# Patient Record
Sex: FEMALE | Race: WHITE | NOT HISPANIC OR LATINO | Employment: OTHER | URBAN - METROPOLITAN AREA
[De-identification: names, ages, dates, MRNs, and addresses within clinical notes are randomized per-mention and may not be internally consistent; named-entity substitution may affect disease eponyms.]

---

## 2017-01-17 ENCOUNTER — ALLSCRIPTS OFFICE VISIT (OUTPATIENT)
Dept: OTHER | Facility: OTHER | Age: 77
End: 2017-01-17

## 2017-02-02 ENCOUNTER — ALLSCRIPTS OFFICE VISIT (OUTPATIENT)
Dept: OTHER | Facility: OTHER | Age: 77
End: 2017-02-02

## 2017-02-06 ENCOUNTER — GENERIC CONVERSION - ENCOUNTER (OUTPATIENT)
Dept: OTHER | Facility: OTHER | Age: 77
End: 2017-02-06

## 2017-02-20 ENCOUNTER — APPOINTMENT (OUTPATIENT)
Dept: LAB | Facility: CLINIC | Age: 77
End: 2017-02-20
Payer: MEDICARE

## 2017-02-20 ENCOUNTER — TRANSCRIBE ORDERS (OUTPATIENT)
Dept: ADMINISTRATIVE | Facility: HOSPITAL | Age: 77
End: 2017-02-20

## 2017-02-20 DIAGNOSIS — I10 UNSPECIFIED ESSENTIAL HYPERTENSION: ICD-10-CM

## 2017-02-20 DIAGNOSIS — E78.00 PURE HYPERCHOLESTEROLEMIA: ICD-10-CM

## 2017-02-20 DIAGNOSIS — I42.8 OTHER PRIMARY CARDIOMYOPATHIES: ICD-10-CM

## 2017-02-20 DIAGNOSIS — I42.8 OTHER PRIMARY CARDIOMYOPATHIES: Primary | ICD-10-CM

## 2017-02-20 LAB
ALBUMIN SERPL BCP-MCNC: 3.8 G/DL (ref 3.5–5)
ALP SERPL-CCNC: 75 U/L (ref 46–116)
ALT SERPL W P-5'-P-CCNC: 30 U/L (ref 12–78)
ANION GAP SERPL CALCULATED.3IONS-SCNC: 7 MMOL/L (ref 4–13)
AST SERPL W P-5'-P-CCNC: 25 U/L (ref 5–45)
BILIRUB DIRECT SERPL-MCNC: 0.11 MG/DL (ref 0–0.2)
BILIRUB SERPL-MCNC: 0.46 MG/DL (ref 0.2–1)
BUN SERPL-MCNC: 28 MG/DL (ref 5–25)
CALCIUM SERPL-MCNC: 9.3 MG/DL (ref 8.3–10.1)
CHLORIDE SERPL-SCNC: 103 MMOL/L (ref 100–108)
CHOLEST SERPL-MCNC: 199 MG/DL (ref 50–200)
CO2 SERPL-SCNC: 30 MMOL/L (ref 21–32)
CREAT SERPL-MCNC: 1.18 MG/DL (ref 0.6–1.3)
GFR SERPL CREATININE-BSD FRML MDRD: 44.5 ML/MIN/1.73SQ M
GLUCOSE SERPL-MCNC: 90 MG/DL (ref 65–140)
HDLC SERPL-MCNC: 53 MG/DL (ref 40–60)
LDLC SERPL CALC-MCNC: 119 MG/DL (ref 0–100)
POTASSIUM SERPL-SCNC: 4.6 MMOL/L (ref 3.5–5.3)
PROT SERPL-MCNC: 8.2 G/DL (ref 6.4–8.2)
SODIUM SERPL-SCNC: 140 MMOL/L (ref 136–145)
TRIGL SERPL-MCNC: 133 MG/DL
TSH SERPL DL<=0.05 MIU/L-ACNC: 1.53 UIU/ML (ref 0.36–3.74)

## 2017-02-20 PROCEDURE — 84443 ASSAY THYROID STIM HORMONE: CPT

## 2017-02-20 PROCEDURE — 80061 LIPID PANEL: CPT

## 2017-02-20 PROCEDURE — 80076 HEPATIC FUNCTION PANEL: CPT

## 2017-02-20 PROCEDURE — 36415 COLL VENOUS BLD VENIPUNCTURE: CPT

## 2017-02-20 PROCEDURE — 80048 BASIC METABOLIC PNL TOTAL CA: CPT

## 2017-03-31 ENCOUNTER — GENERIC CONVERSION - ENCOUNTER (OUTPATIENT)
Dept: OTHER | Facility: OTHER | Age: 77
End: 2017-03-31

## 2017-06-06 ENCOUNTER — ALLSCRIPTS OFFICE VISIT (OUTPATIENT)
Dept: OTHER | Facility: OTHER | Age: 77
End: 2017-06-06

## 2017-06-14 ENCOUNTER — GENERIC CONVERSION - ENCOUNTER (OUTPATIENT)
Dept: OTHER | Facility: OTHER | Age: 77
End: 2017-06-14

## 2017-06-14 ENCOUNTER — ALLSCRIPTS OFFICE VISIT (OUTPATIENT)
Dept: OTHER | Facility: OTHER | Age: 77
End: 2017-06-14

## 2017-07-11 ENCOUNTER — GENERIC CONVERSION - ENCOUNTER (OUTPATIENT)
Dept: OTHER | Facility: OTHER | Age: 77
End: 2017-07-11

## 2017-09-25 ENCOUNTER — GENERIC CONVERSION - ENCOUNTER (OUTPATIENT)
Dept: OTHER | Facility: OTHER | Age: 77
End: 2017-09-25

## 2017-10-02 ENCOUNTER — OFFICE VISIT (OUTPATIENT)
Dept: URGENT CARE | Facility: CLINIC | Age: 77
End: 2017-10-02
Payer: MEDICARE

## 2017-10-02 PROCEDURE — 99213 OFFICE O/P EST LOW 20 MIN: CPT

## 2017-10-02 PROCEDURE — G0463 HOSPITAL OUTPT CLINIC VISIT: HCPCS

## 2017-10-11 NOTE — PROGRESS NOTES
Assessment  1  Seasonal allergies (477 9) (J30 2)   2  Cough (786 2) (R05)    Plan  Cough    · Mucinex 600 MG Oral Tablet Extended Release 12 Hour; TAKE 1 TABLET EVERY 12  HOURS AS NEEDED FOR CONGESTION   · ZyrTEC Allergy 10 MG Oral Capsule; I TAB DAILY    Discussion/Summary  Discussion Summary:   Take zyrtec and mucinex as directed  increase fluids and use tylenol or ibuprofen for fever or pain  Followup with primary or return for any new or worsening symptoms  Medication Side Effects Reviewed: Possible side effects of new medications were reviewed with the patient/guardian today  Counseling Documentation With Imm: The patient was counseled regarding diagnostic results,-instructions for management,-risk factor reductions,-prognosis,-patient and family education,-impressions,-risks and benefits of treatment options,-importance of compliance with treatment  total time of encounter was 15 [id number] minutes was spent counseling  Follow Up Instructions: Follow Up with your Primary Care Provider in 7 days  If your symptoms worsen, go to the nearest [place] [address] Emergency Department  Chief Complaint  1  Cold Symptoms  Chief Complaint Free Text Note Form: The patient reports a runny nose, post nasal drip, watery eyes, productive cough and chest congestion for the past five days  She denies any fevers or pain  History of Present Illness  HPI: pt reports cold symptoms fo rthe past 5 days  reports sputu with occ dry cough  sore throat runny nose denies tob denies seasonal allergires  ayan fever, n/v/d , ear pain  Hospital Based Practices Required Assessment:   Pain Assessment   the patient states they do not have pain  Abuse And Domestic Violence Screen    Yes, the patient is safe at home -The patient states no one is hurting them  Depression And Suicide Screen  No, the patient has not had thoughts of hurting themself  No, the patient has not felt depressed in the past 7 days     Prefered Language is  [place]  Primary Language is  English  Readiness To Learn: Receptive  Barriers To Learning: none  Education Completed: disease/condition,-medications,-further treatment/follow-up,-treatment/procedure,-equipment/supplies-and-   Teaching Method: verbal-and-written   Person Taught: patient   Cold Symptoms: [place] presents with complaints of cold symptoms  Associated symptoms include nasal congestion,-runny nose,-post nasal drainage,-scratchy throat,-sore throat,-dry cough,-productive cough,-fatigue-and-weakness, but-no sneezing,-no facial pressure,-no facial pain,-no headache,-no plugged ear(s),-no ear pain,-no swollen lymph nodes,-no wheezing,-no shortness of breath,-no nausea,-no vomiting,-no diarrhea,-no fever-and-no chills  Active Problems  1  Allergic rhinitis (477 9) (J30 9)   2  Bilateral impacted cerumen (380 4) (H61 23)   3  Cardiomyopathy (425 4) (I42 9)   4  CHF (congestive heart failure) (428 0) (I50 9)   5  Contact dermatitis due to poison ivy (692 6) (L23 7)   6  GERD without esophagitis (530 81) (K21 9)   7  Hypertension (401 9) (I10)   8  ICD (implantable cardioverter-defibrillator) in place (V45 02) (Z95 810)   9  Joint pain, knee (719 46) (M25 569)   10  Osteoporosis (733 00) (M81 0)   11  Postprandial abdominal bloating (787 3) (R14 0)   12  TMJ syndrome (524 69) (M26 629)    Past Medical History  1  Acute bronchitis (466 0) (J20 9)   2  History of Chronic diarrhea of unknown origin (787 91) (K52 9)   3  History of Diabetes mellitus screening (V77 1) (Z13 1)   4  History of Encounter for screening for cardiovascular disorders (V81 2) (Z13 6)   5  History of Encounter for screening mammogram for malignant neoplasm of breast   (V76 12) (Z12 31)   6  History of Flu vaccine need (V04 81) (Z23)   7  History of contact dermatitis (V13 3) (Z87 2)   8  History of esophageal reflux (V12 79) (Z87 19)   9  History of hyperlipidemia (V12 29) (Z86 39)   10   History of impacted cerumen (V12 49) (Z86 69)   11  History of reactive airway disease (V12 69) (Z87 09)   12  History of urticaria (V13 3) (Z87 2)   13  History of weakness (V13 89) (Z87 898)   14  History of Impacted cerumen, unspecified laterality   15  History of Influenza vaccine needed (V04 81) (Z23)   16  History of Multiple Nonvenomous Insect Bites (919 4)   17  History of Muscle weakness (generalized) (728 87) (M62 81)  Active Problems And Past Medical History Reviewed: The active problems and past medical history were reviewed and updated today  Family History  Father    1  Family history of Heart disease   2  No family history of mental disorder  Daughter    3  Family history of Diabetes   4  Family history of Mental disorder  Family History Reviewed: The family history was reviewed and updated today  Social History   · Denies alcohol use causing problems   · Marital History -    · Never A Smoker   · Occupation: Retired  Social History Reviewed: The social history was reviewed and updated today  The social history was reviewed and is unchanged  Surgical History  1  History of Open Treatment Of Weight Bearing Distal Tibial Fracture  Surgical History Reviewed: The surgical history was reviewed and updated today  Current Meds   1  Amiodarone HCl - 200 MG Oral Tablet; TAKE 1 TABLET DAILY; Therapy: (Recorded:[id number]) to Recorded   2  Aspirin 81 MG TABS; Take 1 tablet daily Recorded   3  [employer] Vitamin D CAPS; TAKE 1 CAPSULE Daily Recorded   4  Daily Value Multivitamin TABS; Take 1 tablet daily Recorded   5  Furosemide 40 MG Oral Tablet; Take 1 tablet daily Recorded   6  Losartan Potassium 25 MG Oral Tablet; Take 1 tablet daily Recorded   7  Metoprolol Succinate ER 50 MG Oral Tablet Extended Release 24 Hour; Take 1 tablet   daily Recorded  Medication List Reviewed: The medication list was reviewed and updated today  Allergies  1   No Known Drug Allergies    Vitals  Signs   Recorded: [id number] 11:06AM   Temperature: 97 7 F  Heart Rate: 60  Respiration: 14  Systolic: 739  Diastolic: 72  Height: 5 ft 5 in  Weight: 128 lb   BMI Calculated: 21 3  BSA Calculated: 1 64  O2 Saturation: 98  Pain Scale: 0    Physical Exam    Constitutional   General appearance: No acute distress, well appearing and well nourished  Eyes   Conjunctiva and lids: No swelling, erythema or discharge  Ears, Nose, Mouth, and Throat   External inspection of ears and nose: Normal     Otoscopic examination: Tympanic membranes translucent with normal light reflex  Canals patent without erythema  Nasal mucosa, septum, and turbinates: Normal without edema or erythema  Oropharynx: Normal with no erythema, edema, exudate or lesions  Pulmonary   Respiratory effort: No increased work of breathing or signs of respiratory distress  Auscultation of lungs: Clear to auscultation  Cardiovascular   Auscultation of heart: Normal rate and rhythm, normal S1 and S2, without murmurs  Lymphatic   Palpation of lymph nodes in neck: No lymphadenopathy  Musculoskeletal   Digits and nails: Normal without clubbing or cyanosis  Skin   Skin and subcutaneous tissue: Normal without rashes or lesions      Psychiatric   Orientation to person, place, and time: Normal        Signatures   Electronically signed by : Jada Sharif; Oct  2 2017 12:54PM EST                       (Author)    Electronically signed by : Edel Callejas DO; Oct 10 2017  7:10PM EST                       (Co-author)

## 2017-10-17 ENCOUNTER — APPOINTMENT (OUTPATIENT)
Dept: LAB | Facility: CLINIC | Age: 77
End: 2017-10-17
Payer: MEDICARE

## 2017-10-17 ENCOUNTER — TRANSCRIBE ORDERS (OUTPATIENT)
Dept: ADMINISTRATIVE | Facility: HOSPITAL | Age: 77
End: 2017-10-17

## 2017-10-17 DIAGNOSIS — I50.20 SYSTOLIC HEART FAILURE, UNSPECIFIED HEART FAILURE CHRONICITY: Primary | ICD-10-CM

## 2017-10-17 DIAGNOSIS — I50.20 SYSTOLIC HEART FAILURE, UNSPECIFIED HEART FAILURE CHRONICITY: ICD-10-CM

## 2017-10-17 DIAGNOSIS — I42.0 CONGESTIVE CARDIOMYOPATHY (HCC): ICD-10-CM

## 2017-10-17 LAB
ALBUMIN SERPL BCP-MCNC: 3.8 G/DL (ref 3.5–5)
ALP SERPL-CCNC: 67 U/L (ref 46–116)
ALT SERPL W P-5'-P-CCNC: 30 U/L (ref 12–78)
ANION GAP SERPL CALCULATED.3IONS-SCNC: 5 MMOL/L (ref 4–13)
AST SERPL W P-5'-P-CCNC: 30 U/L (ref 5–45)
BILIRUB DIRECT SERPL-MCNC: 0.1 MG/DL (ref 0–0.2)
BILIRUB SERPL-MCNC: 0.31 MG/DL (ref 0.2–1)
BUN SERPL-MCNC: 18 MG/DL (ref 5–25)
CALCIUM SERPL-MCNC: 8.7 MG/DL (ref 8.3–10.1)
CHLORIDE SERPL-SCNC: 106 MMOL/L (ref 100–108)
CHOLEST SERPL-MCNC: 164 MG/DL (ref 50–200)
CK MB SERPL-MCNC: 2.2 % (ref 0–2.5)
CK MB SERPL-MCNC: 8.8 NG/ML (ref 0–5)
CK SERPL-CCNC: 396 U/L (ref 26–192)
CO2 SERPL-SCNC: 30 MMOL/L (ref 21–32)
CREAT SERPL-MCNC: 1.02 MG/DL (ref 0.6–1.3)
GFR SERPL CREATININE-BSD FRML MDRD: 53 ML/MIN/1.73SQ M
GLUCOSE P FAST SERPL-MCNC: 93 MG/DL (ref 65–99)
HDLC SERPL-MCNC: 50 MG/DL (ref 40–60)
LDLC SERPL CALC-MCNC: 87 MG/DL (ref 0–100)
POTASSIUM SERPL-SCNC: 3.6 MMOL/L (ref 3.5–5.3)
PROT SERPL-MCNC: 7.6 G/DL (ref 6.4–8.2)
SODIUM SERPL-SCNC: 141 MMOL/L (ref 136–145)
TRIGL SERPL-MCNC: 134 MG/DL
TSH SERPL DL<=0.05 MIU/L-ACNC: 1.32 UIU/ML (ref 0.36–3.74)

## 2017-10-17 PROCEDURE — 80061 LIPID PANEL: CPT

## 2017-10-17 PROCEDURE — 82553 CREATINE MB FRACTION: CPT

## 2017-10-17 PROCEDURE — 84443 ASSAY THYROID STIM HORMONE: CPT

## 2017-10-17 PROCEDURE — 82550 ASSAY OF CK (CPK): CPT

## 2017-10-17 PROCEDURE — 36415 COLL VENOUS BLD VENIPUNCTURE: CPT

## 2017-10-17 PROCEDURE — 82248 BILIRUBIN DIRECT: CPT

## 2017-10-17 PROCEDURE — 80053 COMPREHEN METABOLIC PANEL: CPT

## 2017-10-18 ENCOUNTER — APPOINTMENT (OUTPATIENT)
Dept: LAB | Facility: CLINIC | Age: 77
End: 2017-10-18
Payer: MEDICARE

## 2017-10-18 ENCOUNTER — TRANSCRIBE ORDERS (OUTPATIENT)
Dept: ADMINISTRATIVE | Facility: HOSPITAL | Age: 77
End: 2017-10-18

## 2017-10-18 DIAGNOSIS — R79.89 HYPOURICEMIA: ICD-10-CM

## 2017-10-18 DIAGNOSIS — R79.89 HYPOURICEMIA: Primary | ICD-10-CM

## 2017-10-18 LAB — TROPONIN I SERPL-MCNC: <0.02 NG/ML

## 2017-10-18 PROCEDURE — 36415 COLL VENOUS BLD VENIPUNCTURE: CPT

## 2017-10-18 PROCEDURE — 84484 ASSAY OF TROPONIN QUANT: CPT

## 2017-12-15 ENCOUNTER — GENERIC CONVERSION - ENCOUNTER (OUTPATIENT)
Dept: OTHER | Facility: OTHER | Age: 77
End: 2017-12-15

## 2017-12-15 ENCOUNTER — GENERIC CONVERSION - ENCOUNTER (OUTPATIENT)
Dept: FAMILY MEDICINE CLINIC | Facility: CLINIC | Age: 77
End: 2017-12-15

## 2018-01-12 VITALS
BODY MASS INDEX: 20.25 KG/M2 | SYSTOLIC BLOOD PRESSURE: 128 MMHG | HEIGHT: 66 IN | HEART RATE: 60 BPM | DIASTOLIC BLOOD PRESSURE: 82 MMHG | WEIGHT: 126 LBS | TEMPERATURE: 97.8 F

## 2018-01-13 NOTE — PROCEDURES
Chief Complaint  Ear Lavage      Current Meds   1  Amiodarone HCl - 200 MG Oral Tablet; TAKE 1 TABLET DAILY; Therapy: (Recorded:20Voy5254) to Recorded   2  Aspirin 81 MG TABS; Take 1 tablet daily Recorded   3  [employer] Vitamin D CAPS; TAKE 1 CAPSULE Daily Recorded   4  Daily Value Multivitamin TABS; Take 1 tablet daily Recorded   5  Furosemide 40 MG Oral Tablet; Take 1 tablet daily Recorded   6  Losartan Potassium 25 MG Oral Tablet; Take 1 tablet daily Recorded   7  Metoprolol Succinate ER 50 MG Oral Tablet Extended Release 24 Hour; Take 1 tablet   daily Recorded    Allergies    1  No Known Drug Allergies    Vitals  Signs    Heart Rate: 60  Systolic: 086, LUE, Sitting  Diastolic: 60, LUE, Sitting  Height: 5 ft 5 5 in  Weight: 126 lb   BMI Calculated: 20 65  BSA Calculated: 1 63    Procedure    Procedure: cerumen removal    Indication: in both ears  Procedure Note: The procedure was performed by the Provider and lasted 15 minute(s)  A otoscope was placed in the ear canal(s) to visualize the ear canal debris  The ear was cleaned by using warm water irrigation and a curette  The procedure was partially successful  Post-Procedure:   Patient Status: the patient tolerated the procedure well  Complications: there were no complications  Patient instructions: avoid using q-tips  Follow-up as needed  Assessment    1  Bilateral impacted cerumen (380 4) (G53 10)    Discussion/Summary    49-year-old female here for ear lavage and removal of cerumen impaction  Upon completion of procedure tympanic membranes were visualized and hearing noticed to be improved        Signatures   Electronically signed by : ALVARO Amaya ; Feb 2 2017 12:01PM EST                       (Author)

## 2018-01-14 VITALS
WEIGHT: 130 LBS | HEART RATE: 68 BPM | SYSTOLIC BLOOD PRESSURE: 116 MMHG | BODY MASS INDEX: 20.89 KG/M2 | HEIGHT: 66 IN | DIASTOLIC BLOOD PRESSURE: 68 MMHG

## 2018-01-14 VITALS
WEIGHT: 126 LBS | HEART RATE: 60 BPM | DIASTOLIC BLOOD PRESSURE: 60 MMHG | BODY MASS INDEX: 20.25 KG/M2 | SYSTOLIC BLOOD PRESSURE: 100 MMHG | HEIGHT: 66 IN

## 2018-01-14 VITALS
DIASTOLIC BLOOD PRESSURE: 76 MMHG | WEIGHT: 127 LBS | HEIGHT: 66 IN | SYSTOLIC BLOOD PRESSURE: 130 MMHG | HEART RATE: 60 BPM | BODY MASS INDEX: 20.41 KG/M2

## 2018-01-15 NOTE — MISCELLANEOUS
Message   Recorded as Task   Date: 06/14/2017 09:09 AM, Created By: Meaghan Prasad   Task Name: Medical Complaint Callback   Assigned To: Calixto Yeboah   Regarding Patient: Jenise Torres, Status: In Progress   KristiToby Perdomo - 14 Jun 2017 9:09 AM     TASK CREATED  Caller: Self; Medical Complaint; [phone] (Home)  PT C/O POISON IVY ON HAND AND ARMS  CAN YOU RX SOMETHING?  CAN BE REACHED [address] [facility] [phone]   Jasbir Patel - 14 Jun 2017 9:32 AM     TASK REASSIGNED: Previously Assigned To Jasbir Patel  Tell vera to come in this afternoon or this evening   YolisApril - 14 Jun 2017 10:10 AM     TASK EDITED  left detailed messages on cell phone and home number (numbers provided)   YolisApril - 14 Jun 2017 10:10 AM     TASK IN PROGRESS   YolisApril - 14 Jun 2017 10:28 AM     TASK EDITED  patient called back, Juan A Hdez scheduled patient for 1:45pm per ID[facility] program         Active Problems    1  Allergic rhinitis (477 9) (J30 9)   2  Bilateral impacted cerumen (380 4) (H61 23)   3  Cardiomyopathy (425 4) (I42 9)   4  CHF (congestive heart failure) (428 0) (I50 9)   5  GERD without esophagitis (530 81) (K21 9)   6  Hypertension (401 9) (I10)   7  ICD (implantable cardioverter-defibrillator) in place (V45 02) (Z95 810)   8  Joint pain, knee (719 46) (M25 569)   9  Osteoporosis (733 00) (M81 0)   10  Postprandial abdominal bloating (787 3) (R14 0)   11  TMJ syndrome (524 69) (M26 629)    Current Meds   1  Amiodarone HCl - 200 MG Oral Tablet; TAKE 1 TABLET DAILY; Therapy: (Recorded:17Jan2017) to Recorded   2  Aspirin 81 MG TABS; Take 1 tablet daily Recorded   3  CVS Vitamin D CAPS; TAKE 1 CAPSULE Daily Recorded   4  Daily Value Multivitamin TABS; Take 1 tablet daily Recorded   5  Furosemide 40 MG Oral Tablet; Take 1 tablet daily Recorded   6  Losartan Potassium 25 MG Oral Tablet; Take 1 tablet daily Recorded   7  Metoprolol Succinate ER 50 MG Oral Tablet Extended Release 24 Hour;  Take 1 tablet daily Recorded    Allergies    1   No Known Drug Allergies    Signatures   Electronically signed by : Cecil Chavarria, ; Jun 14 2017 10:28AM EST                       (Author)

## 2018-02-03 ENCOUNTER — TRANSCRIBE ORDERS (OUTPATIENT)
Dept: ADMINISTRATIVE | Facility: HOSPITAL | Age: 78
End: 2018-02-03

## 2018-02-03 ENCOUNTER — APPOINTMENT (OUTPATIENT)
Dept: LAB | Facility: CLINIC | Age: 78
End: 2018-02-03
Payer: MEDICARE

## 2018-02-03 DIAGNOSIS — I42.9 PRIMARY CARDIOMYOPATHY (HCC): ICD-10-CM

## 2018-02-03 DIAGNOSIS — I10 ESSENTIAL HYPERTENSION, MALIGNANT: Primary | ICD-10-CM

## 2018-02-03 DIAGNOSIS — I42.9 PRIMARY CARDIOMYOPATHY (HCC): Primary | ICD-10-CM

## 2018-02-03 LAB
CK MB SERPL-MCNC: 2.7 % (ref 0–2.5)
CK MB SERPL-MCNC: 8.8 NG/ML (ref 0–5)
CK SERPL-CCNC: 329 U/L (ref 26–192)

## 2018-02-03 PROCEDURE — 82553 CREATINE MB FRACTION: CPT

## 2018-02-03 PROCEDURE — 82550 ASSAY OF CK (CPK): CPT

## 2018-02-03 PROCEDURE — 36415 COLL VENOUS BLD VENIPUNCTURE: CPT

## 2018-04-13 ENCOUNTER — APPOINTMENT (OUTPATIENT)
Dept: RADIOLOGY | Facility: CLINIC | Age: 78
End: 2018-04-13
Payer: MEDICARE

## 2018-04-13 ENCOUNTER — TRANSCRIBE ORDERS (OUTPATIENT)
Dept: ADMINISTRATIVE | Facility: HOSPITAL | Age: 78
End: 2018-04-13

## 2018-04-13 ENCOUNTER — APPOINTMENT (OUTPATIENT)
Dept: LAB | Facility: CLINIC | Age: 78
End: 2018-04-13
Payer: MEDICARE

## 2018-04-13 DIAGNOSIS — I10 HYPERTENSION, ESSENTIAL: ICD-10-CM

## 2018-04-13 DIAGNOSIS — J84.10 POSTINFLAMMATORY PULMONARY FIBROSIS (HCC): ICD-10-CM

## 2018-04-13 DIAGNOSIS — I42.9 PRIMARY CARDIOMYOPATHY (HCC): ICD-10-CM

## 2018-04-13 DIAGNOSIS — J84.10 POSTINFLAMMATORY PULMONARY FIBROSIS (HCC): Primary | ICD-10-CM

## 2018-04-13 LAB
ANION GAP SERPL CALCULATED.3IONS-SCNC: 5 MMOL/L (ref 4–13)
BUN SERPL-MCNC: 19 MG/DL (ref 5–25)
CALCIUM SERPL-MCNC: 9 MG/DL
CHLORIDE SERPL-SCNC: 106 MMOL/L (ref 100–108)
CHOLEST SERPL-MCNC: 158 MG/DL (ref 50–200)
CO2 SERPL-SCNC: 30 MMOL/L (ref 21–32)
CREAT SERPL-MCNC: 1.14 MG/DL (ref 0.6–1.3)
GFR SERPL CREATININE-BSD FRML MDRD: 46 ML/MIN/1.73SQ M
GLUCOSE P FAST SERPL-MCNC: 95 MG/DL (ref 65–99)
HDLC SERPL-MCNC: 46 MG/DL (ref 40–60)
LDLC SERPL CALC-MCNC: 74 MG/DL (ref 0–100)
POTASSIUM SERPL-SCNC: 4.5 MMOL/L (ref 3.5–5.3)
SODIUM SERPL-SCNC: 141 MMOL/L (ref 136–145)
TRIGL SERPL-MCNC: 191 MG/DL

## 2018-04-13 PROCEDURE — 80061 LIPID PANEL: CPT

## 2018-04-13 PROCEDURE — 80048 BASIC METABOLIC PNL TOTAL CA: CPT

## 2018-04-13 PROCEDURE — 71046 X-RAY EXAM CHEST 2 VIEWS: CPT

## 2018-04-13 PROCEDURE — 36415 COLL VENOUS BLD VENIPUNCTURE: CPT

## 2018-04-30 ENCOUNTER — HOSPITAL ENCOUNTER (EMERGENCY)
Facility: HOSPITAL | Age: 78
Discharge: HOME | End: 2018-04-30
Admitting: EMERGENCY MEDICINE
Payer: MEDICARE

## 2018-04-30 VITALS
DIASTOLIC BLOOD PRESSURE: 65 MMHG | TEMPERATURE: 97.5 F | OXYGEN SATURATION: 100 % | BODY MASS INDEX: 20.48 KG/M2 | RESPIRATION RATE: 14 BRPM | SYSTOLIC BLOOD PRESSURE: 138 MMHG | HEART RATE: 60 BPM | WEIGHT: 125 LBS

## 2018-04-30 DIAGNOSIS — S51.811A SKIN TEAR OF RIGHT FOREARM WITHOUT COMPLICATION, INITIAL ENCOUNTER: Primary | ICD-10-CM

## 2018-04-30 PROCEDURE — 99283 EMERGENCY DEPT VISIT LOW MDM: CPT

## 2018-04-30 PROCEDURE — 90471 IMMUNIZATION ADMIN: CPT

## 2018-04-30 RX ORDER — AMOXICILLIN AND CLAVULANATE POTASSIUM 500; 125 MG/1; MG/1
1 TABLET, FILM COATED ORAL EVERY 12 HOURS SCHEDULED
Qty: 13 TABLET | Refills: 0 | Status: SHIPPED | OUTPATIENT
Start: 2018-04-30 | End: 2018-05-07

## 2018-04-30 RX ORDER — AMOXICILLIN AND CLAVULANATE POTASSIUM 875; 125 MG/1; MG/1
1 TABLET, FILM COATED ORAL ONCE
Status: DISCONTINUED | OUTPATIENT
Start: 2018-04-30 | End: 2018-04-30

## 2018-04-30 RX ORDER — AMOXICILLIN AND CLAVULANATE POTASSIUM 500; 125 MG/1; MG/1
1 TABLET, FILM COATED ORAL ONCE
Status: COMPLETED | OUTPATIENT
Start: 2018-04-30 | End: 2018-04-30

## 2018-04-30 RX ORDER — GINSENG 100 MG
1 CAPSULE ORAL ONCE
Status: DISCONTINUED | OUTPATIENT
Start: 2018-04-30 | End: 2018-05-01 | Stop reason: HOSPADM

## 2018-04-30 RX ADMIN — AMOXICILLIN AND CLAVULANATE POTASSIUM 1 TABLET: 500; 125 TABLET, FILM COATED ORAL at 22:20

## 2018-05-01 NOTE — DISCHARGE INSTRUCTIONS
Watch for increased signs of infection (redness, swelling, pus, streaking redness, fevers)  Keep area clean and dry  Follow up with your family doctor for wound recheck  Laceration Without Closure   WHAT YOU NEED TO KNOW:   Your laceration has gone past the time to be closed  Lacerations in areas of poor blood flow usually need to be closed within 8 hours  In areas with normal blood flow, lacerations usually need to be closed within 12 hours  Facial lacerations need to be closed within 24 hours  Your laceration has been cleaned and a dressing has been applied  Your laceration will heal on its own without sutures, staples, or other closure devices  DISCHARGE INSTRUCTIONS:   Return to the emergency department if:   · You have redness, pain, or fever that gets worse quickly  · Your wound has a bad smell or has pus draining from it  · You have bleeding that does not stop after 10 minutes of holding firm, direct pressure on your wound  Contact your healthcare provider if:  You have questions or concerns about your condition or care  Wound care:   · Keep the wound dry for the first 24 to 48 hours  or as directed  Wash your hands with soap and warm water before and after you care for your wound  After that, gently clean the wound once or twice a day with cool water  Use soap to clean around the wound, but try not to get any on the wound edges  Do not use alcohol or hydrogen peroxide to clean your wound unless you are directed to do so  · Leave your bandage on as long as directed  Bandages keep your wound clean and protected  They can also prevent swelling  Ask how to change and how often to change your bandage  Ask if you should apply antibacterial ointment  Be careful not to wrap the bandage or tape too tightly  This could cut off blood flow and cause more injury  Change your bandages when they get wet or dirty    Follow up with your healthcare provider within 2 days or as directed:  Write down your questions so you remember to ask them during your visits  © 2017 [address] [employer] [employer] Information is for End User's use only and may not be sold, redistributed or otherwise used for commercial purposes  All illustrations and images included in CareNotes® are the copyrighted property of A D A M , Inc  or Juan Daniel Cochran  The above information is an  only  It is not intended as medical advice for individual conditions or treatments  Talk to your doctor, nurse or pharmacist before following any medical regimen to see if it is safe and effective for you

## 2018-05-01 NOTE — ED NOTES
Computer in pt's room not allowing me access; error shows  Rebooted computer and still unable to get in  Got computer on wheels and attempted to pt's name band and medicines, but scanner not working--computer was plugged in but scanner was not properly in place on ; therfore unable to scan meds  Tetanus given:  Lot #[id number]; : [employer]; expiration 2/14/20; given in left deltoid  Bacitracin and telfa dressing applied to arm wound by BALA Conrad, but also unable to scan bacitracin       Renuka Saldaña, MAGNO  04/30/18 [address], RN  04/30/18 3836

## 2018-05-01 NOTE — ED PROVIDER NOTES
History  Chief Complaint   Patient presents with    Arm Injury     Right forearm scratched by her dog  Dog is up to date with shots per pt who is owner of dog      29-year-old female presents the ER with V shaped laceration on R forearm  Patient states that she was Scratched by her dog  Patient has a smaller scratch above her current scratch where her dog scratched her the day before  Dog is up-to-date with his vaccinations and patient states that she is up-to-date with her tetanus shot  None       Past Medical History:   Diagnosis Date    Cardiomyopathy [facility])     Chronic diarrhea of unknown origin     resolved 12/22/2015    Contact dermatitis     last assessed 04/19/2013    Esophageal reflux     resolved 07/01/2016    Hyperlipidemia     resolved 12/22/2015    Muscle weakness (generalized)     resolved 12/22/2015    Pacemaker     Reactive airway disease     resolved 12/22/2015       Past Surgical History:   Procedure Laterality Date    LEG SURGERY Right     OTHER SURGICAL HISTORY      open treatment of weight bearing distal tibial fracture       Family History   Problem Relation Age of Onset    Heart disease Father     Diabetes Daughter     Mental illness Daughter      disorder     I have reviewed and agree with the history as documented  Social History   Substance Use Topics    Smoking status: Never Smoker    Smokeless tobacco: Never Used    Alcohol use No      Comment: denies alcohol use causing problems        Review of Systems   Constitutional: Negative for chills and fever  Skin: Positive for wound  Neurological: Negative for dizziness, syncope, weakness, light-headedness, numbness and headaches  All other systems reviewed and are negative        Physical Exam  ED Triage Vitals [04/30/18 2051]   Temperature Pulse Respirations Blood Pressure SpO2   97 5 °F (36 4 °C) 60 14 151/70 99 %      Temp Source Heart Rate Source Patient Position - Orthostatic VS BP Location FiO2 (%) Tympanic Monitor Sitting Left arm --      Pain Score       5           Orthostatic Vital Signs  Vitals:    04/30/18 2051 04/30/18 2100 04/30/18 2115 04/30/18 2130   BP: 151/70 142/65 138/60 138/65   Pulse: 60 61 60 60   Patient Position - Orthostatic VS: Sitting          Physical Exam   Constitutional: She is oriented to person, place, and time  Vital signs are normal  She appears well-developed and well-nourished  HENT:   Head: Normocephalic and atraumatic  Eyes: Conjunctivae and EOM are normal  Pupils are equal, round, and reactive to light  Neck: Normal range of motion  Neck supple  Cardiovascular: Normal rate, regular rhythm, normal heart sounds and normal pulses  Pulmonary/Chest: Effort normal and breath sounds normal    Musculoskeletal: Normal range of motion  Neurological: She is alert and oriented to person, place, and time  Skin: Skin is warm and dry  Capillary refill takes less than 2 seconds  Laceration (V-shaped skin tear to dorsal right forearm) noted  Area was cleaned, bacitracin placed and area was wrapped  Superficial v-shaped skin tear that could not be sutured   Psychiatric: She has a normal mood and affect  Her speech is normal and behavior is normal  Judgment and thought content normal    Nursing note and vitals reviewed        ED Medications  Medications   amoxicillin-clavulanate (AUGMENTIN) 500-125 mg per tablet 1 tablet (1 tablet Oral Given 4/30/18 2220)       Diagnostic Studies  Results Reviewed     None                 No orders to display              Procedures  Procedures       Phone Contacts  ED Phone Contact    ED Course                               MDM  Number of Diagnoses or Management Options  Skin tear of right forearm without complication, initial encounter: new and does not require workup  Patient Progress  Patient progress: stable    CritCare Time    Disposition  Final diagnoses:   Skin tear of right forearm without complication, initial encounter Time reflects when diagnosis was documented in both MDM as applicable and the Disposition within this note     Time User Action Codes Description Comment    4/30/2018 10:09 PM Taylor Brannon Add [D00 190H] Skin tear of right forearm without complication, initial encounter       ED Disposition     ED Disposition Condition Comment    Discharge  Joanhaven Laughlin discharge to home/self care  Condition at discharge: Stable        Follow-up Information     Follow up With Specialties Details Why Contact Info    [facility], DO Family Medicine Call For Recheck, If symptoms worsen [facility]  [phone]          Discharge Medication List as of 4/30/2018 10:19 PM      START taking these medications    Details   amoxicillin-clavulanate (AUGMENTIN) 500-125 mg per tablet Take 1 tablet by mouth every 12 (twelve) hours for 7 days, Starting Mon 4/30/2018, Until Mon 5/7/2018, Normal           No discharge procedures on file      ED Provider  Electronically Signed by           Louise Smith PA-C  05/04/18 9916

## 2018-05-03 ENCOUNTER — OFFICE VISIT (OUTPATIENT)
Dept: FAMILY MEDICINE CLINIC | Facility: CLINIC | Age: 78
End: 2018-05-03
Payer: MEDICARE

## 2018-05-03 VITALS
DIASTOLIC BLOOD PRESSURE: 72 MMHG | BODY MASS INDEX: 20.33 KG/M2 | SYSTOLIC BLOOD PRESSURE: 120 MMHG | HEART RATE: 61 BPM | WEIGHT: 126.5 LBS | HEIGHT: 66 IN

## 2018-05-03 DIAGNOSIS — S51.802A OPEN WOUND OF LEFT FOREARM, INITIAL ENCOUNTER: Primary | ICD-10-CM

## 2018-05-03 PROBLEM — R05.3 COUGH, PERSISTENT: Status: ACTIVE | Noted: 2017-10-02

## 2018-05-03 PROBLEM — M26.629 TMJ SYNDROME: Status: ACTIVE | Noted: 2017-06-06

## 2018-05-03 PROCEDURE — 99213 OFFICE O/P EST LOW 20 MIN: CPT | Performed by: NURSE PRACTITIONER

## 2018-05-03 RX ORDER — METOPROLOL SUCCINATE 50 MG/1
1 TABLET, EXTENDED RELEASE ORAL DAILY
COMMUNITY

## 2018-05-03 RX ORDER — FUROSEMIDE 40 MG/1
40 TABLET ORAL DAILY
COMMUNITY
Start: 2018-02-13

## 2018-05-03 RX ORDER — AMIODARONE HYDROCHLORIDE 200 MG/1
0.5 TABLET ORAL DAILY
COMMUNITY

## 2018-05-03 RX ORDER — LOSARTAN POTASSIUM 25 MG/1
1 TABLET ORAL DAILY
COMMUNITY

## 2018-05-03 NOTE — PROGRESS NOTES
Assessment/Plan   Diagnoses and all orders for this visit:    Open wound of left forearm, initial encounter  Comments:  Scheduled appointment with wound care on Monday to follow there protocol for caring of the wound and promote healing  Orders:  -     Ambulatory referral to Wound Care; Future    Other orders  -     amiodarone 200 mg tablet; Take 0 5 tablets by mouth daily  -     aspirin 81 MG tablet; Take 1 tablet by mouth daily  -     furosemide (LASIX) 40 mg tablet;   -     losartan (COZAAR) 25 mg tablet; Take 1 tablet by mouth daily  -     metoprolol succinate (TOPROL-XL) 50 mg 24 hr tablet; Take 1 tablet by mouth daily        Chief Complaint   Patient presents with    Follow-up     Follow up [facility] ER 4 days ago, dog scratched her right forearm  Subjective   Patient ID: Elmo Wayne is a 66 y o  female  Vitals:    05/03/18 1026   BP: 120/72   Pulse: 61     Wound Check   She was originally treated 3 to 5 days ago (Chris Arias was evaluated in the eD on 4/30 for a wound, a skin tear that occured when her dog scratched)  Prior ED Treatment: initial treatment included cleaning of the wound, antiibiotic dressings and oral augmentin  Maximum temperature: denies fever or chills  There has been no drainage from the wound  There is no redness present  There is no swelling present  The pain has not changed  The following portions of the patient's history were reviewed and updated as appropriate: allergies, current medications, past family history, past medical history, past surgical history and problem list     Review of Systems   Constitutional: Negative for chills, fatigue and fever  HENT: Negative  Eyes: Negative  Respiratory: Negative  Cardiovascular: Negative  Gastrointestinal: Negative  Endocrine: Negative  Genitourinary: Negative  Musculoskeletal: Positive for myalgias (right forearm discomfort)  Skin: Positive for wound  Allergic/Immunologic: Negative  Neurological: Negative  Hematological: Negative  Psychiatric/Behavioral: Negative  Objective     Physical Exam   Constitutional: She appears well-nourished  No distress  HENT:   Head: Normocephalic  Eyes: Conjunctivae are normal    Neck: Normal range of motion  Neck supple  Cardiovascular: Normal rate and regular rhythm  Pulmonary/Chest: Effort normal and breath sounds normal    Musculoskeletal: She exhibits tenderness (right forearm)  She exhibits no edema  Neurological: She is alert  Signa Fill is asking repetitive questions regarding care  She is oriented to time and place currently    Skin: Skin is warm and dry  No rash noted  No erythema  Psychiatric: She has a normal mood and affect  No Known Allergies   Patient Active Problem List   Diagnosis    Allergic rhinitis    [facility] ([facility] [address] )    [facility]est[facility]) [facility])    Cough, persistent    GERD without esophagitis    Hypertension    Osteoporosis    TMJ syndrome     Current Outpatient Prescriptions on File Prior to Visit   Medication Sig Dispense Refill    amoxicillin-clavulanate (AUGMENTIN) 500-125 mg per tablet Take 1 tablet by mouth every 12 (twelve) hours for 7 days 13 tablet 0     No current facility-administered medications on file prior to visit  Social History     Social History    Marital status:       Spouse name: N/A    Number of children: N/A    Years of education: N/A     Occupational History    retired      Social History Main Topics    Smoking status: Never Smoker    Smokeless tobacco: Never Used    Alcohol use No      Comment: denies alcohol use causing problems    Drug use: No    Sexual activity: Not on file     Other Topics Concern    Not on file     Social History Narrative    No narrative on file

## 2018-05-04 NOTE — PATIENT INSTRUCTIONS
Acute Wound Care   AMBULATORY CARE:   An acute wound  is an injury that causes a break in the skin  An acute wound can happen suddenly, last a short time, and may heal on its own  Common signs and symptoms of an acute wound:   · A cut, tear, or gash in your skin    · Bleeding, swelling, pain, or trouble moving the affected area    · Dirt or foreign objects inside the wound     · Milky, yellow, green, or brown pus in the wound     · Red, tender, or warm area around the pus    · Fever  Seek care immediately if:   · You have pus or a foul odor coming from the wound  · You have sudden trouble breathing or chest pain  · Blood soaks through your bandage  Contact your healthcare provider if:   · You have muscle, joint, or body aches, sweating, or a fever  · You have more swelling, redness, or bleeding in your wound  · Your skin is itchy, swollen, or you have a rash  · You have questions or concerns about your condition or care  Treatment for an acute wound  may include any of the following:  · Cleansing  is done with soap and water to wash away germs and decrease the risk of infection  Sterile water further cleans the wound  The cleaning is done under high pressure with a catheter tip and large syringe  A solution that kills germs may also be used  · Debridement  is done to clean and remove objects, dirt, or dead tissues from the open wound  Healthcare providers may also drain the wound to clean out pus  · Closure of the wound  is done with stitches, staples, skin adhesive, or other treatments  This may be done if the wound is wide or deep  Stitches may be needed if the wound is in an area that moves a lot, such as the hands, feet, and joints  Stitches may help to keep the wound from getting infected  They may also decrease the amount of scarring you have  Some wounds may heal better without stitches    Wound care:   · If your wound was closed with thin strips of medical tape, keep them clean and dry  The strips of medical tape will fall off on their own  Do not pull them off  · Keep the bandage clean and dry  Do not remove the bandage over your wound unless your healthcare provider says it is okay  · Wash your hands before and after you take care of your wound to prevent infection  · Clean the wound as directed  If you cannot reach the wound, have someone help you  · If you have packing, make sure all the gauze used to pack the wound is taken out and replaced as directed  Keep track of how many gauze dressings are placed inside the wound  Follow up with your healthcare provider as directed:  Write down your questions so you remember to ask them during your visits  © 2016 [employer] is for End User's use only and may not be sold, redistributed or otherwise used for commercial purposes  All illustrations and images included in CareNotes® are the copyrighted property of A D A M [employer]  or Juan Daniel Cochran  The above information is an  only  It is not intended as medical advice for individual conditions or treatments  Talk to your doctor, nurse or pharmacist before following any medical regimen to see if it is safe and effective for you

## 2018-05-07 ENCOUNTER — APPOINTMENT (OUTPATIENT)
Dept: WOUND CARE | Facility: HOSPITAL | Age: 78
End: 2018-05-07
Attending: PREVENTIVE MEDICINE
Payer: MEDICARE

## 2018-05-07 PROCEDURE — 99213 OFFICE O/P EST LOW 20 MIN: CPT | Performed by: NURSE PRACTITIONER

## 2018-05-15 ENCOUNTER — APPOINTMENT (OUTPATIENT)
Dept: WOUND CARE | Facility: HOSPITAL | Age: 78
End: 2018-05-15
Attending: PREVENTIVE MEDICINE
Payer: MEDICARE

## 2018-05-15 PROCEDURE — 97597 DBRDMT OPN WND 1ST 20 CM/<: CPT

## 2018-05-25 ENCOUNTER — APPOINTMENT (OUTPATIENT)
Dept: WOUND CARE | Facility: HOSPITAL | Age: 78
End: 2018-05-25
Attending: PREVENTIVE MEDICINE
Payer: MEDICARE

## 2018-05-25 PROCEDURE — 99212 OFFICE O/P EST SF 10 MIN: CPT | Performed by: NURSE PRACTITIONER

## 2018-08-11 DIAGNOSIS — I42.9 CARDIOMYOPATHY, UNSPECIFIED TYPE (HCC): ICD-10-CM

## 2018-08-11 DIAGNOSIS — I10 ESSENTIAL HYPERTENSION: ICD-10-CM

## 2018-08-11 DIAGNOSIS — K21.9 GERD WITHOUT ESOPHAGITIS: Primary | ICD-10-CM

## 2018-08-11 DIAGNOSIS — Z13.1 SCREENING FOR DIABETES MELLITUS: ICD-10-CM

## 2018-09-13 ENCOUNTER — APPOINTMENT (OUTPATIENT)
Dept: LAB | Facility: CLINIC | Age: 78
End: 2018-09-13
Payer: MEDICARE

## 2018-09-13 DIAGNOSIS — I42.9 CARDIOMYOPATHY, UNSPECIFIED TYPE (HCC): ICD-10-CM

## 2018-09-13 DIAGNOSIS — K21.9 GERD WITHOUT ESOPHAGITIS: ICD-10-CM

## 2018-09-13 DIAGNOSIS — I10 ESSENTIAL HYPERTENSION: ICD-10-CM

## 2018-09-13 DIAGNOSIS — Z13.1 SCREENING FOR DIABETES MELLITUS: ICD-10-CM

## 2018-09-13 LAB
ALBUMIN SERPL BCP-MCNC: 3.9 G/DL (ref 3.5–5)
ALP SERPL-CCNC: 68 U/L (ref 46–116)
ALT SERPL W P-5'-P-CCNC: 27 U/L (ref 12–78)
ANION GAP SERPL CALCULATED.3IONS-SCNC: 6 MMOL/L (ref 4–13)
AST SERPL W P-5'-P-CCNC: 19 U/L (ref 5–45)
BASOPHILS # BLD AUTO: 0.1 THOUSANDS/ΜL (ref 0–0.1)
BASOPHILS NFR BLD AUTO: 1 % (ref 0–1)
BILIRUB SERPL-MCNC: 0.38 MG/DL (ref 0.2–1)
BUN SERPL-MCNC: 23 MG/DL (ref 5–25)
CALCIUM SERPL-MCNC: 8.8 MG/DL (ref 8.3–10.1)
CHLORIDE SERPL-SCNC: 104 MMOL/L (ref 100–108)
CHOLEST SERPL-MCNC: 171 MG/DL (ref 50–200)
CO2 SERPL-SCNC: 27 MMOL/L (ref 21–32)
CREAT SERPL-MCNC: 1 MG/DL (ref 0.6–1.3)
EOSINOPHIL # BLD AUTO: 0.09 THOUSAND/ΜL (ref 0–0.61)
EOSINOPHIL NFR BLD AUTO: 1 % (ref 0–6)
ERYTHROCYTE [DISTWIDTH] IN BLOOD BY AUTOMATED COUNT: 13 % (ref 11.6–15.1)
GFR SERPL CREATININE-BSD FRML MDRD: 54 ML/MIN/1.73SQ M
GLUCOSE P FAST SERPL-MCNC: 86 MG/DL (ref 65–99)
HCT VFR BLD AUTO: 40.2 % (ref 34.8–46.1)
HDLC SERPL-MCNC: 56 MG/DL (ref 40–60)
HGB BLD-MCNC: 12.6 G/DL (ref 11.5–15.4)
IMM GRANULOCYTES # BLD AUTO: 0.02 THOUSAND/UL (ref 0–0.2)
IMM GRANULOCYTES NFR BLD AUTO: 0 % (ref 0–2)
LDLC SERPL CALC-MCNC: 97 MG/DL (ref 0–100)
LYMPHOCYTES # BLD AUTO: 2.04 THOUSANDS/ΜL (ref 0.6–4.47)
LYMPHOCYTES NFR BLD AUTO: 24 % (ref 14–44)
MCH RBC QN AUTO: 31.2 PG (ref 26.8–34.3)
MCHC RBC AUTO-ENTMCNC: 31.3 G/DL (ref 31.4–37.4)
MCV RBC AUTO: 100 FL (ref 82–98)
MONOCYTES # BLD AUTO: 0.53 THOUSAND/ΜL (ref 0.17–1.22)
MONOCYTES NFR BLD AUTO: 6 % (ref 4–12)
NEUTROPHILS # BLD AUTO: 5.76 THOUSANDS/ΜL (ref 1.85–7.62)
NEUTS SEG NFR BLD AUTO: 68 % (ref 43–75)
NONHDLC SERPL-MCNC: 115 MG/DL
NRBC BLD AUTO-RTO: 0 /100 WBCS
PLATELET # BLD AUTO: 167 THOUSANDS/UL (ref 149–390)
PMV BLD AUTO: 12.1 FL (ref 8.9–12.7)
POTASSIUM SERPL-SCNC: 4.1 MMOL/L (ref 3.5–5.3)
PROT SERPL-MCNC: 7.3 G/DL (ref 6.4–8.2)
RBC # BLD AUTO: 4.04 MILLION/UL (ref 3.81–5.12)
SODIUM SERPL-SCNC: 137 MMOL/L (ref 136–145)
TRIGL SERPL-MCNC: 92 MG/DL
TSH SERPL DL<=0.05 MIU/L-ACNC: 1.39 UIU/ML (ref 0.36–3.74)
WBC # BLD AUTO: 8.54 THOUSAND/UL (ref 4.31–10.16)

## 2018-09-13 PROCEDURE — 85025 COMPLETE CBC W/AUTO DIFF WBC: CPT

## 2018-09-13 PROCEDURE — 80061 LIPID PANEL: CPT

## 2018-09-13 PROCEDURE — 84443 ASSAY THYROID STIM HORMONE: CPT

## 2018-09-13 PROCEDURE — 36415 COLL VENOUS BLD VENIPUNCTURE: CPT

## 2018-09-13 PROCEDURE — 80053 COMPREHEN METABOLIC PANEL: CPT

## 2018-09-20 ENCOUNTER — OFFICE VISIT (OUTPATIENT)
Dept: FAMILY MEDICINE CLINIC | Facility: CLINIC | Age: 78
End: 2018-09-20
Payer: MEDICARE

## 2018-09-20 VITALS
WEIGHT: 121.6 LBS | RESPIRATION RATE: 14 BRPM | TEMPERATURE: 97.8 F | OXYGEN SATURATION: 98 % | HEIGHT: 66 IN | HEART RATE: 68 BPM | SYSTOLIC BLOOD PRESSURE: 124 MMHG | DIASTOLIC BLOOD PRESSURE: 68 MMHG | BODY MASS INDEX: 19.54 KG/M2

## 2018-09-20 DIAGNOSIS — Z23 ENCOUNTER FOR IMMUNIZATION: ICD-10-CM

## 2018-09-20 DIAGNOSIS — Z12.31 ENCOUNTER FOR SCREENING MAMMOGRAM FOR BREAST CANCER: ICD-10-CM

## 2018-09-20 DIAGNOSIS — Z12.11 SCREENING FOR COLON CANCER: ICD-10-CM

## 2018-09-20 DIAGNOSIS — Z13.820 SCREENING FOR OSTEOPOROSIS: Primary | ICD-10-CM

## 2018-09-20 DIAGNOSIS — Z00.00 MEDICARE ANNUAL WELLNESS VISIT, SUBSEQUENT: ICD-10-CM

## 2018-09-20 PROCEDURE — G0008 ADMIN INFLUENZA VIRUS VAC: HCPCS

## 2018-09-20 PROCEDURE — 90662 IIV NO PRSV INCREASED AG IM: CPT

## 2018-09-20 PROCEDURE — G0439 PPPS, SUBSEQ VISIT: HCPCS | Performed by: FAMILY MEDICINE

## 2018-09-20 RX ORDER — ERGOCALCIFEROL (VITAMIN D2) 10 MCG
1 TABLET ORAL DAILY
COMMUNITY

## 2018-09-20 NOTE — PROGRESS NOTES
Assessment and Plan:  Problem List Items Addressed This Visit     None      Visit Diagnoses     Screening for osteoporosis    -  Primary    Relevant Orders    DXA bone density spine hip and pelvis    Medicare annual wellness visit, subsequent        Encounter for immunization        Relevant Orders    influenza vaccine, [id number], high-dose, PF 0 5 mL, for patients 65 yr+ (FLUZONE HIGH-DOSE) (Completed)    Encounter for screening mammogram for breast cancer        Relevant Orders    Mammo screening bilateral w cad    Screening for colon cancer        Relevant Orders    Cologuard       The patient had a normal exam today in the office  She is stable on her current medication  She will continue to follow up with Cardiology as scheduled  Rinku Buchanan is refusing a colonoscopy, stool testing, or any type of colon cancer screening  She is aware of the risks of not screening including missing a pre cancerous polyp that could lead to the development of colon cancer and complications including death  She is aware of this and is still declining a colonoscopy  After further discussion- she is willing to do the Cologuard  We will fax the order  She will continue with her healthy diet and exercise  She will follow up later in the office to discuss further her bilateral leg discomfort  Health Maintenance Due   Topic Date Due    DTaP,Tdap,and Td Vaccines (1 - Tdap) 04/18/1961     Chief Complaint   Patient presents with   [facility] Wellness Visit     subsequent          Aurea Keith is a 66 y o  female who presents today for a Medicare wellness visit  She has a history of heart failure and sees [facility]- Dr Nay Byers and sees him again in December 2018  She complains of weakness in her complains of weakness in her legs that has been ongoing and she gets short of breath going up stairs  She has had this for 2 years and it is worse at night and it is hard to get up     The patient denies any chest pain, shortness of breath, or palpitations  There is no edema  There are no headaches or visual changes  There is no lightheadedness, dizziness, or fainting spells  She had seen the eye doctor recently- he decreased the dose of the amiodarone          Patient Active Problem List   Diagnosis    Allergic rhinitis    [facility]omyopathy ([facility] )    [facility]est[facility] heart failure) ([facility]    Cough, persistent    GERD without esophagitis    Hypertension    Osteoporosis    TMJ syndrome     Past Medical History:   Diagnosis Date    [facility]omyopathy ([facility] )     Chronic diarrhea of unknown origin     resolved 12/22/2015    Contact dermatitis     last assessed 04/19/2013    Esophageal reflux     resolved 07/01/2016    Hyperlipidemia     resolved 12/22/2015    Muscle weakness (generalized)     resolved 12/22/2015    Pacemaker     Reactive airway disease     resolved 12/22/2015     Past Surgical History:   Procedure Laterality Date    LEG SURGERY Right     OTHER SURGICAL HISTORY      open treatment of weight bearing distal tibial fracture     Family History   Problem Relation Age of Onset    Heart disease Father     Diabetes Daughter     Mental illness Daughter         disorder     History   Smoking Status    Never Smoker   Smokeless Tobacco    Never Used     History   Alcohol Use No     Comment: denies alcohol use causing problems      History   Drug Use No         Current Outpatient Prescriptions   Medication Sig Dispense Refill    amiodarone 200 mg tablet Take 0 5 tablets by mouth daily      aspirin 81 MG tablet Take 1 tablet by mouth daily      Cholecalciferol ([employer] VITAMIN D) 2000 units CAPS Take 1 capsule by mouth daily      furosemide (LASIX) 40 mg tablet       losartan (COZAAR) 25 mg tablet Take 1 tablet by mouth daily      Multiple Vitamin (DAILY VALUE MULTIVITAMIN) TABS Take 1 tablet by mouth daily      metoprolol succinate (TOPROL-XL) 50 mg 24 hr tablet Take 1 tablet by mouth daily       No current facility-administered medications for this visit  No Known Allergies  Immunization History   Administered Date(s) Administered    Influenza Split High Dose Preservative Free IM 10/09/2012, 09/26/2013, 09/29/2014, 10/28/2015, 09/29/2016, 10/17/2017    Influenza, high dose seasonal 0 5 mL 09/20/2018    Pneumococcal Conjugate 13-Valent 12/22/2015    Pneumococcal Polysaccharide PPV23 10/17/2005, 11/10/2011    Tuberculin Skin Test-PPD Intradermal 11/13/2006, 11/20/2006       Patient Care Team:  Isi Allison DO as PCP - General (Family Medicine)  Lynnann Goldberg, DO (Cardiology)  Ciara Campos MD (Orthopedic Surgery)    Medicare Screening Tests and Risk Assessments:  Chris Arias is here for her Subsequent Wellness visit  Last Medicare Wellness visit information reviewed, patient interviewed and updates made to the record today  Health Risk Assessment:  Patient rates overall health as very good  Patient feels that their physical health rating is Same  Eyesight was rated as Slightly worse  Hearing was rated as Same  Patient feels that their emotional and mental health rating is Same  Pain experienced by patient in the last 7 days has been Some  Patient states that she has experienced no weight loss or gain in last 6 months  Emotional/Mental Health:  Patient has been feeling nervous/anxious  PHQ-9 Depression Screening:    Frequency of the following problems over the past two weeks:      1  Little interest or pleasure in doing things: 0 - not at all      2  Feeling down, depressed, or hopeless: 0 - not at all  PHQ-2 Score: 0          Broken Bones/Falls: Fall Risk Assessment:    In the past year, patient has experienced: No history of falling in past year          Bladder/Bowel:  Patient has not leaked urine accidently in the last six months  Patient reports no loss of bowel control  Immunizations:  Patient has had a flu vaccination within the last year  Patient has received a pneumonia shot    Patient has not received a shingles shot  Patient has received tetanus/diphtheria shot  (Additional Comments: She is going to think about the shingles vaccine   )    Home Safety:  Patient has trouble with stairs inside or outside of their home  Patient currently reports that there are no safety hazards present in home, working smoke alarms, working carbon monoxide detectors  (Additional Comments: She gets short of breath with going up the stairs   )    Preventative Screenings:   No breast cancer screening performed, colon cancer screen completed, cholesterol screen completed, glaucoma eye exam completed, (Additional Comments: Last colonoscopy was 4 years a ago  She did get a notice about the repeat colonoscopy, but she ignored it   )    Nutrition:  Current diet: Regular, Low Saturated Fat, No Added Salt and Limited junk food with servings of the following:    Medications:  Patient is currently taking over-the-counter supplements  List of OTC medications includes: Multivitamin  Patient is able to manage medications  Lifestyle Choices:  Patient reports no tobacco use  Patient has not smoked or used tobacco in the past   Patient reports no alcohol use  Patient drives a vehicle  Patient wears seat belt  Current level of exercise of physical activity described by patient as: Exercises 3 times a week at the [facility]- stretching and strength training           Activities of Daily Living:  Can get out of bed by his or her self, able to dress self, able to make own meals, able to do own shopping, able to bathe self, can do own laundry/housekeeping, can manage own money, pay bills and track expenses    Previous Hospitalizations:  No hospitalization or ED visit in past 12 months        Advanced Directives:  Patient has decided on a power of   Patient has spoken to designated power of   Patient has completed advanced directive          Preventative Screening/Counseling:      Cardiovascular: General: Risks and Benefits Discussed and Screening Current          Diabetes:      General: Risks and Benefits Discussed and Screening Current          Colorectal Cancer:      General: Risks and Benefits Discussed and Screening Current          Breast Cancer:      General: Risks and Benefits Discussed and Screening Current          Cervical Cancer:      General: Risks and Benefits Discussed and Screening Current          Osteoporosis:      General: Risks and Benefits Discussed and Screening Current          AAA:      General: Screening Not Indicated          Glaucoma:      General: Risks and Benefits Discussed and Screening Current          HIV:      General: Screening Not Indicated          Hepatitis C:      General: Screening Not Indicated        Advanced Directives:   Patient has living will for healthcare, has durable POA for healthcare, patient has an advanced directive  Information on ACP and/or AD provided  No 5 wishes given  End of life assessment reviewed with patient  Provider agrees with end of life decisions   Immunizations:  Patient reviewed and up to date      Influenza: Risks & Benefits Discussed and Influenza Due Today      Pneumococcal: Risks & Benefits Discussed and Lifetime Vaccine Completed      Shingrix: Risks & Benefits Discussed      Hepatitis B (Medium to high risk patients): Patient Declines      Zostavax: Risks & Benefits Discussed      TDAP: Risks & Benefits Discussed             Visual Acuity Screening    Right eye Left eye Both eyes   Without correction: 20/70 20/200 20/50   With correction:      Comments: She sees the eye doctor  She was seen in April 2018  Physical Exam:  Review of Systems   Constitutional: Negative  HENT: Negative  Eyes: Negative  Respiratory: Negative  Cardiovascular: Negative  Gastrointestinal: Negative  Negative for bowel incontinence  Endocrine: Negative  Genitourinary: Negative  Musculoskeletal: Negative  Skin: Negative  Allergic/Immunologic: Negative  Neurological: Negative  Hematological: Negative  Psychiatric/Behavioral: Negative  The patient is not nervous/anxious  Vitals:    09/20/18 1304   BP: 124/68   BP Location: Right arm   Patient Position: Sitting   Cuff Size: Standard   Pulse: 68   Resp: 14   Temp: 97 8 °F (36 6 °C)   TempSrc: Tympanic   SpO2: 98%   Weight: 55 2 kg (121 lb 9 6 oz)   Height: 5' 6" (1 676 m)   Body mass index is 19 63 kg/m²  Physical Exam   Constitutional: She is oriented to person, place, and time  She appears well-developed and well-nourished  No distress  HENT:   Head: Normocephalic and atraumatic  Right Ear: External ear normal    Left Ear: External ear normal    Nose: Nose normal    Mouth/Throat: Oropharynx is clear and moist  No oropharyngeal exudate  Eyes: EOM are normal  Pupils are equal, round, and reactive to light  Right eye exhibits no discharge  Left eye exhibits no discharge  No scleral icterus  There is increased cerumen present in the right ear canal    Neck: Normal range of motion  Neck supple  No JVD present  No tracheal deviation present  No thyromegaly present  Cardiovascular: Normal rate, regular rhythm, normal heart sounds and intact distal pulses  Exam reveals no gallop and no friction rub  No murmur heard  Pulmonary/Chest: Effort normal and breath sounds normal  No respiratory distress  She has no wheezes  She has no rales  She exhibits no tenderness  Abdominal: Soft  Bowel sounds are normal  She exhibits no distension and no mass  There is no tenderness  There is no rebound and no guarding  No hernia  Musculoskeletal: Normal range of motion  She exhibits no edema, tenderness or deformity  Lymphadenopathy:     She has no cervical adenopathy  Neurological: She is alert and oriented to person, place, and time  She displays normal reflexes  No cranial nerve deficit or sensory deficit  She exhibits normal muscle tone   Coordination normal  Skin: Skin is warm and dry  No rash noted  She is not diaphoretic  No erythema  No pallor  Psychiatric: She has a normal mood and affect   Her behavior is normal  Thought content normal      Appointment on 09/13/2018   Component Date Value    WBC 09/13/2018 8 54     RBC 09/13/2018 4 04     Hemoglobin 09/13/2018 12 6     Hematocrit 09/13/2018 40 2     MCV 09/13/2018 100*    MCH 09/13/2018 31 2     MCHC 09/13/2018 31 3*    RDW 09/13/2018 13 0     MPV 09/13/2018 12 1     Platelets 10/68/6723 167     nRBC 09/13/2018 0     Neutrophils Relative 09/13/2018 68     Immat GRANS % 09/13/2018 0     Lymphocytes Relative 09/13/2018 24     Monocytes Relative 09/13/2018 6     Eosinophils Relative 09/13/2018 1     Basophils Relative 09/13/2018 1     Neutrophils Absolute 09/13/2018 5 76     Immature Grans Absolute 09/13/2018 0 02     Lymphocytes Absolute 09/13/2018 2 04     Monocytes Absolute 09/13/2018 0 53     Eosinophils Absolute 09/13/2018 0 09     Basophils Absolute 09/13/2018 0 10     Sodium 09/13/2018 137     Potassium 09/13/2018 4 1     Chloride 09/13/2018 104     CO2 09/13/2018 27     ANION GAP 09/13/2018 6     BUN 09/13/2018 23     Creatinine 09/13/2018 1 00     Glucose, Fasting 09/13/2018 86     Calcium 09/13/2018 8 8     AST 09/13/2018 19     ALT 09/13/2018 27     Alkaline Phosphatase 09/13/2018 68     Total Protein 09/13/2018 7 3     Albumin 09/13/2018 3 9     Total Bilirubin 09/13/2018 0 38     eGFR 09/13/2018 54     Cholesterol 09/13/2018 171     Triglycerides 09/13/2018 92     HDL, Direct 09/13/2018 56     LDL Calculated 09/13/2018 97     Non-HDL-Chol (CHOL-HDL) 09/13/2018 115     TSH 3RD GENERATON 09/13/2018 1 390

## 2018-09-20 NOTE — PATIENT INSTRUCTIONS
Your blood work was all very good  Continue with your current medications and healthy diet and exercise  Your Annual Wellness Visit (AWV) Report Card     Your medicare annual wellness visit is a great opportunity to review your lifestyle and risk factors for heart disease, as well as being sure you are up to date on your cancer screening tests  Medicare annual wellness visit helps us plan out your health over the next 10 years  Below are numbers and results we commonly monitor as markers of health, along with the goals for each   By maintaining a proper weight, blood pressure, blood sugar and cholesterol level, you can help reduce your risk of having a stroke or heart attack          Component Goals  Results    Weight Check every visit  Wt Readings from Last 3 Encounters:   09/20/18 55 2 kg (121 lb 9 6 oz)   05/03/18 57 4 kg (126 lb 8 oz)   04/30/18 56 7 kg (125 lb)      BMI (Body Mass Index) 18 5-24 9 = Normal   25-29 9= Overweight  30-39 9 = Obese  >40 = Morbidly Obese     If BMI >25 = lose 2-3 pounds per month  Body mass index is 19 63 kg/m²      Blood Pressure Less than 140/90    Check every visit BP Readings from Last 3 Encounters:   09/20/18 124/68   05/03/18 120/72   04/30/18 138/65       Total Cholesterol Less than 200  Less than 150 if you have diabetes of heart disease Lab Results   Component Value Date    CHOL 164 08/25/2014    CHOL 154 09/09/2013      LDL (The "bad" cholesterol) <100 Lab Results   Component Value Date    LDLCALC 97 09/13/2018    LDLCALC 74 04/13/2018    LDLCALC 87 10/17/2017    LDLCALC 83 08/25/2014    LDLCALC 77 09/09/2013      Triglycerides - another cholesterol Less than 150 Lab Results   Component Value Date    TRIG 92 09/13/2018    TRIG 191 (H) 04/13/2018    TRIG 134 10/17/2017    TRIG 69 08/25/2014    TRIG 83 09/09/2013      HDL (The "good" cholesterol) Greater than 50 for women Lab Results   Component Value Date    HDL 56 09/13/2018    HDL 46 04/13/2018    HDL 50 10/17/2017 HDL 67 08/25/2014    HDL 60 09/09/2013      Diabetes Screening  - fasting glucose or hemoglobin A1C Covered every 3 years    OR    Covered every 6 months if has prediabetes  Lab Results   Component Value Date    GLUCOSE 85 01/04/2016    GLUCOSE 119 09/21/2015    GLUCOSE 87 07/07/2015     No results found for: HGBA1C   Depression screening  -PHQ2 >2 or PHQ9 >10 Screen yearly Negative for depression with PHQ2 score of 0  Fall risk  - positive if 2 more falls in last year or 1 fall with injury in last year Screen yearly Negative - no falls in past year   Urinary incontinence screen Screen yearly Negative      Here is a summary of the screening tests which our records indicate are recommended for you based on your age and gender:  These recommendations are based on the guidelines established by the [place] Preventive Task Force (USPSTF), an independent non-governmental group of national experts in prevention and evidence-based medicine  Health Maintenance Due   Topic Date Due    DTaP,Tdap,and Td Vaccines (1 - Tdap) 04[birth year]/1961    INFLUENZA VACCINE  09/01/2018         ADDITIONAL MEDICARE SERVICES:  The following services are recommended for qualifying Medicare Beneficiaries   Recommendations for you are indicated  SERVICE LIMITATIONS RECOMMENDATION   Vaccine    - Hepatitis B If medium/high risk  Not applicable   Smoking Cessation Counseling    - 3-10 minutes    - greater than 10 minutes Up to 8 sessions/year  Not applicable   Abdominal Aortic Aneurysm (AAA)    - screening abdominal aortic ultrasound One time coverage:    - Men 73-68 who smoked >100 cigs in lifetime     - Anyone with family history of AAA    - Anyone rec for screening by the USPSTF  Not applicable  HIV Screening    - annually if increased risk     Must by increased risk as per USPSTF guidelines or pregnant  Pregnant patients may have up to 3 tests during pregnancy         Not applicable         Advanced Directives  <no information>  Complete and up to date       Health Risk Reduction Plan   Work on Current Goals:   Goals     None           Pursue the following lifestyle changes: continue current medications and continue current healthy lifestyle patterns     Attend classes for: none     Next Medicare Annual Wellness Visit: 1 year      If you have any questions, please do not hesitate to contact our office at [phone]

## 2018-10-09 ENCOUNTER — TRANSCRIBE ORDERS (OUTPATIENT)
Dept: ADMINISTRATIVE | Facility: HOSPITAL | Age: 78
End: 2018-10-09

## 2018-10-09 DIAGNOSIS — Z12.39 SCREENING BREAST EXAMINATION: Primary | ICD-10-CM

## 2018-10-09 DIAGNOSIS — M81.0 SENILE OSTEOPOROSIS: ICD-10-CM

## 2018-10-25 ENCOUNTER — OFFICE VISIT (OUTPATIENT)
Dept: FAMILY MEDICINE CLINIC | Facility: CLINIC | Age: 78
End: 2018-10-25
Payer: MEDICARE

## 2018-10-25 VITALS
HEART RATE: 76 BPM | OXYGEN SATURATION: 98 % | HEIGHT: 66 IN | BODY MASS INDEX: 19.44 KG/M2 | TEMPERATURE: 98.1 F | SYSTOLIC BLOOD PRESSURE: 112 MMHG | RESPIRATION RATE: 14 BRPM | DIASTOLIC BLOOD PRESSURE: 72 MMHG | WEIGHT: 121 LBS

## 2018-10-25 DIAGNOSIS — R29.898 BILATERAL LEG WEAKNESS: ICD-10-CM

## 2018-10-25 DIAGNOSIS — H60.501 ACUTE OTITIS EXTERNA OF RIGHT EAR, UNSPECIFIED TYPE: ICD-10-CM

## 2018-10-25 DIAGNOSIS — R26.89 BALANCE PROBLEM: ICD-10-CM

## 2018-10-25 DIAGNOSIS — H61.21 IMPACTED CERUMEN OF RIGHT EAR: Primary | ICD-10-CM

## 2018-10-25 DIAGNOSIS — M17.11 PRIMARY OSTEOARTHRITIS OF RIGHT KNEE: ICD-10-CM

## 2018-10-25 PROBLEM — A04.8 HELICOBACTER PYLORI GASTROINTESTINAL TRACT INFECTION: Status: ACTIVE | Noted: 2018-10-25

## 2018-10-25 PROBLEM — R76.8 POSITIVE AUTOANTIBODY SCREENING FOR CELIAC DISEASE: Status: ACTIVE | Noted: 2018-10-25

## 2018-10-25 PROCEDURE — 69209 REMOVE IMPACTED EAR WAX UNI: CPT | Performed by: FAMILY MEDICINE

## 2018-10-25 PROCEDURE — 99213 OFFICE O/P EST LOW 20 MIN: CPT | Performed by: FAMILY MEDICINE

## 2018-10-25 NOTE — PROGRESS NOTES
Assessment/Plan:  1  Impacted cerumen of the right ear with mild right otitis externa-were unable to completely remove the cerumen in the office  We will treat the patient with ear drops for the next few days and she will also combine that with Debrox ear solution to soften the wax  We will bring her back in the office next week to remove the rest of the cerumen  2  Bilateral leg weakness and primary osteoarthritis of the right knee-I am going to refer the patient to physical therapy for further evaluation and treatment and balance training  There are no abnormal findings on exam   She will follow up with orthopedics as scheduled  Diagnoses and all orders for this visit:    Impacted cerumen of right ear  -     neomycin-polymyxin-hydrocortisone (CORTISPORIN) otic solution; Administer 2 drops to the right ear every 6 (six) hours for 7 days    Bilateral leg weakness  -     Ambulatory referral to Physical Therapy; Future    Primary osteoarthritis of right knee  -     Ambulatory referral to Physical Therapy; Future    Balance problem  -     Ambulatory referral to Physical Therapy; Future    Acute otitis externa of right ear, unspecified type  -     neomycin-polymyxin-hydrocortisone (CORTISPORIN) otic solution; Administer 2 drops to the right ear every 6 (six) hours for 7 days    Other orders  -     Ear cerumen removal       Return in about 1 week (around 11/1/2018) for Recheck- and repeat ear lavage        Subjective:   Chief Complaint   Patient presents with    Cerumen Impaction    Extremity Weakness     leg weakness        Patient ID: Saleem Saleem is a 66 y o  female presents today for for evaluation of cerumen impaction and leg weakness  Lourdes Stuart is a 66 y o  Female who presents for evaluation of right sided cerumen impaction  Her right ear feels blocked  There is no drainage  There is no dizziness and there is no tinnitus  She tried the debrox with no relief      She is having weakness in both legs-the right more than the left  There is or osteoarthritis in her right knee  She is seeing her orthopedic specialist and she gets shots in her knee  There is some numbness down her left leg  She has had this weakness for 6  Months  There is no back pain  She is exercising  She is losing her balance at times  She is seeing Dr Rosalind Marino at [facility]  There is no loss of bowel or bladder function  There is no loss of balance  There is no weakness in her arms  There are no headaches or visual changes  Extremity Weakness    The pain is present in the left upper leg, left lower leg, right knee, right lower leg and right upper leg  This is a chronic problem  The current episode started more than 1 month ago  There has been no history of extremity trauma  The problem occurs constantly  The problem has been unchanged  The quality of the pain is described as aching  Associated symptoms include a limited range of motion  Pertinent negatives include no fever, inability to bear weight, itching, joint locking, joint swelling, numbness, stiffness or tingling       The following portions of the patient's history were reviewed and updated as appropriate: allergies, current medications, past family history, past medical history, past social history, past surgical history and problem list   Patient Active Problem List   Diagnosis    Allergic rhinitis    Cardiomyopathy ([facility] )    CHF (congestive heart failure) ([facility] )    Cough, persistent    GERD without esophagitis    Hypertension    Osteoporosis    TMJ syndrome    Positive 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 disease    Helicobacter pylori gastrointestinal tract infection     Past Medical History:   Diagnosis Date    Cardiomyopathy ([facility] )     Chronic diarrhea of unknown origin     resolved 12/22/2015    Contact dermatitis     last assessed 04/19/2013    Esophageal reflux     resolved 07/01/2016    Hyperlipidemia     resolved 12/22/2015    Muscle weakness (generalized)     resolved 12/22/2015    Pacemaker     Reactive airway disease     resolved 12/22/2015     Past Surgical History:   Procedure Laterality Date    LEG SURGERY Right     OTHER SURGICAL HISTORY      open treatment of weight bearing distal tibial fracture     No Known Allergies  Family History   Problem Relation Age of Onset    Heart disease Father     Diabetes Daughter     Mental illness Daughter         disorder     Social History     Social History    Marital status:      Spouse name: N/A    Number of children: N/A    Years of education: N/A     Occupational History    retired      Social History Main Topics    Smoking status: Never Smoker    Smokeless tobacco: Never Used    Alcohol use No      Comment: denies alcohol use causing problems    Drug use: No    Sexual activity: Not on file     Other Topics Concern    Not on file     Social History Narrative      Current Outpatient Prescriptions on File Prior to Visit   Medication Sig Dispense Refill    amiodarone 200 mg tablet Take 0 5 tablets by mouth daily      aspirin 81 MG tablet Take 1 tablet by mouth daily      Cholecalciferol ([employer] VITAMIN D) 2000 units CAPS Take 1 capsule by mouth daily      furosemide (LASIX) 40 mg tablet       losartan (COZAAR) 25 mg tablet Take 1 tablet by mouth daily      metoprolol succinate (TOPROL-XL) 50 mg 24 hr tablet Take 1 tablet by mouth daily      Multiple Vitamin (DAILY VALUE MULTIVITAMIN) TABS Take 1 tablet by mouth daily       No current facility-administered medications on file prior to visit  Review of Systems   Constitutional: Negative  Negative for fever  HENT: Negative  Eyes: Negative  Respiratory: Negative  Cardiovascular: Negative  Gastrointestinal: Negative  Endocrine: Negative  Genitourinary: Negative  Musculoskeletal: Positive for extremity weakness  Negative for stiffness  Skin: Negative  Negative for itching  Allergic/Immunologic: Negative  Neurological: Negative for tingling and numbness  Hematological: Negative  Psychiatric/Behavioral: Negative  Objective:  Vitals:    10/25/18 1503   BP: 112/72   BP Location: Left arm   Patient Position: Sitting   Cuff Size: Standard   Pulse: 76   Resp: 14   Temp: 98 1 °F (36 7 °C)   TempSrc: Tympanic   SpO2: 98%   Weight: 54 9 kg (121 lb)   Height: 5' 6" (1 676 m)     Body mass index is 19 53 kg/m²  Wt Readings from Last 3 Encounters:   10/25/18 54 9 kg (121 lb)   09/20/18 55 2 kg (121 lb 9 6 oz)   05/03/18 57 4 kg (126 lb 8 oz)     Temp Readings from Last 3 Encounters:   10/25/18 98 1 °F (36 7 °C) (Tympanic)   09/20/18 97 8 °F (36 6 °C) (Tympanic)   04/30/18 97 5 °F (36 4 °C) (Tympanic)     BP Readings from Last 3 Encounters:   10/25/18 112/72   09/20/18 124/68   05/03/18 120/72     Pulse Readings from Last 3 Encounters:   10/25/18 76   09/20/18 68   05/03/18 61        Physical Exam   Constitutional: She is oriented to person, place, and time  She appears well-developed and well-nourished  HENT:   Head: Normocephalic and atraumatic  Right Ear: There is swelling and tenderness  No drainage  No foreign bodies  Tympanic membrane is not injected  No middle ear effusion  Decreased hearing is noted  Left Ear: Hearing, tympanic membrane, external ear and ear canal normal    Mouth/Throat: No oropharyngeal exudate  The there is a large amount of cerumen in the right ear  There is redness and swelling of the external auditory canal    Eyes: Pupils are equal, round, and reactive to light  Conjunctivae and EOM are normal    Neck: Normal range of motion  No JVD present  No tracheal deviation present  No thyromegaly present  Cardiovascular: Normal rate, regular rhythm, normal heart sounds and intact distal pulses  Exam reveals no gallop and no friction rub  No murmur heard  Pulmonary/Chest: Effort normal and breath sounds normal  No stridor   No respiratory distress  She has no wheezes  She has no rales  She exhibits no tenderness  Abdominal: Soft  Bowel sounds are normal  She exhibits no distension and no mass  There is no tenderness  There is no rebound and no guarding  Musculoskeletal: She exhibits no edema, tenderness or deformity  Lymphadenopathy:     She has no cervical adenopathy  Neurological: She is alert and oriented to person, place, and time  She has normal reflexes  No cranial nerve deficit  She exhibits normal muscle tone  Coordination normal    Skin: Skin is warm and dry  Ear cerumen removal  Date/Time: 10/25/2018 3:35 PM  Performed by: Chantell Witt by: Saji Whyte     Patient location:  Clinic  Consent:     Consent obtained:  Verbal    Consent given by:  Patient    Risks discussed:  Bleeding, dizziness, incomplete removal, infection, pain and TM perforation    Alternatives discussed:  No treatment and observation  Universal protocol:     Procedure explained and questions answered to patient or proxy's satisfaction: yes      Patient identity confirmed:  Verbally with patient  Procedure details:     Local anesthetic:  None    Location:  R ear    Procedure type: irrigation      Approach:  External  Post-procedure details:     Complication:  None    Hearing quality:  Diminished    Patient tolerance of procedure:  Procedure terminated at patient's request  Comments: The cerumen could not be removed completely  The patient will use the drops as prescribed and we will re-evaluate in 1 week

## 2018-11-06 ENCOUNTER — HOSPITAL ENCOUNTER (OUTPATIENT)
Dept: MAMMOGRAPHY | Facility: CLINIC | Age: 78
Discharge: HOME | End: 2018-11-06
Payer: MEDICARE

## 2018-11-06 ENCOUNTER — OFFICE VISIT (OUTPATIENT)
Dept: FAMILY MEDICINE CLINIC | Facility: CLINIC | Age: 78
End: 2018-11-06
Payer: MEDICARE

## 2018-11-06 VITALS
HEART RATE: 72 BPM | WEIGHT: 119.8 LBS | BODY MASS INDEX: 19.34 KG/M2 | SYSTOLIC BLOOD PRESSURE: 112 MMHG | TEMPERATURE: 97.3 F | DIASTOLIC BLOOD PRESSURE: 60 MMHG

## 2018-11-06 DIAGNOSIS — H61.21 IMPACTED CERUMEN OF RIGHT EAR: Primary | ICD-10-CM

## 2018-11-06 DIAGNOSIS — Z12.39 SCREENING BREAST EXAMINATION: ICD-10-CM

## 2018-11-06 DIAGNOSIS — Z13.820 SCREENING FOR OSTEOPOROSIS: ICD-10-CM

## 2018-11-06 DIAGNOSIS — M81.0 SENILE OSTEOPOROSIS: ICD-10-CM

## 2018-11-06 PROCEDURE — 99213 OFFICE O/P EST LOW 20 MIN: CPT | Performed by: FAMILY MEDICINE

## 2018-11-06 PROCEDURE — 77067 SCR MAMMO BI INCL CAD: CPT

## 2018-11-06 PROCEDURE — 77080 DXA BONE DENSITY AXIAL: CPT

## 2018-11-06 NOTE — PROGRESS NOTES
Assessment/Plan:  Right cerumen impaction-the ear lavage was performed successfully in the office  The wax was removed in entirety  The patient will follow up as needed  Unfortunately, the patient is moving to [place] [facility] tomorrow and will be leaving the practice  We will forward her records  Diagnoses and all orders for this visit:    Impacted cerumen of right ear       No Follow-up on file  Subjective:   Chief Complaint   Patient presents with    Cerumen Impaction     repeat temo [place]         Patient ID: Prosper Glez is a 66 y o  female presents today for repeat ear lavage for right cerumen impaction  Torito Ames is a 66 y o  female who presents today for follow-up from her last office visit on 10/25/2018 for impacted cerumen of the right ear  At that point, the cerumen could not be removed completely in the office and she was advised to use Debrox solution to soften the wax for several days  She is here today for repeat ear lavage  She is not having pain in her ear  There has been no drainage from the ear  There is no dizziness  There are no fevers          The following portions of the patient's history were reviewed and updated as appropriate: allergies, current medications, past family history, past medical history, past social history, past surgical history and problem list   Patient Active Problem List   Diagnosis    Allergic rhinitis    Cardiomyopathy ([address]    CHF (congestive heart failure) ([address]    Cough, persistent    GERD without esophagitis    Hypertension    Osteoporosis    TMJ syndrome    Positive 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 disease    Helicobacter pylori gastrointestinal tract infection     Past Medical History:   Diagnosis Date    Cardiomyopathy ([address] )     Chronic diarrhea of unknown origin     resolved 12/22/2015    Contact dermatitis     last assessed 04/19/2013    Esophageal reflux     resolved 07/01/2016    Hyperlipidemia     resolved 12/22/2015    Muscle weakness (generalized)     resolved 12/22/2015    Pacemaker     Reactive airway disease     resolved 12/22/2015     Past Surgical History:   Procedure Laterality Date    LEG SURGERY Right     OTHER SURGICAL HISTORY      open treatment of weight bearing distal tibial fracture     No Known Allergies  Family History   Problem Relation Age of Onset    Heart disease Father     Diabetes Daughter     Mental illness Daughter         disorder     Social History     Social History    Marital status:      Spouse name: N/A    Number of children: N/A    Years of education: N/A     Occupational History    retired      Social History Main Topics    Smoking status: Never Smoker    Smokeless tobacco: Never Used    Alcohol use No      Comment: denies alcohol use causing problems    Drug use: No    Sexual activity: Not on file     Other Topics Concern    Not on file     Social History Narrative      Current Outpatient Prescriptions on File Prior to Visit   Medication Sig Dispense Refill    amiodarone 200 mg tablet Take 0 5 tablets by mouth daily      aspirin 81 MG tablet Take 1 tablet by mouth daily      Cholecalciferol ([employer] VITAMIN D) 2000 units CAPS Take 1 capsule by mouth daily      furosemide (LASIX) 40 mg tablet       losartan (COZAAR) 25 mg tablet Take 1 tablet by mouth daily      metoprolol succinate (TOPROL-XL) 50 mg 24 hr tablet Take 1 tablet by mouth daily      Multiple Vitamin (DAILY VALUE MULTIVITAMIN) TABS Take 1 tablet by mouth daily      neomycin-polymyxin-hydrocortisone (CORTISPORIN) otic solution Administer 2 drops to the right ear every 6 (six) hours for 7 days 10 mL 0     No current facility-administered medications on file prior to visit  Review of Systems   Constitutional: Negative  HENT: Positive for hearing loss  Eyes: Negative  Respiratory: Negative  Cardiovascular: Negative  Gastrointestinal: Negative  Endocrine: Negative  Genitourinary: Negative  Musculoskeletal: Negative  Skin: Negative  Allergic/Immunologic: Negative  Neurological: Negative  Hematological: Negative  Psychiatric/Behavioral: Negative  Objective:  Vitals:    11/06/18 1331   BP: 112/60   BP Location: Right arm   Patient Position: Sitting   Cuff Size: Standard   Pulse: 72   Temp: (!) 97 3 °F (36 3 °C)   TempSrc: Tympanic   Weight: 54 3 kg (119 lb 12 8 oz)     Body mass index is 19 34 kg/m²  Wt Readings from Last 3 Encounters:   11/06/18 54 3 kg (119 lb 12 8 oz)   10/25/18 54 9 kg (121 lb)   09/20/18 55 2 kg (121 lb 9 6 oz)     Temp Readings from Last 3 Encounters:   11/06/18 (!) 97 3 °F (36 3 °C) (Tympanic)   10/25/18 98 1 °F (36 7 °C) (Tympanic)   09/20/18 97 8 °F (36 6 °C) (Tympanic)     BP Readings from Last 3 Encounters:   11/06/18 112/60   10/25/18 112/72   09/20/18 124/68     Pulse Readings from Last 3 Encounters:   11/06/18 72   10/25/18 76   09/20/18 68        Physical Exam   Constitutional: She is oriented to person, place, and time  She appears well-developed and well-nourished  HENT:   Head: Normocephalic and atraumatic  Right Ear: A foreign body is present  Left Ear: Hearing, tympanic membrane, external ear and ear canal normal    Mouth/Throat: No oropharyngeal exudate  There is a large amount of cerumen present within the right ear  The TM is not visualized  Eyes: Pupils are equal, round, and reactive to light  Conjunctivae and EOM are normal    Neck: Normal range of motion  No JVD present  No tracheal deviation present  No thyromegaly present  Cardiovascular: Normal rate, regular rhythm, normal heart sounds and intact distal pulses  Exam reveals no gallop and no friction rub  No murmur heard  Pulmonary/Chest: Effort normal and breath sounds normal  No stridor  No respiratory distress  She has no wheezes  She has no rales  She exhibits no tenderness  Abdominal: Soft   Bowel sounds are normal  She exhibits no distension and no mass  There is no tenderness  There is no rebound and no guarding  Musculoskeletal: Normal range of motion  She exhibits no edema, tenderness or deformity  Lymphadenopathy:     She has no cervical adenopathy  Neurological: She is alert and oriented to person, place, and time  She has normal reflexes  No cranial nerve deficit  She exhibits normal muscle tone  Coordination normal    Skin: Skin is warm and dry  Ear cerumen removal  Date/Time: 11/7/2018 7:26 AM  Performed by: Jose Seals by: Joy Heard     Patient location:  Clinic  Consent:     Consent obtained:  Verbal    Consent given by:  Patient    Risks discussed:  Bleeding, dizziness, infection, incomplete removal, pain and TM perforation    Alternatives discussed:  No treatment and observation  Universal protocol:     Procedure explained and questions answered to patient or proxy's satisfaction: yes      Patient identity confirmed:  Verbally with patient  Procedure details:     Local anesthetic:  None    Location:  R ear    Procedure type: irrigation      Approach:  External  Post-procedure details:     Complication:  None    Hearing quality:  Improved    Patient tolerance of procedure: Tolerated well, no immediate complications  Comments: The cerumen was removed completely without complication  The TM was visualized  There was no evidence of infection

## 2018-11-07 PROCEDURE — 69209 REMOVE IMPACTED EAR WAX UNI: CPT | Performed by: FAMILY MEDICINE

## 2022-02-07 ENCOUNTER — TELEPHONE (OUTPATIENT)
Dept: OBGYN CLINIC | Facility: HOSPITAL | Age: 82
End: 2022-02-07

## 2022-02-07 NOTE — TELEPHONE ENCOUNTER
Patient called in stating that she had seen a cardiology doctor that was [phone] and I said I am not familiar with a cardiology doctor in that area  She then asked about [place] and I looked for cardiology doctors in [place]  Patient then wanted to schedule so I transferred her to cardiology

## 2022-04-12 ENCOUNTER — OFFICE VISIT (OUTPATIENT)
Dept: OBGYN CLINIC | Facility: CLINIC | Age: 82
End: 2022-04-12
Payer: MEDICARE

## 2022-04-12 ENCOUNTER — APPOINTMENT (OUTPATIENT)
Dept: RADIOLOGY | Facility: CLINIC | Age: 82
End: 2022-04-12
Payer: MEDICARE

## 2022-04-12 ENCOUNTER — OFFICE VISIT (OUTPATIENT)
Dept: FAMILY MEDICINE CLINIC | Facility: CLINIC | Age: 82
End: 2022-04-12
Payer: MEDICARE

## 2022-04-12 VITALS
BODY MASS INDEX: 21.85 KG/M2 | HEIGHT: 64 IN | HEART RATE: 73 BPM | OXYGEN SATURATION: 100 % | DIASTOLIC BLOOD PRESSURE: 90 MMHG | WEIGHT: 128 LBS | RESPIRATION RATE: 17 BRPM | SYSTOLIC BLOOD PRESSURE: 148 MMHG | TEMPERATURE: 98.1 F

## 2022-04-12 VITALS
BODY MASS INDEX: 20.57 KG/M2 | SYSTOLIC BLOOD PRESSURE: 140 MMHG | HEIGHT: 66 IN | WEIGHT: 128 LBS | DIASTOLIC BLOOD PRESSURE: 76 MMHG

## 2022-04-12 DIAGNOSIS — M25.561 RIGHT KNEE PAIN, UNSPECIFIED CHRONICITY: ICD-10-CM

## 2022-04-12 DIAGNOSIS — H61.22 IMPACTED CERUMEN OF LEFT EAR: ICD-10-CM

## 2022-04-12 DIAGNOSIS — J30.2 SEASONAL ALLERGIC RHINITIS, UNSPECIFIED TRIGGER: Primary | ICD-10-CM

## 2022-04-12 DIAGNOSIS — M17.11 PRIMARY OSTEOARTHRITIS OF RIGHT KNEE: Primary | ICD-10-CM

## 2022-04-12 DIAGNOSIS — H60.502 ACUTE OTITIS EXTERNA OF LEFT EAR, UNSPECIFIED TYPE: ICD-10-CM

## 2022-04-12 PROBLEM — H40.89 OTHER SPECIFIED GLAUCOMA: Status: ACTIVE | Noted: 2022-04-12

## 2022-04-12 PROBLEM — Z95.810 AICD (AUTOMATIC CARDIOVERTER/DEFIBRILLATOR) PRESENT: Status: ACTIVE | Noted: 2022-04-12

## 2022-04-12 PROCEDURE — 73564 X-RAY EXAM KNEE 4 OR MORE: CPT

## 2022-04-12 PROCEDURE — 99204 OFFICE O/P NEW MOD 45 MIN: CPT | Performed by: NURSE PRACTITIONER

## 2022-04-12 PROCEDURE — 99203 OFFICE O/P NEW LOW 30 MIN: CPT | Performed by: ORTHOPAEDIC SURGERY

## 2022-04-12 PROCEDURE — 69210 REMOVE IMPACTED EAR WAX UNI: CPT | Performed by: NURSE PRACTITIONER

## 2022-04-12 PROCEDURE — 20610 DRAIN/INJ JOINT/BURSA W/O US: CPT | Performed by: ORTHOPAEDIC SURGERY

## 2022-04-12 RX ORDER — BUPIVACAINE HYDROCHLORIDE 2.5 MG/ML
4 INJECTION, SOLUTION INFILTRATION; PERINEURAL
Status: COMPLETED | OUTPATIENT
Start: 2022-04-12 | End: 2022-04-12

## 2022-04-12 RX ORDER — LORATADINE 10 MG/1
10 TABLET ORAL DAILY
Qty: 30 TABLET | Refills: 3 | Status: SHIPPED | OUTPATIENT
Start: 2022-04-12

## 2022-04-12 RX ORDER — BRIMONIDINE TARTRATE 2 MG/ML
1 SOLUTION/ DROPS OPHTHALMIC 3 TIMES DAILY
COMMUNITY

## 2022-04-12 RX ORDER — LIDOCAINE HYDROCHLORIDE 10 MG/ML
3 INJECTION, SOLUTION INFILTRATION; PERINEURAL
Status: COMPLETED | OUTPATIENT
Start: 2022-04-12 | End: 2022-04-12

## 2022-04-12 RX ORDER — BETAMETHASONE SODIUM PHOSPHATE AND BETAMETHASONE ACETATE 3; 3 MG/ML; MG/ML
6 INJECTION, SUSPENSION INTRA-ARTICULAR; INTRALESIONAL; INTRAMUSCULAR; SOFT TISSUE
Status: COMPLETED | OUTPATIENT
Start: 2022-04-12 | End: 2022-04-12

## 2022-04-12 RX ORDER — FLUTICASONE PROPIONATE 50 MCG
1 SPRAY, SUSPENSION (ML) NASAL DAILY
Qty: 9.9 ML | Refills: 1 | Status: SHIPPED | OUTPATIENT
Start: 2022-04-12

## 2022-04-12 RX ADMIN — LIDOCAINE HYDROCHLORIDE 3 ML: 10 INJECTION, SOLUTION INFILTRATION; PERINEURAL at 14:39

## 2022-04-12 RX ADMIN — BETAMETHASONE SODIUM PHOSPHATE AND BETAMETHASONE ACETATE 6 MG: 3; 3 INJECTION, SUSPENSION INTRA-ARTICULAR; INTRALESIONAL; INTRAMUSCULAR; SOFT TISSUE at 14:39

## 2022-04-12 RX ADMIN — BUPIVACAINE HYDROCHLORIDE 4 ML: 2.5 INJECTION, SOLUTION INFILTRATION; PERINEURAL at 14:39

## 2022-04-12 NOTE — PATIENT INSTRUCTIONS
Use Corticosporin ear drops as prescribed  Follow up in 2 months for Medicare Wellness Exam        Cerumen Impaction   WHAT YOU NEED TO KNOW:   Cerumen impaction is the blockage of the outer ear canal by tightly packed cerumen (earwax)  It is generally treated with procedures such as flushing or suctioning the ear canal or the use of instruments to remove the impaction  DISCHARGE INSTRUCTIONS:   Medicines:  · Ear drops: These are used to soften the wax in your ear  Wax softening ear drops may be bought without a prescription  Ask your healthcare provider how often you should use this medicine  Read the instructions carefully before you use the ear drops  Do the following when you put in ear drops:     ? Warm the drops by holding the bottle in your hands for a few minutes  Cold ear drops may make you dizzy  ? Lie down with the affected ear toward the ceiling  You may also stand with your head tilted to one side  ? Pull your ear lobe up and back, and place the correct number of drops into the ear  ? Keep your ear facing up for 5 to 10 minutes so the drops coat the outer ear canal      ? Gently clean the outer part of the ear with a cotton swab  Do not  place the cotton swab or anything inside your ear canal  This increases the risk of damaging your eardrum  · Take your medicine as directed  Contact your healthcare provider if you think your medicine is not helping or if you have side effects  Tell him of her if you are allergic to any medicine  Keep a list of the medicines, vitamins, and herbs you take  Include the amounts, and when and why you take them  Bring the list or the pill bottles to follow-up visits  Carry your medicine list with you in case of an emergency  Follow up with your healthcare provider as directed:  Write down your questions so you remember to ask them during your visits  Contact your healthcare provider if:   · You have a fever       · You have trouble hearing or ringing in your ear  · You have questions about your condition or care  Return to the emergency department if:   · You feel dizzy  · You have discharge or blood coming out of your ear  · Your ear pain does not go away or gets worse  © Copyright [address] 2018 Information is for End User's use only and may not be sold, redistributed or otherwise used for commercial purposes  All illustrations and images included in CareNotes® are the copyrighted property of A D A M , Inc  or [facility] Timmy Hussein   The above information is an  only  It is not intended as medical advice for individual conditions or treatments  Talk to your doctor, nurse or pharmacist before following any medical regimen to see if it is safe and effective for you

## 2022-04-12 NOTE — ASSESSMENT & PLAN NOTE
Findings today are consistent with severe right knee osteroarthritis  Imaging and prognosis was reviewed with the patient today  Cortisone steroid injection was administered today  Patient should avoid strenuous activities for the next 1-2 days  Patient should avoid vaccines for the next 2 weeks if possible  If patient feels relief with the cortisone injections, procedure can be repeated every 3-4 months  If patient sees minimal/no relief with cortisone injection, treatment with VISCO injections can be further discussed  Discussed with patient that VISCO injections could be beneficial and patient should call the office prior to her next appointment to order injections prior to coming in  Patient can take over-the-counter NSAID as needed as well as use Aspercreme or Voltaren gel  I also discussed with patient surgical treatment with knee replacement if the conservative treatment does not provide her with pain relief  Patient can follow up as needed for right knee  Also advised she can make an appointment for her left knee to be evaluated as well  All patient's questions were answered to her satisfaction  This note is created using dictation transcription  It may contain typographical errors, grammatical errors, improperly dictated words, background noise and other errors

## 2022-04-12 NOTE — PROGRESS NOTES
Assessment/Plan:    No problem-specific Assessment & Plan notes found for this encounter  Problem List Items Addressed This Visit        Respiratory    Allergic rhinitis - Primary    Relevant Medications    fluticasone (FLONASE) 50 mcg/act nasal spray    loratadine (CLARITIN) 10 mg tablet       Nervous and Auditory    Acute otitis externa of left ear    Relevant Medications    neomycin-polymyxin-hydrocortisone (CORTISPORIN) otic solution    Impacted cerumen of left ear            Subjective:      Patient ID: Annabelle Patel is a 80 y o  female  In addition to ear fullness in left ear patient is also having frequent clear runny nose and chronic post nasal drip which causes her to cough  Patient reports she has these symptoms all year round but can be worse at certain times  Patient has a history of allergic rhinitis but is not currently taking anything  Discussed starting Claritin once daily and Flonas nasal spray to help with symptoms  Ear Fullness   There is pain in the left ear  This is a new problem  The current episode started 1 to 4 weeks ago  The problem occurs every few hours  The problem has been waxing and waning  There has been no fever  The pain is at a severity of 0/10  The patient is experiencing no pain  Associated symptoms include hearing loss (sometimes can't ear as well in left ear ) and rhinorrhea (allergic rhinitis )  Pertinent negatives include no abdominal pain, coughing, diarrhea, ear discharge, headaches, neck pain, rash, sore throat or vomiting  Associated symptoms comments: Itching in left ear   She has tried nothing for the symptoms  There is no history of a chronic ear infection, hearing loss or a tympanostomy tube         The following portions of the patient's history were reviewed and updated as appropriate: allergies, current medications, past family history, past medical history, past social history, past surgical history and problem list Ear cerumen removal    Date/Time: 4/12/2022 11:00 AM  Performed by: CARA Huntley  Authorized by: CARA Huntley   Universal Protocol:  Consent: Verbal consent obtained  Written consent not obtained  Risks and benefits: risks, benefits and alternatives were discussed  Consent given by: patient  Time out: Immediately prior to procedure a "time out" was called to verify the correct patient, procedure, equipment, support staff and site/side marked as required  Timeout called at: 4/12/2022 11:00 AM   Patient understanding: patient states understanding of the procedure being performed  Patient identity confirmed: verbally with patient      Patient location:  Clinic  Procedure details:     Local anesthetic:  None    Location:  L ear and R ear    Procedure type: irrigation with instrumentation      Instrumentation: curette and loop      Approach:  Natural orifice  Post-procedure details:     Complication:  Macerated skin    Hearing quality:  Improved    Patient tolerance of procedure: Tolerated well, no immediate complications  Comments:      Able to remove some of ear cerumen from impacted left ear  Able to remove some of ear cerumen from right ear  Patient reported hearing improved in left ear which was the ear with decreased hearing  Small amount of external ear canal erythema in left ear seen after procedure so Cortisporin ear drops prescribed       Review of Systems   Constitutional: Negative  Negative for chills, diaphoresis, fatigue and fever  HENT: Positive for hearing loss (sometimes can't ear as well in left ear ), postnasal drip and rhinorrhea (allergic rhinitis )  Negative for congestion, ear discharge, ear pain, sinus pressure, sinus pain, sneezing, sore throat, tinnitus, trouble swallowing and voice change  Eyes: Negative  Negative for pain and discharge  Respiratory: Positive for shortness of breath (occasionally sob at baseline- worse with wearing a mask )   Negative for cough, chest tightness and wheezing  Cardiovascular: Negative  Negative for chest pain, palpitations and leg swelling  Gastrointestinal: Negative for abdominal pain, constipation, diarrhea, nausea and vomiting  Endocrine: Negative  Negative for cold intolerance, heat intolerance, polydipsia and polyuria  Genitourinary: Negative  Negative for difficulty urinating and dysuria  Musculoskeletal: Positive for arthralgias (B/L knee pain; gets knee injections as needed for OA ), joint swelling (occasionally in B/L knees ) and neck stiffness (pt attributes to how she sleeps on her pillow )  Negative for back pain, myalgias and neck pain  Gait problem: ambulates with cane- reports balance issues  Skin: Negative  Negative for rash  Allergic/Immunologic: Positive for environmental allergies  Neurological: Negative  Negative for dizziness, weakness, light-headedness, numbness and headaches  Hematological: Bruises/bleeds easily (takes taily Aspirin 81 mg )  Psychiatric/Behavioral: Negative  The patient is not nervous/anxious  Objective:      /90 (BP Location: Left arm, Patient Position: Sitting, Cuff Size: Standard)   Pulse 73   Temp 98 1 °F (36 7 °C) (Tympanic)   Resp 17   Ht 5' 4" (1 626 m)   Wt 58 1 kg (128 lb)   LMP  (LMP Unknown)   SpO2 100%   Breastfeeding No   BMI 21 97 kg/m²          Physical Exam  Constitutional:       General: She is not in acute distress  Appearance: Normal appearance  She is not ill-appearing  HENT:      Head: Normocephalic and atraumatic  Right Ear: Tympanic membrane normal  No middle ear effusion  There is no impacted cerumen  Tympanic membrane is not perforated or erythematous  Left Ear: Tympanic membrane normal   No middle ear effusion  There is no impacted cerumen  Tympanic membrane is not perforated or erythematous  Ears:      Comments: Ear cerumen still in B/L ear canals, not impacted   Small amount of erythema to external ear canal of left ear post flushing  Nose: Rhinorrhea (clear ) present  No congestion  Mouth/Throat:      Mouth: Mucous membranes are moist       Pharynx: Oropharynx is clear  No oropharyngeal exudate or posterior oropharyngeal erythema  Eyes:      Extraocular Movements: Extraocular movements intact  Conjunctiva/sclera: Conjunctivae normal    Neck:      Vascular: No carotid bruit  Cardiovascular:      Rate and Rhythm: Normal rate and regular rhythm  Pulses: Normal pulses  Heart sounds: Murmur heard  Pulmonary:      Effort: Pulmonary effort is normal  No respiratory distress  Breath sounds: Normal breath sounds  No wheezing  Abdominal:      General: Bowel sounds are normal       Palpations: Abdomen is soft  Tenderness: There is no abdominal tenderness  Musculoskeletal:         General: Normal range of motion  Cervical back: Normal range of motion and neck supple  Right lower leg: No edema  Left lower leg: No edema  Lymphadenopathy:      Cervical: No cervical adenopathy  Skin:     General: Skin is warm and dry  Capillary Refill: Capillary refill takes less than 2 seconds  Neurological:      General: No focal deficit present  Mental Status: She is alert and oriented to person, place, and time  Psychiatric:         Mood and Affect: Mood normal          Behavior: Behavior normal          Thought Content:  Thought content normal          Judgment: Judgment normal

## 2022-04-12 NOTE — PROGRESS NOTES
Assessment:     1  Primary osteoarthritis of right knee        Plan:     Problem List Items Addressed This Visit        Musculoskeletal and Integument    Primary osteoarthritis of right knee - Primary     Findings today are consistent with severe right knee osteroarthritis  Imaging and prognosis was reviewed with the patient today  Cortisone steroid injection was administered today  Patient should avoid strenuous activities for the next 1-2 days  Patient should avoid vaccines for the next 2 weeks if possible  If patient feels relief with the cortisone injections, procedure can be repeated every 3-4 months  If patient sees minimal/no relief with cortisone injection, treatment with VISCO injections can be further discussed  Discussed with patient that VISCO injections could be beneficial and patient should call the office prior to her next appointment to order injections prior to coming in  Patient can take over-the-counter NSAID as needed as well as use Aspercreme or Voltaren gel  I also discussed with patient surgical treatment with knee replacement if the conservative treatment does not provide her with pain relief  Patient can follow up as needed for right knee  Also advised she can make an appointment for her left knee to be evaluated as well  All patient's questions were answered to her satisfaction  This note is created using dictation transcription  It may contain typographical errors, grammatical errors, improperly dictated words, background noise and other errors  Relevant Medications    bupivacaine (MARCAINE) 0 25 % injection 4 mL (Completed)    lidocaine (XYLOCAINE) 1 % injection 3 mL (Completed)    betamethasone acetate-betamethasone sodium phosphate (CELESTONE) injection 6 mg (Completed)    Other Relevant Orders    XR knee 4+ vw right injury    Large joint arthrocentesis: R knee (Completed)         Subjective:     Patient ID: Zelda Neumann is a 80 y o  female    Chief Complaint:  80year old patient presents today with right knee pain  Pain has been present for many years  Patient notes her pain is located anterior knee specifically bilateral joint line and the pain is described as constant throbbing/achiness   She also notes she has weakness in right quadriceps  She denies locking or giving way symptoms  Patient has increased pain with weight bearing activities, going up stairs and getting in and out of the car  Patient has previously done physical therapy for both knees, and gets cortisone steroid injections every 3-4 months  Patient takes tylenol as needed for pain  Patient would like a CSI today  Information on patient's intake form was reviewed  Allergy:  No Known Allergies  Medications:  all current active meds have been reviewed  Past Medical History:  Past Medical History:   Diagnosis Date    [facility]omyopathy [facility] [address] )     Chronic diarrhea of unknown origin     resolved 12/22/2015    Contact dermatitis     last assessed 04/19/2013    Esophageal reflux     resolved 07/01/2016    Hyperlipidemia     resolved 12/22/2015    Muscle weakness (generalized)     resolved 12/22/2015    Pacemaker     Reactive airway disease     resolved 12/22/2015     Past Surgical History:  Past Surgical History:   Procedure Laterality Date    LEG SURGERY Right     OTHER SURGICAL HISTORY      open treatment of weight bearing distal tibial fracture     Family History:  Family History   Problem Relation Age of Onset    Heart disease Father     Diabetes Daughter     Mental illness Daughter         disorder     Social History:  Social History     Substance and Sexual Activity   Alcohol Use No    Comment: denies alcohol use causing problems     Social History     Substance and Sexual Activity   Drug Use No     Social History     Tobacco Use   Smoking Status Never Smoker   Smokeless Tobacco Never Used     Review of Systems   Constitutional: Negative for chills and fever     HENT: Negative for ear pain and sore throat  Eyes: Negative for pain and visual disturbance  Respiratory: Negative for cough and shortness of breath  Cardiovascular: Negative for chest pain and palpitations  Gastrointestinal: Negative for abdominal pain and vomiting  Genitourinary: Negative for dysuria and hematuria  Musculoskeletal: Positive for arthralgias (right knee) and gait problem (ambulates with cane)  Negative for back pain  Skin: Negative for color change and rash  Neurological: Negative for seizures and syncope  All other systems reviewed and are negative  Objective:  BP Readings from Last 1 Encounters:   04/12/22 140/76      Wt Readings from Last 1 Encounters:   04/12/22 58 1 kg (128 lb)      BMI:   Estimated body mass index is 20 66 kg/m² as calculated from the following:    Height as of this encounter: 5' 6" (1 676 m)  Weight as of this encounter: 58 1 kg (128 lb)  BSA:   Estimated body surface area is 1 66 meters squared as calculated from the following:    Height as of this encounter: 5' 6" (1 676 m)  Weight as of this encounter: 58 1 kg (128 lb)  Physical Exam  Vitals and nursing note reviewed  Constitutional:       Appearance: Normal appearance  She is well-developed  HENT:      Head: Normocephalic and atraumatic  Right Ear: External ear normal       Left Ear: External ear normal    Eyes:      Extraocular Movements: Extraocular movements intact  Conjunctiva/sclera: Conjunctivae normal    Pulmonary:      Effort: Pulmonary effort is normal    Musculoskeletal:         General: Tenderness (knee joint line) present  Cervical back: Neck supple  Right knee: No effusion  Instability Tests: Medial Fanny test negative and lateral Fanny test negative  Skin:     General: Skin is warm and dry  Neurological:      Mental Status: She is alert and oriented to person, place, and time  Deep Tendon Reflexes: Reflexes are normal and symmetric     Psychiatric:         Mood and Affect: Mood normal          Behavior: Behavior normal        Right Knee Exam     Tenderness   The patient is experiencing tenderness in the lateral joint line and medial joint line  Range of Motion   Extension: abnormal   Flexion: abnormal     Tests   Fanny:  Medial - negative Lateral - negative  Varus: negative Valgus: negative  Patellar apprehension: negative    Other   Erythema: absent  Scars: absent  Sensation: normal  Pulse: present  Swelling: none  Effusion: no effusion present    Comments:  Patellofemoral joint crepitation with knee motion  Varus alignment            I have personally reviewed pertinent films in PACS and my interpretation is xray of right knee demonstrates advanced degenerative changes with medial joint narrowing  Chondrocalcinosis  Marginal osteophyte  Varus alignment  Large joint arthrocentesis: R knee  Universal Protocol:  Consent: Verbal consent obtained    Risks and benefits: risks, benefits and alternatives were discussed  Consent given by: patient  Patient understanding: patient states understanding of the procedure being performed  Site marked: the operative site was marked  Patient identity confirmed: verbally with patient    Supporting Documentation  Indications: pain   Procedure Details  Location: knee - R knee  Preparation: Patient was prepped and draped in the usual sterile fashion  Needle size: 22 G  Ultrasound guidance: no  Approach: anterolateral  Medications administered: 4 mL bupivacaine 0 25 %; 3 mL lidocaine 1 %; 6 mg betamethasone acetate-betamethasone sodium phosphate 6 (3-3) mg/mL    Patient tolerance: patient tolerated the procedure well with no immediate complications  Dressing:  Sterile dressing applied        Scribe Attestation    I,:  Anna Vera am acting as a scribe while in the presence of the attending physician :       I,:  Se Smith MD personally performed the services described in this documentation    as scribed in my presence :

## 2022-04-13 ENCOUNTER — TELEPHONE (OUTPATIENT)
Dept: FAMILY MEDICINE CLINIC | Facility: CLINIC | Age: 82
End: 2022-04-13

## 2022-04-13 NOTE — TELEPHONE ENCOUNTER
Patient called  Patient wants to know if Claritin interacts with any of the other mediations she is taking      Please advise    Thank you

## 2022-05-05 ENCOUNTER — TELEPHONE (OUTPATIENT)
Dept: OBGYN CLINIC | Facility: HOSPITAL | Age: 82
End: 2022-05-05

## 2022-05-05 NOTE — TELEPHONE ENCOUNTER
Should not interfere with the cortisone injection  She should check with PCP regarding the other medication she is taking

## 2022-05-05 NOTE — TELEPHONE ENCOUNTER
Patient sees Dr Eric Duque      Patient is calling because her friend recommended her to use Tumeric that comes in a teabag form to help with pain and wanted to check with Dr Eric Duque if that would interfere with anything shes taking        CB: [phone]

## 2022-05-15 NOTE — PROGRESS NOTES
Cardiology Consultation     Cathy Desir  [id number]  1940  [facility]  [address] [facility] [address]  [address] [facility] [id number] [phone]  1  Cardiomyopathy, unspecified type ([facility] [address] )  POCT ECG    TSH, 3rd generation    Hepatic function panel    TSH, 3rd generation with Free T4 reflex   2  ICD (implantable cardioverter-defibrillator) in place  TSH, 3rd generation    Hepatic function panel    TSH, 3rd generation with Free T4 reflex   3  Mitral valve disease  TSH, 3rd generation    Hepatic function panel    TSH, 3rd generation with Free T4 reflex   4  Hypothyroidism, unspecified type   TSH, 3rd generation   5  Cardiomegaly   TSH, 3rd generation with Free T4 reflex     Patient Active Problem List   Diagnosis    Allergic rhinitis    Cardiomyopathy ([facility] [address] )    CH[facility]estive [facility] failure) ([facility])    Cough, persistent    GERD without esophagitis    Hypertension    Osteoporosis    TMJ syndrome    Positive 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 disease    Helicobacter pylori gastrointestinal tract infection    AICD (automatic cardioverter/defibrillator) present    Other specified glaucoma    Acute otitis externa of left ear    Impacted cerumen of left ear    Primary osteoarthritis of right knee       HPI patient here for cardiology evaluation and to establish relationship  Reloc[place] to area from [place] previously under the care of Dr Leanne Haro  She is originally from [place]  She has also had care from the [facility], [facility]iology group  Patient with history of nonischemic cardiomyopathy with echocardiogram December 2019 demonstrating LVEF of 20%  Cardiac catheterization 2015 demonstrated normal coronary anatomy  Cardiac MRI done August 2015 demonstrated a dilated left ventricle with a severe reduction in overall LV systolic function  There was no evidence of old Myocardial infarction, fibrosis or inflammation  Patient has [address] in place placed October 2015 in [place]  She has osteoarthritis, GERD and thrombocytopenia  Patient is on amiodarone in reference to history of PVCs  Dose was reduced at some point because of concern in reference to toxicity  EKG today demonstrates sinus rhythm with appropriate V pacing  She has had no chest pain or significant dyspnea  She appears well compensated  She has no evidence of leg edema  Additional records made available after visit included an echocardiogram done March 4, 2022  This demonstrated a mildly enlarged LV with LVEF of 30-35%  The RV was mild-to-moderately enlarged with reduced RV systolic function and estimated RV systolic pressure of 53 mmHg  There was moderate TR and otherwise no significant valvular heart disease  PMH-  Past Medical History:   Diagnosis Date    Cardiomyopathy [facility])     Chronic diarrhea of unknown origin     resolved 12/22/2015    Contact dermatitis     last assessed 04/19/2013    Esophageal reflux     resolved 07/01/2016    Hyperlipidemia     resolved 12/22/2015    Muscle weakness (generalized)     resolved 12/22/2015    Pacemaker     Reactive airway disease     resolved 12/22/2015        SOCIAL HISTORY-  Social History     Socioeconomic History    Marital status:      Spouse name: Not on file    Number of children: Not on file    Years of education: Not on file    Highest education level: Not on file   Occupational History    Occupation: retired   Tobacco Use    Smoking status: Never Smoker    Smokeless tobacco: Never Used   Vaping Use    Vaping Use: Never used   Substance and Sexual Activity    Alcohol use: No     Comment: denies alcohol use causing problems    Drug use: No    Sexual activity: Not on file   Other Topics Concern    Not on file   Social History Narrative             Social Determinants of Health     Financial Resource Strain: Not on file   Food Insecurity: Not on file Transportation Needs: Not on file   Physical Activity: Not on file   Stress: Not on file   Social Connections: Not on file   Intimate Partner Violence: Not on file   Housing Stability: Not on file        FAMILY HISTORY-  Family History   Problem Relation Age of Onset    Heart disease Father     Diabetes Daughter     Mental illness Daughter         disorder       SURGICAL HISTORY-  Past Surgical History:   Procedure Laterality Date    LEG SURGERY Right     OTHER SURGICAL HISTORY      open treatment of weight bearing distal tibial fracture         Current Outpatient Medications:     amiodarone 200 mg tablet, Take 0 5 tablets by mouth daily, Disp: , Rfl:     aspirin 81 MG tablet, Take 1 tablet by mouth daily, Disp: , Rfl:     brimonidine tartrate 0 2 % ophthalmic solution, 1 drop 3 (three) times a day, Disp: , Rfl:     Cholecalciferol 50 MCG (2000 UT) CAPS, Take 1 capsule by mouth daily, Disp: , Rfl:     fluticasone (FLONASE) 50 mcg/act nasal spray, 1 spray into each nostril daily, Disp: 9 9 mL, Rfl: 1    furosemide (LASIX) 40 mg tablet, Take 40 mg by mouth daily  , Disp: , Rfl:     loratadine (CLARITIN) 10 mg tablet, Take 1 tablet (10 mg total) by mouth daily, Disp: 30 tablet, Rfl: 3    losartan (COZAAR) 25 mg tablet, Take 1 tablet by mouth daily, Disp: , Rfl:     metoprolol succinate (TOPROL-XL) 50 mg 24 hr tablet, Take 1 tablet by mouth daily, Disp: , Rfl:     Multiple Vitamin (DAILY VALUE MULTIVITAMIN) TABS, Take 1 tablet by mouth daily, Disp: , Rfl:     neomycin-polymyxin-hydrocortisone (CORTISPORIN) otic solution, Administer 4 drops into both ears every 6 (six) hours for 7 days (Patient not taking: Reported on 5/25/2022), Disp: 10 mL, Rfl: 0  No Known Allergies  Vitals:    05/25/22 1000   BP: 142/70   BP Location: Left arm   Patient Position: Sitting   Cuff Size: Standard   Pulse: 62   Weight: 58 2 kg (128 lb 3 2 oz)   Height: 5' 4" (1 626 m)         Review of Systems:  Review of Systems   All other systems reviewed and are negative  Physical Exam:  Physical Exam  Vitals reviewed  Constitutional:       Appearance: She is well-developed  HENT:      Head: Normocephalic and atraumatic  Eyes:      Conjunctiva/sclera: Conjunctivae normal       Pupils: Pupils are equal, round, and reactive to light  Cardiovascular:      Rate and Rhythm: Normal rate  Heart sounds: Normal heart sounds  Pulmonary:      Effort: Pulmonary effort is normal       Breath sounds: Normal breath sounds  Musculoskeletal:      Cervical back: Normal range of motion and neck supple  Skin:     General: Skin is warm and dry  Neurological:      Mental Status: She is alert and oriented to person, place, and time  Discussion/Summary:  Will continue her present medical regimen  Will check thyroid and liver function prior to her next visit in reference to amiodarone  Will arrange initial interrogation of her [facility] [employer] biventricular device today  I have asked her to call in general if there is a problem in the interim otherwise I will see her in six months time

## 2022-05-24 ENCOUNTER — TELEPHONE (OUTPATIENT)
Dept: OTHER | Facility: OTHER | Age: 82
End: 2022-05-24

## 2022-05-25 ENCOUNTER — IN-CLINIC DEVICE VISIT (OUTPATIENT)
Dept: CARDIOLOGY CLINIC | Facility: CLINIC | Age: 82
End: 2022-05-25
Payer: MEDICARE

## 2022-05-25 ENCOUNTER — OFFICE VISIT (OUTPATIENT)
Dept: CARDIOLOGY CLINIC | Facility: CLINIC | Age: 82
End: 2022-05-25
Payer: MEDICARE

## 2022-05-25 VITALS
HEART RATE: 62 BPM | HEIGHT: 64 IN | DIASTOLIC BLOOD PRESSURE: 70 MMHG | BODY MASS INDEX: 21.89 KG/M2 | WEIGHT: 128.2 LBS | SYSTOLIC BLOOD PRESSURE: 142 MMHG

## 2022-05-25 DIAGNOSIS — E03.9 HYPOTHYROIDISM, UNSPECIFIED TYPE: ICD-10-CM

## 2022-05-25 DIAGNOSIS — I51.7 CARDIOMEGALY: ICD-10-CM

## 2022-05-25 DIAGNOSIS — I05.9 MITRAL VALVE DISEASE: ICD-10-CM

## 2022-05-25 DIAGNOSIS — I42.9 CARDIOMYOPATHY, UNSPECIFIED TYPE (HCC): Primary | ICD-10-CM

## 2022-05-25 DIAGNOSIS — Z95.810 ICD (IMPLANTABLE CARDIOVERTER-DEFIBRILLATOR) IN PLACE: ICD-10-CM

## 2022-05-25 DIAGNOSIS — Z95.810 PRESENCE OF AUTOMATIC CARDIOVERTER/DEFIBRILLATOR (AICD): Primary | ICD-10-CM

## 2022-05-25 PROCEDURE — 93284 PRGRMG EVAL IMPLANTABLE DFB: CPT | Performed by: INTERNAL MEDICINE

## 2022-05-25 PROCEDURE — 99204 OFFICE O/P NEW MOD 45 MIN: CPT | Performed by: INTERNAL MEDICINE

## 2022-05-25 PROCEDURE — 93000 ELECTROCARDIOGRAM COMPLETE: CPT | Performed by: INTERNAL MEDICINE

## 2022-05-25 NOTE — PATIENT INSTRUCTIONS
I will continue the patient's present medical regimen  The patient appears well compensated  I have asked the patient to call if there is a problem in the interim otherwise I will see the patient in six months time  Please check blood work prior to next visit

## 2022-05-25 NOTE — PROGRESS NOTES
Results for orders placed or performed in visit on 05/25/22   Cardiac EP device report    Narrative    DEVICE INTERROGATED IN THE [place] OFFICE: PT NEW TO [facility]: BATTERY VOLTAGE ADEQUATE (5 YRS)  AP: 90%  RVP: 99%  LVP: 99%  ALL LEAD PARAMETERS WITHIN NORMAL LIMITS  1 NON-SUSTV EPISODE W/ EGM SHOWING NSVT 11 BEATS  @185 BPM  1 ATR EPISODE W/ EGM SHOWING PAT, DURATION 9 SECS  PMT NOTED  PT TAKES ASA 81MG, METOPROLOL SUCC, AMIODARONE  NO EF AVAIL IN EPIC  NO PROGRAMMING CHANGES MADE TO DEVICE PARAMETERS  PT SEEN TODAY BY DR Collado Samples  APPROPRIATELY FUNCTIONING BI-V ICD   [address] [place] [address]

## 2022-06-10 ENCOUNTER — TELEPHONE (OUTPATIENT)
Dept: OBGYN CLINIC | Facility: HOSPITAL | Age: 82
End: 2022-06-10

## 2022-06-10 DIAGNOSIS — M17.11 PRIMARY OSTEOARTHRITIS OF RIGHT KNEE: Primary | ICD-10-CM

## 2022-06-10 NOTE — TELEPHONE ENCOUNTER
DR Cathy Philippe  RE: Authorization for visco injections  CB:     Patient called asking if authorization for visco injections can be started

## 2022-06-13 ENCOUNTER — OFFICE VISIT (OUTPATIENT)
Dept: FAMILY MEDICINE CLINIC | Facility: CLINIC | Age: 82
End: 2022-06-13
Payer: MEDICARE

## 2022-06-13 VITALS
OXYGEN SATURATION: 98 % | HEIGHT: 63 IN | HEART RATE: 61 BPM | WEIGHT: 128 LBS | SYSTOLIC BLOOD PRESSURE: 140 MMHG | RESPIRATION RATE: 18 BRPM | BODY MASS INDEX: 22.68 KG/M2 | TEMPERATURE: 98.9 F | DIASTOLIC BLOOD PRESSURE: 88 MMHG

## 2022-06-13 DIAGNOSIS — Z00.00 MEDICARE ANNUAL WELLNESS VISIT, SUBSEQUENT: Primary | ICD-10-CM

## 2022-06-13 DIAGNOSIS — G89.29 CHRONIC PAIN OF BOTH KNEES: ICD-10-CM

## 2022-06-13 DIAGNOSIS — I50.20 SYSTOLIC CONGESTIVE HEART FAILURE, UNSPECIFIED HF CHRONICITY (HCC): ICD-10-CM

## 2022-06-13 DIAGNOSIS — M81.0 OSTEOPOROSIS, UNSPECIFIED OSTEOPOROSIS TYPE, UNSPECIFIED PATHOLOGICAL FRACTURE PRESENCE: ICD-10-CM

## 2022-06-13 DIAGNOSIS — M25.562 CHRONIC PAIN OF BOTH KNEES: ICD-10-CM

## 2022-06-13 DIAGNOSIS — I42.9 CARDIOMYOPATHY, UNSPECIFIED TYPE (HCC): ICD-10-CM

## 2022-06-13 DIAGNOSIS — M25.561 CHRONIC PAIN OF BOTH KNEES: ICD-10-CM

## 2022-06-13 DIAGNOSIS — Z13.6 SCREENING FOR CARDIOVASCULAR CONDITION: ICD-10-CM

## 2022-06-13 PROBLEM — J30.89 ENVIRONMENTAL AND SEASONAL ALLERGIES: Status: ACTIVE | Noted: 2022-06-13

## 2022-06-13 PROCEDURE — 1123F ACP DISCUSS/DSCN MKR DOCD: CPT | Performed by: NURSE PRACTITIONER

## 2022-06-13 PROCEDURE — G0439 PPPS, SUBSEQ VISIT: HCPCS | Performed by: NURSE PRACTITIONER

## 2022-06-13 RX ORDER — LATANOPROST 50 UG/ML
SOLUTION/ DROPS OPHTHALMIC
COMMUNITY
Start: 2022-05-13

## 2022-06-13 NOTE — PROGRESS NOTES
Assessment and Plan:     Problem List Items Addressed This Visit        Cardiovascular and Mediastinum    Cardiomyopathy ([facility] [address] )    Relevant Orders    CBC and differential    Comprehensive metabolic panel    Lipid panel    CHF (congestive heart failure) ([facility])    Relevant Orders    CBC and differential    Comprehensive metabolic panel    Lipid panel       Musculoskeletal and Integument    Osteoporosis    Relevant Orders    DXA bone density spine hip and pelvis      Other Visit Diagnoses     Medicare annual wellness visit, subsequent    -  Primary    Relevant Orders    DXA bone density spine hip and pelvis    CBC and differential    Comprehensive metabolic panel    Lipid panel    Screening for cardiovascular condition        Relevant Orders    CBC and differential    Comprehensive metabolic panel    Lipid panel          Depression Screening and Follow-up Plan: Patient was screened for depression during today's encounter  They screened negative with a PHQ-2 score of 0  Preventive health issues were discussed with patient, and age appropriate screening tests were ordered as noted in patient's After Visit Summary  Personalized health advice and appropriate referrals for health education or preventive services given if needed, as noted in patient's After Visit Summary       History of Present Illness:     Patient presents for Medicare Annual Wellness visit    Patient Care Team:  Merrick Capellan as PCP - General (Family Medicine)  Narciso Joseph DO (Cardiology)  Aleida Sena MD (Orthopedic Surgery)     Problem List:     Patient Active Problem List   Diagnosis    Allergic rhinitis    Cardiomyopathy ([facility] [address] )    CHF (congestive heart failure) ([facility] [address] )    Cough, persistent    GERD without esophagitis    Hypertension    Osteoporosis    TMJ syndrome    Positive 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 disease    Helicobacter pylori gastrointestinal tract infection    AICD (automatic cardioverter/defibrillator) present    Other specified glaucoma    Acute otitis externa of left ear    Impacted cerumen of left ear    Primary osteoarthritis of right knee    Environmental and seasonal allergies      Past Medical and Surgical History:     Past Medical History:   Diagnosis Date    Cardiomyopathy ([address] )     Chronic diarrhea of unknown origin     resolved 12/22/2015    Contact dermatitis     last assessed 04/19/2013    Esophageal reflux     resolved 07/01/2016    Hyperlipidemia     resolved 12/22/2015    Muscle weakness (generalized)     resolved 12/22/2015    Pacemaker     Reactive airway disease     resolved 12/22/2015     Past Surgical History:   Procedure Laterality Date    LEG SURGERY Right     OTHER SURGICAL HISTORY      open treatment of weight bearing distal tibial fracture      Family History:     Family History   Problem Relation Age of Onset    Heart disease Father     Diabetes Daughter     Mental illness Daughter         disorder      Social History:     Social History     Socioeconomic History    Marital status:      Spouse name: None    Number of children: None    Years of education: None    Highest education level: None   Occupational History    Occupation: retired   Tobacco Use    Smoking status: Never Smoker    Smokeless tobacco: Never Used   Vaping Use    Vaping Use: Never used   Substance and Sexual Activity    Alcohol use: No     Comment: denies alcohol use causing problems    Drug use: No    Sexual activity: None   Other Topics Concern    None   Social History Narrative      Social Determinants of Health     Financial Resource Strain: Not on file   Food Insecurity: Not on file   Transportation Needs: Not on file   Physical Activity: Insufficiently Active    Days of Exercise per Week: 2 days    Minutes of Exercise per Session: 30 min   Stress: No Stress Concern Present    Feeling of Stress :  Only a little   Social Connections: Not on file   Intimate Partner Violence: Not At Risk    Fear of Current or Ex-Partner: No    Emotionally Abused: No    Physically Abused: No    Sexually Abused: No   Housing Stability: Not on file      Medications and Allergies:     Current Outpatient Medications   Medication Sig Dispense Refill    amiodarone 200 mg tablet Take 0 5 tablets by mouth daily      aspirin 81 MG tablet Take 1 tablet by mouth daily      brimonidine tartrate 0 2 % ophthalmic solution 1 drop 3 (three) times a day      Cholecalciferol 50 MCG (2000 UT) CAPS Take 1 capsule by mouth daily      fluticasone (FLONASE) 50 mcg/act nasal spray 1 spray into each nostril daily 9 9 mL 1    furosemide (LASIX) 40 mg tablet Take 40 mg by mouth daily        latanoprost (XALATAN) 0 005 % ophthalmic solution       loratadine (CLARITIN) 10 mg tablet Take 1 tablet (10 mg total) by mouth daily 30 tablet 3    losartan (COZAAR) 25 mg tablet Take 1 tablet by mouth daily      metoprolol succinate (TOPROL-XL) 50 mg 24 hr tablet Take 1 tablet by mouth daily      Multiple Vitamin (DAILY VALUE MULTIVITAMIN) TABS Take 1 tablet by mouth daily       No current facility-administered medications for this visit  No Known Allergies   Immunizations:     Immunization History   Administered Date(s) Administered    COVID-19 PFIZER VACCINE 0 3 ML IM 01/20/2021, 02/11/2021, 11/02/2021, 02/01/2022    INFLUENZA 09/20/2018    Influenza Split High Dose Preservative Free IM 10/09/2012, 09/26/2013, 09/29/2014, 10/28/2015, 09/29/2016, 10/17/2017    Influenza, high dose seasonal 0 7 mL 09/20/2018    Pneumococcal Conjugate 13-Valent 12/22/2015    Pneumococcal Polysaccharide PPV23 10/17/2005, 11/10/2011    Tuberculin Skin Test-PPD Intradermal 11/13/2006, 11/20/2006      Health Maintenance: There are no preventive care reminders to display for this patient        Topic Date Due    DTaP,Tdap,and Td Vaccines (1 - Tdap) Never done    Influenza Vaccine (Season Ended) 09/01/2022      Medicare Health Risk Assessment:     /84 (BP Location: Left arm, Patient Position: Sitting, Cuff Size: Standard)   Pulse 61   Temp 98 9 °F (37 2 °C) (Tympanic)   Resp 18   Ht 5' 3 3" (1 608 m)   Wt 58 1 kg (128 lb)   LMP  (LMP Unknown)   SpO2 98%   BMI 22 46 kg/m²      Belem Devine is here for her Subsequent Wellness visit  Last Medicare Wellness visit information reviewed, patient interviewed and updates made to the record today  Health Risk Assessment:   Patient rates overall health as good  Patient feels that their physical health rating is same  Patient is satisfied with their life  Eyesight was rated as same  Hearing was rated as same  Patient feels that their emotional and mental health rating is same  Patients states they are sometimes angry  Patient states they are often unusually tired/fatigued  Pain experienced in the last 7 days has been some  Patient's pain rating has been 8/10  Patient states that she has experienced no weight loss or gain in last 6 months  Depression Screening:   PHQ-2 Score: 0      Fall Risk Screening: In the past year, patient has experienced: no history of falling in past year      Urinary Incontinence Screening:   Patient has not leaked urine accidently in the last six months  Home Safety:  Patient does not have trouble with stairs inside or outside of their home  Patient has working smoke alarms and has working carbon monoxide detector  Home safety hazards include: none  Nutrition:   Current diet is Regular  Medications:   Patient is currently taking over-the-counter supplements  OTC medications include: see medication list  Patient is able to manage medications  Activities of Daily Living (ADLs)/Instrumental Activities of Daily Living (IADLs):   Walk and transfer into and out of bed and chair?: Yes  Dress and groom yourself?: Yes    Bathe or shower yourself?: Yes    Feed yourself?  Yes  Do your laundry/housekeeping?: Yes  Manage your money, pay your bills and track your expenses?: Yes  Make your own meals?: Yes    Do your own shopping?: Yes    Advance Care Planning:   Living will: Yes    Durable POA for healthcare: Yes    Advanced directive: Yes      Cognitive Screening:   Provider or family/friend/caregiver concerned regarding cognition?: No    PREVENTIVE SCREENINGS      Cardiovascular Screening:    General: Risks and Benefits Discussed    Due for: Lipid Panel      Diabetes Screening:     General: Risks and Benefits Discussed    Due for: Blood Glucose      Cervical Cancer Screening:    General: Screening Not Indicated      Osteoporosis Screening:    General: History Osteoporosis and Risks and Benefits Discussed    Due for: Bone Density Ultrasound      Lung Cancer Screening:     General: Screening Not Indicated    Screening, Brief Intervention, and Referral to Treatment (SBIRT)    Screening  Typical number of drinks in a day: 0  Typical number of drinks in a week: 0  Interpretation: Low risk drinking behavior  Single Item Drug Screening:  How often have you used an illegal drug (including marijuana) or a prescription medication for non-medical reasons in the past year? never    Single Item Drug Screen Score: 0  Interpretation: Negative screen for possible drug use disorder       Physical Exam  Vitals reviewed  Constitutional:       General: She is not in acute distress  Appearance: Normal appearance  She is normal weight  She is not ill-appearing  HENT:      Head: Normocephalic and atraumatic  Right Ear: Tympanic membrane, ear canal and external ear normal  There is no impacted cerumen  Left Ear: Tympanic membrane, ear canal and external ear normal  There is no impacted cerumen  Nose: Nose normal  No congestion or rhinorrhea  Mouth/Throat:      Mouth: Mucous membranes are moist       Pharynx: Oropharynx is clear  No oropharyngeal exudate or posterior oropharyngeal erythema     Eyes:      Extraocular Movements: Extraocular movements intact  Conjunctiva/sclera: Conjunctivae normal    Cardiovascular:      Rate and Rhythm: Normal rate and regular rhythm  Pulses: Normal pulses  Heart sounds: Normal heart sounds  No murmur heard  Pulmonary:      Effort: Pulmonary effort is normal  No respiratory distress  Breath sounds: Normal breath sounds  No wheezing  Abdominal:      General: Bowel sounds are normal  There is no distension  Palpations: Abdomen is soft  Tenderness: There is no abdominal tenderness  Musculoskeletal:         General: Normal range of motion  Cervical back: Normal range of motion and neck supple  Right lower leg: No edema  Left lower leg: No edema  Lymphadenopathy:      Cervical: No cervical adenopathy  Skin:     General: Skin is warm and dry  Capillary Refill: Capillary refill takes less than 2 seconds  Neurological:      General: No focal deficit present  Mental Status: She is alert and oriented to person, place, and time  Psychiatric:         Mood and Affect: Mood normal          Behavior: Behavior normal          Thought Content:  Thought content normal          Judgment: Judgment normal            CARA Neal

## 2022-06-13 NOTE — PATIENT INSTRUCTIONS
Medicare Preventive Visit Patient Instructions  Thank you for completing your Welcome to Medicare Visit or Medicare Annual Wellness Visit today  Your next wellness visit will be due in one year (6/14/2023)  The screening/preventive services that you may require over the next 5-10 years are detailed below  Some tests may not apply to you based off risk factors and/or age  Screening tests ordered at today's visit but not completed yet may show as past due  Also, please note that scanned in results may not display below  Preventive Screenings:  Service Recommendations Previous Testing/Comments   Colorectal Cancer Screening  * Colonoscopy    * Fecal Occult Blood Test (FOBT)/Fecal Immunochemical Test (FIT)  * Fecal DNA/Cologuard Test  * Flexible Sigmoidoscopy Age: 54-65 years old   Colonoscopy: every 10 years (may be performed more frequently if at higher risk)  OR  FOBT/FIT: every 1 year  OR  Cologuard: every 3 years  OR  Sigmoidoscopy: every 5 years  Screening may be recommended earlier than age 48 if at higher risk for colorectal cancer  Also, an individualized decision between you and your healthcare provider will decide whether screening between the ages of 74-80 would be appropriate  Colonoscopy: 05/29/2012  FOBT/FIT: Not on file  Cologuard: Not on file  Sigmoidoscopy: Not on file          Breast Cancer Screening Age: 36 years old  Frequency: every 1-2 years  Not required if history of left and right mastectomy Mammogram: 11/06/2018        Cervical Cancer Screening Between the ages of 21-29, pap smear recommended once every 3 years  Between the ages of 33-67, can perform pap smear with HPV co-testing every 5 years     Recommendations may differ for women with a history of total hysterectomy, cervical cancer, or abnormal pap smears in past  Pap Smear: Not on file        Hepatitis C Screening Once for adults born between [facility]  More frequently in patients at high risk for Hepatitis C Hep C Antibody: Not on file        Diabetes Screening 1-2 times per year if you're at risk for diabetes or have pre-diabetes Fasting glucose: 86 mg/dL   A1C: No results in last 5 years        Cholesterol Screening Once every 5 years if you don't have a lipid disorder  May order more often based on risk factors  Lipid panel: 09/13/2018          Other Preventive Screenings Covered by Medicare:  1  Abdominal Aortic Aneurysm (AAA) Screening: covered once if your at risk  You're considered to be at risk if you have a family history of AAA  2  Lung Cancer Screening: covers low dose CT scan once per year if you meet all of the following conditions: (1) Age 50-69; (2) No signs or symptoms of lung cancer; (3) Current smoker or have quit smoking within the last 15 years; (4) You have a tobacco smoking history of at least 30 pack years (packs per day multiplied by number of years you smoked); (5) You get a written order from a healthcare provider  3  Glaucoma Screening: covered annually if you're considered high risk: (1) You have diabetes OR (2) Family history of glaucoma OR (3)  aged 48 and older OR (3)  American aged 72 and older  3  Osteoporosis Screening: covered every 2 years if you meet one of the following conditions: (1) You're estrogen deficient and at risk for osteoporosis based off medical history and other findings; (2) Have a vertebral abnormality; (3) On glucocorticoid therapy for more than 3 months; (4) Have primary hyperparathyroidism; (5) On osteoporosis medications and need to assess response to drug therapy  · Last bone density test (DXA Scan): 11/06/2018  5  HIV Screening: covered annually if you're between the age of 12-76  Also covered annually if you are younger than 13 and older than 72 with risk factors for HIV infection  For pregnant patients, it is covered up to 3 times per pregnancy      Immunizations:  Immunization Recommendations   Influenza Vaccine Annual influenza vaccination during flu season is recommended for all persons aged >= 6 months who do not have contraindications   Pneumococcal Vaccine (Prevnar and Pneumovax)  * Prevnar = PCV13  * Pneumovax = PPSV23   Adults 25-60 years old: 1-3 doses may be recommended based on certain risk factors  Adults 72 years old: Prevnar (PCV13) vaccine recommended followed by Pneumovax (PPSV23) vaccine  If already received PPSV23 since turning 65, then PCV13 recommended at least one year after PPSV23 dose  Hepatitis B Vaccine 3 dose series if at intermediate or high risk (ex: diabetes, end stage renal disease, liver disease)   Tetanus (Td) Vaccine - COST NOT COVERED BY MEDICARE PART B Following completion of primary series, a booster dose should be given every 10 years to maintain immunity against tetanus  Td may also be given as tetanus wound prophylaxis  Tdap Vaccine - COST NOT COVERED BY MEDICARE PART B Recommended at least once for all adults  For pregnant patients, recommended with each pregnancy  Shingles Vaccine (Shingrix) - COST NOT COVERED BY MEDICARE PART B  2 shot series recommended in those aged 48 and above     Health Maintenance Due:  There are no preventive care reminders to display for this patient  Immunizations Due:      Topic Date Due    COVID-19 Vaccine (1) Never done    DTaP,Tdap,and Td Vaccines (1 - Tdap) Never done    Influenza Vaccine (Season Ended) 09/01/2022     Advance Directives   What are advance directives? Advance directives are legal documents that state your wishes and plans for medical care  These plans are made ahead of time in case you lose your ability to make decisions for yourself  Advance directives can apply to any medical decision, such as the treatments you want, and if you want to donate organs  What are the types of advance directives? There are many types of advance directives, and each state has rules about how to use them  You may choose a combination of any of the following:  · Living will:   This is a written record of the treatment you want  You can also choose which treatments you do not want, which to limit, and which to stop at a certain time  This includes surgery, medicine, IV fluid, and tube feedings  · Durable power of  for healthcare [facility]): This is a written record that states who you want to make healthcare choices for you when you are unable to make them for yourself  This person, called a proxy, is usually a family member or a friend  You may choose more than 1 proxy  · Do not resuscitate (DNR) order:  A DNR order is used in case your heart stops beating or you stop breathing  It is a request not to have certain forms of treatment, such as CPR  A DNR order may be included in other types of advance directives  · Medical directive: This covers the care that you want if you are in a coma, near death, or unable to make decisions for yourself  You can list the treatments you want for each condition  Treatment may include pain medicine, surgery, blood transfusions, dialysis, IV or tube feedings, and a ventilator (breathing machine)  · Values history: This document has questions about your views, beliefs, and how you feel and think about life  This information can help others choose the care that you would choose  Why are advance directives important? An advance directive helps you control your care  Although spoken wishes may be used, it is better to have your wishes written down  Spoken wishes can be misunderstood, or not followed  Treatments may be given even if you do not want them  An advance directive may make it easier for your family to make difficult choices about your care  © Copyright [employer] 2018 Information is for End User's use only and may not be sold, redistributed or otherwise used for commercial purposes   All illustrations and images included in CareNotes® are the copyrighted property of [employer]  or [facility]

## 2022-07-13 ENCOUNTER — APPOINTMENT (OUTPATIENT)
Dept: RADIOLOGY | Facility: CLINIC | Age: 82
End: 2022-07-13
Payer: MEDICARE

## 2022-07-13 ENCOUNTER — PROCEDURE VISIT (OUTPATIENT)
Dept: OBGYN CLINIC | Facility: CLINIC | Age: 82
End: 2022-07-13
Payer: MEDICARE

## 2022-07-13 VITALS
SYSTOLIC BLOOD PRESSURE: 138 MMHG | HEIGHT: 63 IN | WEIGHT: 129 LBS | DIASTOLIC BLOOD PRESSURE: 80 MMHG | BODY MASS INDEX: 22.86 KG/M2

## 2022-07-13 DIAGNOSIS — M25.562 CHRONIC PAIN OF LEFT KNEE: ICD-10-CM

## 2022-07-13 DIAGNOSIS — M25.562 LEFT KNEE PAIN, UNSPECIFIED CHRONICITY: ICD-10-CM

## 2022-07-13 DIAGNOSIS — M17.11 PRIMARY OSTEOARTHRITIS OF RIGHT KNEE: Primary | ICD-10-CM

## 2022-07-13 DIAGNOSIS — G89.29 CHRONIC PAIN OF LEFT KNEE: ICD-10-CM

## 2022-07-13 DIAGNOSIS — M17.12 PRIMARY OSTEOARTHRITIS OF LEFT KNEE: ICD-10-CM

## 2022-07-13 PROCEDURE — 99214 OFFICE O/P EST MOD 30 MIN: CPT | Performed by: PHYSICIAN ASSISTANT

## 2022-07-13 PROCEDURE — 77073 BONE LENGTH STUDIES: CPT

## 2022-07-13 PROCEDURE — 20610 DRAIN/INJ JOINT/BURSA W/O US: CPT | Performed by: PHYSICIAN ASSISTANT

## 2022-07-13 PROCEDURE — 73564 X-RAY EXAM KNEE 4 OR MORE: CPT

## 2022-07-13 NOTE — PROGRESS NOTES
Assessment:     1  Primary osteoarthritis of right knee    2  Chronic pain of left knee    3  Primary osteoarthritis of left knee        Plan:      Problem List Items Addressed This Visit        Musculoskeletal and Integument    Primary osteoarthritis of right knee - Primary    Relevant Orders    Large joint arthrocentesis: R knee    Primary osteoarthritis of left knee       Other    Chronic pain of left knee    Relevant Orders    XR knee 4+ vw left injury    XR bone length (scanogram)    Ambulatory Referral to Physical Therapy        The patient's right knee was injected with her 1st set of Orthovisc  She tolerated the injection quite well  X-rays of the patient's left knee are consistent with prior ORIF of her distal femur fracture  There femur has healed with valgus angulation  There are no dislocations  Leg length x-rays are consistent with an old healed fracture of her right femur shaft  The right leg has a varus alignment while the left leg has a valgus alignment  There is a leg length discrepancy along the right leg  Possible treatment options were discussed with the patient with regards to her left knee pain  She was given a referral for formal physical therapy  She will follow up next week for her 2nd set of Orthovisc  All patient's questions were answered to her satisfaction  This note is created using dictation transcription  It may contain typographical errors, grammatical errors, improperly dictated words, background noise and other errors  Subjective:     Patient ID: Darrion Mccray is a 80 y o  female  Chief Complaint:  45-year-old female with a history of primary osteoarthritis of her right knee presents to our office to begin viscosupplementation  She is here to start Orthovisc injections  She still complains of right knee pain  The patient is also complaining of left distal thigh pain    The patient states that approximately 2 years ago she underwent an [place]IF of her left femur in [place]  She states that since the surgery she has had continued pain and discomfort in that area  She states that her pain is worsened with ambulation  She denies any numbness or tingling  She denies any fever or chills  She denies any new falls or trauma  Information on patient's intake form was reviewed  Allergy:  No Known Allergies  Medications:  current meds:   No current facility-administered medications for this visit  Past Medical History:  Past Medical History:   Diagnosis Date    Cardiomyopathy ([address] )     Chronic diarrhea of unknown origin     resolved 12/22/2015    Contact dermatitis     last assessed 04/19/2013    Esophageal reflux     resolved 07/01/2016    Hyperlipidemia     resolved 12/22/2015    Muscle weakness (generalized)     resolved 12/22/2015    Pacemaker     Reactive airway disease     resolved 12/22/2015     Past Surgical History:  Past Surgical History:   Procedure Laterality Date    LEG SURGERY Right     OTHER SURGICAL HISTORY      open treatment of weight bearing distal tibial fracture     Family History:  Family History   Problem Relation Age of Onset    Heart disease Father     Diabetes Daughter     Mental illness Daughter         disorder     Social History:  Social History     Substance and Sexual Activity   Alcohol Use No    Comment: denies alcohol use causing problems     Social History     Substance and Sexual Activity   Drug Use No     Social History     Tobacco Use   Smoking Status Never Smoker   Smokeless Tobacco Never Used     Review of Systems   Constitutional: Negative for chills, fever and unexpected weight change  HENT: Negative for hearing loss, nosebleeds and sore throat  Eyes: Negative for pain, redness and visual disturbance  Respiratory: Negative for cough, shortness of breath and wheezing  Cardiovascular: Negative for chest pain, palpitations and leg swelling  Gastrointestinal: Negative for abdominal pain, nausea and vomiting  Endocrine: Negative for polydipsia and polyuria  Genitourinary: Negative for dysuria and hematuria  Musculoskeletal: Positive for arthralgias (Right knee and left thigh), gait problem (Ambulate with a cane) and myalgias  Negative for back pain and joint swelling  As noted in HPI   Skin: Negative for rash and wound  Neurological: Negative for dizziness, numbness and headaches  Psychiatric/Behavioral: Negative for decreased concentration and suicidal ideas  The patient is not nervous/anxious  Objective:  BP Readings from Last 1 Encounters:   07/13/22 138/80      Wt Readings from Last 1 Encounters:   07/13/22 58 5 kg (129 lb)      BMI:   Estimated body mass index is 22 85 kg/m² as calculated from the following:    Height as of this encounter: 5' 3" (1 6 m)  Weight as of this encounter: 58 5 kg (129 lb)  BSA:   Estimated body surface area is 1 6 meters squared as calculated from the following:    Height as of this encounter: 5' 3" (1 6 m)  Weight as of this encounter: 58 5 kg (129 lb)  Physical Exam  Vitals and nursing note reviewed  Constitutional:       Appearance: Normal appearance  She is well-developed  HENT:      Head: Normocephalic and atraumatic  Right Ear: External ear normal       Left Ear: External ear normal    Eyes:      Extraocular Movements: Extraocular movements intact  Conjunctiva/sclera: Conjunctivae normal    Pulmonary:      Effort: Pulmonary effort is normal    Musculoskeletal:      Cervical back: Neck supple  Right knee: No effusion  Instability Tests: Medial Fanny test negative and lateral Fanny test negative  Left knee: No effusion  Instability Tests: Medial Fanny test negative and lateral Fanny test negative  Skin:     General: Skin is warm and dry  Neurological:      Mental Status: She is alert and oriented to person, place, and time  Deep Tendon Reflexes: Reflexes are normal and symmetric     Psychiatric: Mood and Affect: Mood normal          Behavior: Behavior normal        Right Knee Exam     Muscle Strength   The patient has normal right knee strength  Tenderness   The patient is experiencing tenderness in the medial joint line  Range of Motion   Extension: 0   Flexion: 100     Tests   Fanny:  Medial - negative Lateral - negative  Varus: negative Valgus: negative  Patellar apprehension: negative    Other   Erythema: absent  Sensation: normal  Pulse: present  Swelling: none  Effusion: no effusion present    Comments:  Leg length discrepancy  Varus alignment      Left Knee Exam     Muscle Strength   The patient has normal left knee strength  Tenderness   Left knee tenderness location: Lateral aspect of the distal thigh  Range of Motion   Extension: 5   Flexion: 100     Tests   Fanny:  Medial - negative Lateral - negative  Varus: negative Valgus: negative  Patellar apprehension: negative    Other   Erythema: absent  Scars: present (Incision well healed  No signs of infection)  Sensation: normal  Pulse: present  Swelling: none  Effusion: no effusion present    Comments:  Valgus alignment            X-rays of the patient's left knee are consistent with an ORIF of her distal femur  There femur has healed angulated  There are no dislocations  Leg length x-rays are consistent with an old healed fracture of her right femur  The right leg has a varus deformity while the left leg has a valgus deformity  There is a leg length discrepancy along the right leg       Large joint arthrocentesis: R knee  Universal Protocol:  Risks and benefits: risks, benefits and alternatives were discussed  Consent given by: patient  Patient understanding: patient states understanding of the procedure being performed  Site marked: the operative site was marked  Supporting Documentation  Indications: pain   Procedure Details  Location: knee - R knee  Preparation: Patient was prepped and draped in the usual sterile fashion  Needle size: 22 G  Ultrasound guidance: no  Approach: lateral  Medications administered: 30 mg sodium hyaluronate 30 mg/2 mL    Patient tolerance: patient tolerated the procedure well with no immediate complications  Dressing:  Sterile dressing applied

## 2022-07-20 ENCOUNTER — PROCEDURE VISIT (OUTPATIENT)
Dept: OBGYN CLINIC | Facility: CLINIC | Age: 82
End: 2022-07-20
Payer: MEDICARE

## 2022-07-20 VITALS
WEIGHT: 128.2 LBS | HEIGHT: 66 IN | DIASTOLIC BLOOD PRESSURE: 80 MMHG | SYSTOLIC BLOOD PRESSURE: 134 MMHG | BODY MASS INDEX: 20.6 KG/M2

## 2022-07-20 DIAGNOSIS — M17.11 PRIMARY OSTEOARTHRITIS OF RIGHT KNEE: Primary | ICD-10-CM

## 2022-07-20 PROCEDURE — 20610 DRAIN/INJ JOINT/BURSA W/O US: CPT | Performed by: PHYSICIAN ASSISTANT

## 2022-07-20 NOTE — PROGRESS NOTES
Assessment:     1  Primary osteoarthritis of right knee        Plan:     Problem List Items Addressed This Visit        Musculoskeletal and Integument    Primary osteoarthritis of right knee - Primary     Findings consistent with right knee osteoarthritis  Findings and treatment options were discussed with the patient  The 2nd of 3 right knee Orthovisc injections was given today  She tolerated the procedure well  Advised to apply cold compress today  She was given a prescription to be fitted for new shoe lifts with the right side for known leg-length discrepancy  Follow-up in 1 week for the 3rd injection  All questions were answered to patient's satisfaction  Relevant Medications    sodium hyaluronate (ORTHOVISC) injection SOSY 30 mg (Completed)    Other Relevant Orders    Large joint arthrocentesis: R knee (Completed)    Durable Medical Equipment         Subjective:     Patient ID: Annie Forbes is a 80 y o  female  Chief Complaint: This is an 69-year-old white female here for follow-up of right knee osteoarthritis  She is here for the 2nd of 3 right knee Orthovisc injections  No issues after the 1st injection  She is inquiring about getting fitted for new shoe lifts on the right side  She has known leg-length discrepancy with right lower leg shorter than left      Allergy:  No Known Allergies  Medications:  all current active meds have been reviewed  Past Medical History:  Past Medical History:   Diagnosis Date    [address] [address]     Chronic diarrhea of unknown origin     resolved 12/22/2015    Contact dermatitis     last assessed 04/19/2013    Esophageal reflux     resolved 07/01/2016    Hyperlipidemia     resolved 12/22/2015    Muscle weakness (generalized)     resolved 12/22/2015    Pacemaker     Reactive airway disease     resolved 12/22/2015     Past Surgical History:  Past Surgical History:   Procedure Laterality Date    LEG SURGERY Right     OTHER SURGICAL HISTORY      open treatment of weight bearing distal tibial fracture     Family History:  Family History   Problem Relation Age of Onset    Heart disease Father     Diabetes Daughter     Mental illness Daughter         disorder     Social History:  Social History     Substance and Sexual Activity   Alcohol Use No    Comment: denies alcohol use causing problems     Social History     Substance and Sexual Activity   Drug Use No     Social History     Tobacco Use   Smoking Status Never Smoker   Smokeless Tobacco Never Used     Review of Systems   Constitutional: Negative  HENT: Negative  Eyes: Negative  Respiratory: Negative  Cardiovascular: Negative  Gastrointestinal: Negative  Endocrine: Negative  Genitourinary: Negative  Musculoskeletal: Positive for arthralgias and gait problem  Skin: Negative  Allergic/Immunologic: Negative  Hematological: Negative  Psychiatric/Behavioral: Negative  Objective:  BP Readings from Last 1 Encounters:   07/20/22 134/80      Wt Readings from Last 1 Encounters:   07/20/22 58 2 kg (128 lb 3 2 oz)      BMI:   Estimated body mass index is 20 69 kg/m² as calculated from the following:    Height as of this encounter: 5' 6" (1 676 m)  Weight as of this encounter: 58 2 kg (128 lb 3 2 oz)  BSA:   Estimated body surface area is 1 66 meters squared as calculated from the following:    Height as of this encounter: 5' 6" (1 676 m)  Weight as of this encounter: 58 2 kg (128 lb 3 2 oz)  Physical Exam  Constitutional:       General: She is not in acute distress  Appearance: She is well-developed  HENT:      Head: Normocephalic  Eyes:      Conjunctiva/sclera: Conjunctivae normal       Pupils: Pupils are equal, round, and reactive to light  Pulmonary:      Effort: Pulmonary effort is normal  No respiratory distress  Musculoskeletal:      Right knee: No effusion  Skin:     General: Skin is warm and dry     Neurological:      Mental Status: She is alert and oriented to person, place, and time  Psychiatric:         Behavior: Behavior normal        Right Knee Exam     Tenderness   The patient is experiencing tenderness in the medial joint line  Range of Motion   Extension: normal   Flexion: 100     Tests   Varus: negative Valgus: negative    Other   Erythema: absent  Scars: absent  Sensation: normal  Pulse: present  Swelling: none  Effusion: no effusion present    Comments:  Patellofemoral crepitation            No new imaging  Large joint arthrocentesis: R knee  Universal Protocol:  Consent: Verbal consent obtained    Risks and benefits: risks, benefits and alternatives were discussed  Consent given by: patient  Patient understanding: patient states understanding of the procedure being performed    Supporting Documentation  Indications: pain   Procedure Details  Location: knee - R knee  Preparation: Patient was prepped and draped in the usual sterile fashion  Needle size: 22 G  Approach: anterolateral  Medications administered: 30 mg sodium hyaluronate 30 mg/2 mL    Patient tolerance: patient tolerated the procedure well with no immediate complications  Dressing:  Sterile dressing applied

## 2022-07-20 NOTE — ASSESSMENT & PLAN NOTE
Findings consistent with right knee osteoarthritis  Findings and treatment options were discussed with the patient  The 2nd of 3 right knee Orthovisc injections was given today  She tolerated the procedure well  Advised to apply cold compress today  She was given a prescription to be fitted for new shoe lifts with the right side for known leg-length discrepancy  Follow-up in 1 week for the 3rd injection  All questions were answered to patient's satisfaction

## 2022-07-21 ENCOUNTER — TELEPHONE (OUTPATIENT)
Dept: OBGYN CLINIC | Facility: HOSPITAL | Age: 82
End: 2022-07-21

## 2022-07-21 NOTE — TELEPHONE ENCOUNTER
We already measure her legs with x-rays  Her right leg is about 1 9 cm shorter than left    She just need to tailor the  to put about 1 5 cm on the right

## 2022-07-21 NOTE — TELEPHONE ENCOUNTER
Patient sees Dr Chely Dangelo      Patient is calling stating she was advised to go to a "shoe repair man" to have her leg measured and she was advised by them they do not do this  She is asking if Dr Chely Dangelo and measure her leg length so she can get a new lift shoe           CB: [phone]

## 2022-07-28 ENCOUNTER — PROCEDURE VISIT (OUTPATIENT)
Dept: OBGYN CLINIC | Facility: CLINIC | Age: 82
End: 2022-07-28
Payer: MEDICARE

## 2022-07-28 ENCOUNTER — APPOINTMENT (OUTPATIENT)
Dept: RADIOLOGY | Facility: CLINIC | Age: 82
End: 2022-07-28
Payer: MEDICARE

## 2022-07-28 VITALS
SYSTOLIC BLOOD PRESSURE: 126 MMHG | HEIGHT: 66 IN | BODY MASS INDEX: 20.57 KG/M2 | WEIGHT: 128 LBS | DIASTOLIC BLOOD PRESSURE: 84 MMHG

## 2022-07-28 DIAGNOSIS — M17.12 PRIMARY OSTEOARTHRITIS OF LEFT KNEE: ICD-10-CM

## 2022-07-28 DIAGNOSIS — M17.11 PRIMARY OSTEOARTHRITIS OF RIGHT KNEE: Primary | ICD-10-CM

## 2022-07-28 DIAGNOSIS — M17.11 PRIMARY OSTEOARTHRITIS OF RIGHT KNEE: ICD-10-CM

## 2022-07-28 DIAGNOSIS — M21.70 LEG LENGTH DISCREPANCY: ICD-10-CM

## 2022-07-28 PROCEDURE — 99213 OFFICE O/P EST LOW 20 MIN: CPT | Performed by: PHYSICIAN ASSISTANT

## 2022-07-28 PROCEDURE — 20610 DRAIN/INJ JOINT/BURSA W/O US: CPT | Performed by: PHYSICIAN ASSISTANT

## 2022-07-28 PROCEDURE — 77073 BONE LENGTH STUDIES: CPT

## 2022-07-28 NOTE — PROGRESS NOTES
Assessment:     1  Primary osteoarthritis of right knee    2  Leg length discrepancy        Plan:     Problem List Items Addressed This Visit        Musculoskeletal and Integument    Primary osteoarthritis of right knee - Primary    Relevant Orders    XR bone length (scanogram)       Other    Leg length discrepancy    Relevant Orders    Durable Medical Equipment          Findings consistent with right knee osteoarthritis and leg length discrepancy, right leg shorter than left  Scanogram reveals right LE 0 75 cm shorter than left  Prescription for new shoe lift was given  She was also given the 3rd of 3 right knee Orthovisc injections  She tolerated the procedure well  Advised to apply cold compress today  Follow-up as needed if symptoms return  Advised patient the soonest she can have a no other series is in 6 months  All questions were answered to patient's satisfaction  Subjective:     Patient ID: Hans Shelton is a 80 y o  female  Chief Complaint: This is an 80-year-old white female here for follow-up of right knee osteoarthritis  She is here for the 3rd of 3 right knee Orthovisc injections  No issues after the 2nd injection  She is inquiring about getting fitted for new shoe lifts on the right side  She has known leg-length discrepancy with right lower leg shorter than left  She has half an inch lift at the heel      Allergy:  No Known Allergies  Medications:  all current active meds have been reviewed  Past Medical History:  Past Medical History:   Diagnosis Date    Cardiomyopathy [address] [address]     Chronic diarrhea of unknown origin     resolved 12/22/2015    Contact dermatitis     last assessed 04/19/2013    Esophageal reflux     resolved 07/01/2016    Hyperlipidemia     resolved 12/22/2015    Muscle weakness (generalized)     resolved 12/22/2015    Pacemaker     Reactive airway disease     resolved 12/22/2015     Past Surgical History:  Past Surgical History:   Procedure Laterality Date    LEG SURGERY Right     OTHER SURGICAL HISTORY      open treatment of weight bearing distal tibial fracture     Family History:  Family History   Problem Relation Age of Onset    Heart disease Father     Diabetes Daughter     Mental illness Daughter         disorder     Social History:  Social History     Substance and Sexual Activity   Alcohol Use No    Comment: denies alcohol use causing problems     Social History     Substance and Sexual Activity   Drug Use No     Social History     Tobacco Use   Smoking Status Never Smoker   Smokeless Tobacco Never Used     Review of Systems   Constitutional: Negative  HENT: Negative  Eyes: Negative  Respiratory: Negative  Cardiovascular: Negative  Gastrointestinal: Negative  Endocrine: Negative  Genitourinary: Negative  Musculoskeletal: Positive for arthralgias and gait problem (uses a cane)  Skin: Negative  Allergic/Immunologic: Negative  Hematological: Negative  Psychiatric/Behavioral: Negative  Objective:  BP Readings from Last 1 Encounters:   07/28/22 126/84      Wt Readings from Last 1 Encounters:   07/28/22 58 1 kg (128 lb)      BMI:   Estimated body mass index is 20 66 kg/m² as calculated from the following:    Height as of this encounter: 5' 6" (1 676 m)  Weight as of this encounter: 58 1 kg (128 lb)  BSA:   Estimated body surface area is 1 66 meters squared as calculated from the following:    Height as of this encounter: 5' 6" (1 676 m)  Weight as of this encounter: 58 1 kg (128 lb)  Physical Exam  Constitutional:       General: She is not in acute distress  Appearance: She is well-developed  HENT:      Head: Normocephalic  Eyes:      Conjunctiva/sclera: Conjunctivae normal       Pupils: Pupils are equal, round, and reactive to light  Pulmonary:      Effort: Pulmonary effort is normal  No respiratory distress  Musculoskeletal:      Right knee: No effusion  Skin:     General: Skin is warm and dry  Neurological:      Mental Status: She is alert and oriented to person, place, and time  Psychiatric:         Behavior: Behavior normal        Right Knee Exam     Tenderness   The patient is experiencing tenderness in the medial joint line  Range of Motion   Extension: normal   Flexion: 100     Tests   Varus: negative Valgus: negative    Other   Erythema: absent  Scars: absent  Sensation: normal  Pulse: present  Swelling: none  Effusion: no effusion present    Comments:  Patellofemoral crepitation            No new imaging  Large joint arthrocentesis: R knee  Universal Protocol:  Consent: Verbal consent obtained    Risks and benefits: risks, benefits and alternatives were discussed  Consent given by: patient  Patient understanding: patient states understanding of the procedure being performed    Supporting Documentation  Indications: pain   Procedure Details  Location: knee - R knee  Preparation: Patient was prepped and draped in the usual sterile fashion  Needle size: 22 G  Approach: anterolateral  Medications administered: 30 mg sodium hyaluronate 30 mg/2 mL    Patient tolerance: patient tolerated the procedure well with no immediate complications  Dressing:  Sterile dressing applied

## 2022-08-04 ENCOUNTER — TELEPHONE (OUTPATIENT)
Dept: CARDIOLOGY CLINIC | Facility: CLINIC | Age: 82
End: 2022-08-04

## 2022-08-18 ENCOUNTER — HOSPITAL ENCOUNTER (OUTPATIENT)
Dept: BONE DENSITY | Facility: CLINIC | Age: 82
Discharge: HOME | End: 2022-08-18
Payer: MEDICARE

## 2022-08-18 ENCOUNTER — LAB (OUTPATIENT)
Dept: LAB | Facility: CLINIC | Age: 82
End: 2022-08-18
Payer: MEDICARE

## 2022-08-18 DIAGNOSIS — Z00.00 MEDICARE ANNUAL WELLNESS VISIT, SUBSEQUENT: ICD-10-CM

## 2022-08-18 DIAGNOSIS — I51.7 CARDIOMEGALY: ICD-10-CM

## 2022-08-18 DIAGNOSIS — Z95.810 ICD (IMPLANTABLE CARDIOVERTER-DEFIBRILLATOR) IN PLACE: ICD-10-CM

## 2022-08-18 DIAGNOSIS — I42.9 CARDIOMYOPATHY, UNSPECIFIED TYPE (HCC): ICD-10-CM

## 2022-08-18 DIAGNOSIS — M81.0 OSTEOPOROSIS, UNSPECIFIED OSTEOPOROSIS TYPE, UNSPECIFIED PATHOLOGICAL FRACTURE PRESENCE: ICD-10-CM

## 2022-08-18 DIAGNOSIS — I05.9 MITRAL VALVE DISEASE: ICD-10-CM

## 2022-08-18 DIAGNOSIS — I50.20 SYSTOLIC CONGESTIVE HEART FAILURE, UNSPECIFIED HF CHRONICITY (HCC): ICD-10-CM

## 2022-08-18 DIAGNOSIS — Z13.6 SCREENING FOR CARDIOVASCULAR CONDITION: ICD-10-CM

## 2022-08-18 DIAGNOSIS — E03.9 HYPOTHYROIDISM, UNSPECIFIED TYPE: ICD-10-CM

## 2022-08-18 LAB
ALBUMIN SERPL BCP-MCNC: 3.4 G/DL (ref 3.5–5)
ALP SERPL-CCNC: 71 U/L (ref 46–116)
ALT SERPL W P-5'-P-CCNC: 25 U/L (ref 12–78)
ANION GAP SERPL CALCULATED.3IONS-SCNC: 3 MMOL/L (ref 4–13)
AST SERPL W P-5'-P-CCNC: 27 U/L (ref 5–45)
BASOPHILS # BLD AUTO: 0.13 THOUSANDS/ΜL (ref 0–0.1)
BASOPHILS NFR BLD AUTO: 1 % (ref 0–1)
BILIRUB DIRECT SERPL-MCNC: 0.13 MG/DL (ref 0–0.2)
BILIRUB SERPL-MCNC: 0.43 MG/DL (ref 0.2–1)
BUN SERPL-MCNC: 21 MG/DL (ref 5–25)
CALCIUM ALBUM COR SERPL-MCNC: 9.6 MG/DL (ref 8.3–10.1)
CALCIUM SERPL-MCNC: 9.1 MG/DL (ref 8.3–10.1)
CHLORIDE SERPL-SCNC: 104 MMOL/L (ref 96–108)
CHOLEST SERPL-MCNC: 150 MG/DL
CO2 SERPL-SCNC: 29 MMOL/L (ref 21–32)
CREAT SERPL-MCNC: 1.06 MG/DL (ref 0.6–1.3)
EOSINOPHIL # BLD AUTO: 0.26 THOUSAND/ΜL (ref 0–0.61)
EOSINOPHIL NFR BLD AUTO: 3 % (ref 0–6)
ERYTHROCYTE [DISTWIDTH] IN BLOOD BY AUTOMATED COUNT: 13.3 % (ref 11.6–15.1)
GFR SERPL CREATININE-BSD FRML MDRD: 49 ML/MIN/1.73SQ M
GLUCOSE P FAST SERPL-MCNC: 89 MG/DL (ref 65–99)
HCT VFR BLD AUTO: 39.3 % (ref 34.8–46.1)
HDLC SERPL-MCNC: 52 MG/DL
HGB BLD-MCNC: 12.1 G/DL (ref 11.5–15.4)
IMM GRANULOCYTES # BLD AUTO: 0.03 THOUSAND/UL (ref 0–0.2)
IMM GRANULOCYTES NFR BLD AUTO: 0 % (ref 0–2)
LDLC SERPL CALC-MCNC: 81 MG/DL (ref 0–100)
LYMPHOCYTES # BLD AUTO: 1.81 THOUSANDS/ΜL (ref 0.6–4.47)
LYMPHOCYTES NFR BLD AUTO: 18 % (ref 14–44)
MCH RBC QN AUTO: 31.1 PG (ref 26.8–34.3)
MCHC RBC AUTO-ENTMCNC: 30.8 G/DL (ref 31.4–37.4)
MCV RBC AUTO: 101 FL (ref 82–98)
MONOCYTES # BLD AUTO: 0.71 THOUSAND/ΜL (ref 0.17–1.22)
MONOCYTES NFR BLD AUTO: 7 % (ref 4–12)
NEUTROPHILS # BLD AUTO: 7.26 THOUSANDS/ΜL (ref 1.85–7.62)
NEUTS SEG NFR BLD AUTO: 71 % (ref 43–75)
NONHDLC SERPL-MCNC: 98 MG/DL
NRBC BLD AUTO-RTO: 0 /100 WBCS
PLATELET # BLD AUTO: 165 THOUSANDS/UL (ref 149–390)
PMV BLD AUTO: 12.5 FL (ref 8.9–12.7)
POTASSIUM SERPL-SCNC: 4.2 MMOL/L (ref 3.5–5.3)
PROT SERPL-MCNC: 7.5 G/DL (ref 6.4–8.4)
RBC # BLD AUTO: 3.89 MILLION/UL (ref 3.81–5.12)
SODIUM SERPL-SCNC: 136 MMOL/L (ref 135–147)
TRIGL SERPL-MCNC: 85 MG/DL
TSH SERPL DL<=0.05 MIU/L-ACNC: 1.23 UIU/ML (ref 0.45–4.5)
WBC # BLD AUTO: 10.2 THOUSAND/UL (ref 4.31–10.16)

## 2022-08-18 PROCEDURE — 77080 DXA BONE DENSITY AXIAL: CPT

## 2022-08-18 PROCEDURE — 36415 COLL VENOUS BLD VENIPUNCTURE: CPT

## 2022-08-18 PROCEDURE — 80061 LIPID PANEL: CPT

## 2022-08-18 PROCEDURE — 85025 COMPLETE CBC W/AUTO DIFF WBC: CPT

## 2022-08-18 PROCEDURE — 82248 BILIRUBIN DIRECT: CPT

## 2022-08-18 PROCEDURE — 84443 ASSAY THYROID STIM HORMONE: CPT

## 2022-08-18 PROCEDURE — 80053 COMPREHEN METABOLIC PANEL: CPT

## 2022-08-22 ENCOUNTER — TELEPHONE (OUTPATIENT)
Dept: FAMILY MEDICINE CLINIC | Facility: CLINIC | Age: 82
End: 2022-08-22

## 2022-08-22 NOTE — TELEPHONE ENCOUNTER
Called and left patient another voicemail to call me back to discuss Dexa scan results  Pending return call

## 2022-08-23 ENCOUNTER — TELEPHONE (OUTPATIENT)
Dept: FAMILY MEDICINE CLINIC | Facility: CLINIC | Age: 82
End: 2022-08-23

## 2022-08-23 DIAGNOSIS — M81.0 OSTEOPOROSIS WITHOUT CURRENT PATHOLOGICAL FRACTURE, UNSPECIFIED OSTEOPOROSIS TYPE: Primary | ICD-10-CM

## 2022-08-23 NOTE — TELEPHONE ENCOUNTER
Patient returning your call in regards to her DEXA results  She states that she'll be home until 1:00

## 2022-08-23 NOTE — TELEPHONE ENCOUNTER
Left a message on patients cell phone to return my call to discuss results of bone density scan  Pending a return call

## 2022-08-23 NOTE — TELEPHONE ENCOUNTER
Reviewed Dexa scan results with patient and patient advised to make appointment with Rheumatology for treatment of osteoporosis  Patient also counseled on taking a Calcium supplement in the mean time  Patient provided Central Scheduling phone number and instructed to call for appointment

## 2022-08-30 ENCOUNTER — REMOTE DEVICE CLINIC VISIT (OUTPATIENT)
Dept: CARDIOLOGY CLINIC | Facility: CLINIC | Age: 82
End: 2022-08-30
Payer: MEDICARE

## 2022-08-30 DIAGNOSIS — Z95.810 PRESENCE OF IMPLANTABLE CARDIOVERTER-DEFIBRILLATOR (ICD): Primary | ICD-10-CM

## 2022-08-30 PROCEDURE — 93296 REM INTERROG EVL PM/IDS: CPT | Performed by: INTERNAL MEDICINE

## 2022-08-30 PROCEDURE — 93295 DEV INTERROG REMOTE 1/2/MLT: CPT | Performed by: INTERNAL MEDICINE

## 2022-08-30 NOTE — PROGRESS NOTES
LATITUDE TRANSMISSION:  BATTERY VOLTAGE ADEQUATE (5 YR )   AP 90% RVP/%    ALL AVAILABLE LEAD PARAMETERS WITHIN NORMAL LIMITS   NO SIGNIFICANT HIGH RATE EPISODES   NORMAL DEVICE FUNCTION  Melly Jhaveri

## 2022-09-08 ENCOUNTER — RA CDI HCC (OUTPATIENT)
Dept: OTHER | Facility: HOSPITAL | Age: 82
End: 2022-09-08

## 2022-09-08 NOTE — PROGRESS NOTES
I13 [id number]  [facility]  coding opportunities          Chart Reviewed number of suggestions sent to Provider: 1     Patients Insurance     Medicare Insurance: [employer]

## 2022-09-19 ENCOUNTER — OFFICE VISIT (OUTPATIENT)
Dept: FAMILY MEDICINE CLINIC | Facility: CLINIC | Age: 82
End: 2022-09-19
Payer: MEDICARE

## 2022-09-19 VITALS
TEMPERATURE: 99.1 F | HEART RATE: 68 BPM | DIASTOLIC BLOOD PRESSURE: 80 MMHG | RESPIRATION RATE: 16 BRPM | WEIGHT: 124 LBS | OXYGEN SATURATION: 95 % | BODY MASS INDEX: 20.66 KG/M2 | SYSTOLIC BLOOD PRESSURE: 140 MMHG | HEIGHT: 65 IN

## 2022-09-19 DIAGNOSIS — N18.31 STAGE 3A CHRONIC KIDNEY DISEASE (HCC): Primary | ICD-10-CM

## 2022-09-19 DIAGNOSIS — Z23 NEED FOR VACCINATION: ICD-10-CM

## 2022-09-19 DIAGNOSIS — I42.9 CARDIOMYOPATHY, UNSPECIFIED TYPE (HCC): ICD-10-CM

## 2022-09-19 DIAGNOSIS — I50.20 SYSTOLIC CONGESTIVE HEART FAILURE, UNSPECIFIED HF CHRONICITY (HCC): ICD-10-CM

## 2022-09-19 DIAGNOSIS — M81.0 AGE-RELATED OSTEOPOROSIS WITHOUT CURRENT PATHOLOGICAL FRACTURE: ICD-10-CM

## 2022-09-19 DIAGNOSIS — I10 PRIMARY HYPERTENSION: ICD-10-CM

## 2022-09-19 PROBLEM — I49.3 PVC'S (PREMATURE VENTRICULAR CONTRACTIONS): Status: ACTIVE | Noted: 2019-01-08

## 2022-09-19 PROBLEM — I07.1 TRICUSPID REGURGITATION: Status: ACTIVE | Noted: 2022-09-19

## 2022-09-19 PROBLEM — H60.502 ACUTE OTITIS EXTERNA OF LEFT EAR: Status: RESOLVED | Noted: 2022-04-12 | Resolved: 2022-09-19

## 2022-09-19 PROBLEM — H61.22 IMPACTED CERUMEN OF LEFT EAR: Status: RESOLVED | Noted: 2022-04-12 | Resolved: 2022-09-19

## 2022-09-19 PROCEDURE — G0008 ADMIN INFLUENZA VIRUS VAC: HCPCS

## 2022-09-19 PROCEDURE — 90662 IIV NO PRSV INCREASED AG IM: CPT

## 2022-09-19 PROCEDURE — 99214 OFFICE O/P EST MOD 30 MIN: CPT | Performed by: FAMILY MEDICINE

## 2022-09-19 NOTE — PATIENT INSTRUCTIONS
Chronic Kidney Disease Diet   AMBULATORY CARE:   A chronic kidney disease (CKD) diet  limits protein, phosphorus, sodium, and potassium  Liquids may also need to be limited in later stages of CKD  This diet can help slow down the rate of damage to your kidneys  Your diet may change over time as your health condition changes  You may also need to make other diet changes if you have other health problems, such as diabetes  Call your nephrologist or dietitian if:   You are gaining or losing weight very quickly  You have shortness of breath  You have nausea and are vomiting  You feel very weak and tired  You are having trouble following the CKD diet  Diet changes: There are 5 stages of CKD  The diet changes you need to make are based on your stage of kidney disease  Work with your dietitian or healthcare provider to plan meals that are right for you  You may need any of the following:  Limit protein  in all stages of kidney disease  Limit the portion sizes of protein you eat to limit the amount of work your kidneys have to do  Foods that are high in protein are meat, poultry (chicken and turkey), fish, eggs, and dairy (milk, cheese, yogurt)  Your healthcare provider will tell you how much protein to eat each day  Limit sodium  as directed  You may need to limit sodium to less than 2,300 milligrams (mg) each day  Ask your dietitian or healthcare provider how much sodium you can have each day  The amount depends on your stage of kidney disease  Table salt, canned foods, soups, salted snacks, and processed meats, like deli meats and sausage, are high in sodium  Limit the amount of phosphorus  you eat  Your kidneys cannot get rid of extra phosphorus that builds up in your blood  This may cause your bones to lose calcium and weaken  Foods that are high in phosphorus are dairy products, beans, peas, nuts, and whole grains  Phosphorus is also found in cocoa, beer, and cola drinks   Your healthcare provider will tell you how much phosphorus you can have each day  Limit potassium  if your potassium blood levels are too high  Your dietitian or healthcare provider will tell you if you need to limit potassium  Potassium is found in fruits and vegetables  Limit liquids  as directed  Your healthcare provider may recommend that you limit liquids in stages 4 and 5 of kidney disease  If your body retains fluids, you will have swelling and fluid may build up in your lungs  This can cause other health problems, such as shortness of breath  Foods you may include: Your dietitian will tell you how many servings you can have from each of the food groups below  The approximate amount of these nutrients is listed next to each food group  Read the food label to find the exact amount  Bread, cereal, and grains: These foods contain about 80 calories, 2 grams (g) of protein, 150 mg of sodium, 50 mg of potassium, and 30 mg of phosphorus  1 slice (1 ounce) of bread ([place], [place], raisin, light rye, or sourdough white), small dinner roll, or 6-inch tortilla    ½ of a hamburger bun, hot dog bun, or English muffin or ¼ of a bagel    1 cup of unsweetened cereal or ½ cup of cooked cereal, such as cream of wheat    ? cup of cooked pasta (noodles, macaroni, or spaghetti) or rice    4 (2-inch) unsalted crackers or 3 squares of raúl crackers    3 cups of air-popped, unsalted popcorn    ¾ ounce of unsalted pretzels    Vegetables:  A serving of these foods contains about 30 calories, 2 g of protein, 50 mg of sodium, and 50 mg of phosphorus      Low potassium (less than 150 mg):      ½ cup cooked green beans, cabbage, cauliflower, beets, or corn    1 cup raw cucumber, endive, alfalfa sprouts, cabbage, cauliflower, or watercress    1 cup of all types of lettuce    ¼ cup cooked or ½ cup raw mushrooms or onions    1 cup cooked eggplant    Medium potassium (150 to 250 mg):      1 cup raw broccoli, celery, or zucchini    ½ cup cooked asparagus, broccoli, celery, green peas, summer squash, zucchini, or peppers    1 cup cooked kale or turnips    Fruits:  A serving of these foods contains about 60 calories, 0 g protein, 0 mg sodium, and 150 mg of phosphorus  Each serving is ½ cup, unless another amount is given  Low potassium (less than 150 mg): Apple juice, applesauce, or 1 small apple    Blueberries    Cranberries or cranberry juice cocktail    Fresh or canned pears (light syrup or packed in water)    Grapes or grape juice    Canned peaches (light syrup or packed in water)    Pineapple or strawberries    1 tangerine    Watermelon    Medium potassium (150 to 250 mg):      Fresh peaches or pears    Cherries    Cantaloupe, ernestine, or papaya    Grapefruit or grapefruit juice    Meat, poultry, and fish: These foods have about 75 calories, 7 g of protein, an average of 65 mg of sodium, 115 mg of potassium, and 70 mg of phosphorus  Do not use salt to prepare these foods  1 ounce of cooked beef, pork, or poultry    1 ounce of any fresh or frozen fish, lobster, shrimp, crab, clams, tuna, unsalted canned salmon, or unsalted sardines    Other protein foods: These foods have about 90 calories, 7 g of protein, an average of 100 mg of sodium, 100 mg of potassium, and 120 mg of phosphorus  1 large whole egg or ¼ cup of low-cholesterol egg substitute    1 ounce of cheese    ¼ cup of cottage cheese or tofu    1 ounce of unsalted nuts or 2 tablespoons of peanut butter    Fats: These foods have very little protein and about 45 calories, 55 mg of sodium, 10 mg of potassium, and 5 mg of phosphorus  Include healthy fats, such as unsaturated fats, which are listed below      1 teaspoon margarine or mayonnaise    1 teaspoon oil (safflower, sunflower, corn, soybean, olive, peanut, canola)    1 tablespoon oil-based salad dressing (such as [place]) or 2 tablespoons mayonnaise-based salad dressing (such as ranch)    Foods to limit or avoid:   Starches: The following foods have higher amounts of sodium, potassium, or phosphorus  Biscuits, muffins, pancakes, and waffles    Cake and cornbread from boxed mixes    Oatmeal and whole-wheat cereals    Salted pretzel sticks or rings and sandwich cookies    Meat and protein foods: The following are high in sodium and phosphorus  Deli-style meat, such as roast beef, ham, and turkey    Canned salmon and sardines    Processed cheese, such as American cheese and cheese spreads    Smoked or cured meat, such as corned beef, hernandez, ham, hot dogs, and sausage    Legumes: These foods have about 90 calories, 6 g of protein, less than 10 mg of sodium, 250 mg of potassium, and 100 mg of phosphorus  ? cup of black beans, red kidney beans, black-eye peas, garbanzos, and lentils    ¼ cup of green or mature soybeans    Dairy: The following foods have about 8 g of protein, an average of 120 mg of sodium, 350 mg of potassium, and 220 mg of phosphorus  1 cup of milk (fat-free, low-fat, whole, buttermilk, or chocolate milk)    1 cup of low-fat plain or sugar-free yogurt or ice cream    ½ cup of pudding or custard    Nondairy milk substitutes: These foods have 75 calories, 1 gram of protein, and an average of 40 mg of sodium, 60 mg of potassium, and 60 mg of phosphorus  A serving is ½ cup of almond, rice, or soy milk, or nondairy creamer  Vegetables: The following vegetables are high in potassium  Each serving has more than 250 mg of potassium  A serving is ½ cup, unless another amount is given  Artichoke or ¼ of a medium avocado    [place] sprouts, beets, chard, filippo or mustard greens    Potatoes, sweet potatoes, pumpkin, and yams    ¾ cup of okra    Raw tomatoes and low-sodium tomato juice, or tomato sauce    Winter squash, cooked asparagus, and cooked spinach    Fruit:  The following fruits are high in potassium  Each serving has more than 250 mg of potassium      3 fresh apricots    1 small nectarine (2 inches across)    1 small orange and ½ cup of orange juice    ¼ cup of dates    ? of a small honeydew melon    1 six-inch banana    ½ cup of prune juice or prunes and kiwifruit    Fats:  Limit unhealthy fats, such as saturated fats, which are listed below  Butter, lard, cream cheese, whipped cream, and sour cream    Powdered coffee creamer    Other: The following foods are high in sodium  Frozen dinners, soups, and fast foods, such as hamburgers and pizza (see the food label for serving sizes)    Table salt and seasoned salts, such as onion or garlic salt    Barbecue sauce, ketchup, mustard, soy sauce, steak sauce, and teriyaki sauce    Follow up with your nephrologist or dietitian as directed:  Write down your questions so you remember to ask them during your visits  © Copyright [employer] 2022 Information is for End User's use only and may not be sold, redistributed or otherwise used for commercial purposes  All illustrations and images included in CareNotes® are the copyrighted property of [employer] Inc  or [facility] Timmy Hussein   The above information is an  only  It is not intended as medical advice for individual conditions or treatments  Talk to your doctor, nurse or pharmacist before following any medical regimen to see if it is safe and effective for you

## 2022-09-19 NOTE — PROGRESS NOTES
Assessment/Plan:  Problem List Items Addressed This Visit        Cardiovascular and Mediastinum    Cardiomyopathy [facility])     The patient is stable on her current medication  She will continue to follow-up with Cardiology as scheduled  CHF (congestive heart failure) ([facility])     Wt Readings from Last 3 Encounters:   09/19/22 56 2 kg (124 lb)   07/28/22 58 1 kg (128 lb)   07/20/22 58 2 kg (128 lb 3 2 oz)     The patient is stable on her current medication  She will continue to follow-up with Cardiology as scheduled             Hypertension     The patient's blood pressure stable on her current medication  We have made no changes today  She will continue follow-up with Cardiology as scheduled  Relevant Orders    US kidney and bladder    Comprehensive metabolic panel    Protein / creatinine ratio, urine    Microalbumin / creatinine urine ratio    Urinalysis with microscopic    Vitamin D 25 hydroxy    Phosphorus    PTH, intact    Calcium, ionized       Musculoskeletal and Integument    Osteoporosis     The patient will follow-up with her Rheumatology as ordered  She will schedule appointment in the near future  Relevant Orders    Ambulatory Referral to Rheumatology    Vitamin D 25 hydroxy    Phosphorus    PTH, intact    Calcium, ionized       Genitourinary    Stage 3a chronic kidney disease ([address] ) - Primary     Lab Results   Component Value Date    EGFR 49 08/18/2022    EGFR 54 09/13/2018    EGFR 46 04/13/2018    CREATININE 1 06 08/18/2022    CREATININE 1 00 09/13/2018    CREATININE 1 14 04/13/2018   The patient has a persistently low GFR that is consistent with chronic kidney disease  We will send her for the additional testing as ordered  She was instructed to avoid the use NSAIDs and to decrease her salt intake  She will avoid all nephrotoxic agents  We will see her back in the office as scheduled to review her labs and further instruction           Relevant Orders    US kidney and bladder Comprehensive metabolic panel    Protein / creatinine ratio, urine    Microalbumin / creatinine urine ratio    Urinalysis with microscopic    Vitamin D 25 hydroxy    Phosphorus    PTH, intact    Calcium, ionized      Other Visit Diagnoses     Need for vaccination        Relevant Orders    influenza vaccine, high-dose, PF 0 7 mL (FLUZONE HIGH-DOSE) (Completed)          Return in about 1 month (around 10/19/2022) for Recheck  I spent 20 minutes during the visit reviewing the history from the patient, performing the examination, discussing the findings with the patient, providing counseling and education, and making a plan  I spent 15 minutes ordering referrals and testing and documenting  Subjective:   Chief Complaint   Patient presents with    Follow-up     Check up hypertension--discuss A Rheumatologist close to home        Patient ID: Genaro Cantor is a 80 y o  female presents today for a routine checkup  Genaro Cantor is a 80 y o  female who presents today for follow-up of her hypertension and osteoporosis  She is feeling well overall  She is tolerating all of her medications  She is seeing Dr Kristen Dunne for her Cardiomyopathy and CHF  She is seeing increased shortness of breath with walking over the last 3 weeks  There is no chest pressure  There is a bloated feeling on the right, there is no pain  She saw Dr Kristen Dunne recently- she has tests ordered  There is PND for a long time  She has allergic rhinitis  There are increased allergy symptoms and she feels nauseated at times  She has been taking Claritin  There is a dry hacking cough  She is seeing Dr Kristen Dunne in December 2022  The patient denies any chest pain, shortness of breath, or palpitations  There is no edema  There are no headaches or visual changes  There is no lightheadedness, dizziness, or fainting spells  The patient currently denies any nausea, vomiting, or GERD symptoms    she has normal bowel movements and normal urine output  she has a normal appetite  Hypertension  This is a chronic problem  The current episode started more than 1 year ago  The problem is unchanged  The problem is controlled  Associated symptoms include shortness of breath  Pertinent negatives include no anxiety, blurred vision, chest pain, headaches, malaise/fatigue, neck pain, orthopnea, palpitations, peripheral edema, PND or sweats  The current treatment provides significant improvement  There are no compliance problems        The following portions of the patient's history were reviewed and updated as appropriate: allergies, current medications, past family history, past medical history, past social history, past surgical history and problem list   Patient Active Problem List   Diagnosis    Allergic rhinitis    [facility]    [facility]) [facility]    Cough, persistent    GERD without esophagitis    Hypertension    Osteoporosis    TMJ syndrome    Positive 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 disease    AICD (automatic cardioverter/defibrillator) present    Other specified glaucoma    Primary osteoarthritis of right knee    Environmental and seasonal allergies    Chronic pain of left knee    Primary osteoarthritis of left knee    Leg length discrepancy    Tricuspid regurgitation    PVC's (premature ventricular contractions)    Stage 3a chronic kidney disease ([facility] )     Past Medical History:   Diagnosis Date    Acute otitis externa of left ear 4/12/2022    Cardiomyopathy ([facility]     Chronic diarrhea of unknown origin     resolved 12/22/2015    Contact dermatitis     last assessed 04/19/2013    Esophageal reflux     resolved 56/22/2574    Helicobacter pylori gastrointestinal tract infection 10/25/2018    Hyperlipidemia     resolved 12/22/2015    Muscle weakness (generalized)     resolved 12/22/2015    Pacemaker     Reactive airway disease     resolved 12/22/2015     Past Surgical History:   Procedure Laterality Date    LEG SURGERY Right     OTHER SURGICAL HISTORY      open treatment of weight bearing distal tibial fracture     No Known Allergies  Family History   Problem Relation Age of Onset    Heart disease Father     Diabetes Daughter     Mental illness Daughter         disorder     Social History     Socioeconomic History    Marital status:      Spouse name: Not on file    Number of children: Not on file    Years of education: Not on file    Highest education level: Not on file   Occupational History    Occupation: retired   Tobacco Use    Smoking status: Never Smoker    Smokeless tobacco: Never Used   Vaping Use    Vaping Use: Never used   Substance and Sexual Activity    Alcohol use: No     Comment: denies alcohol use causing problems    Drug use: No    Sexual activity: Not on file   Other Topics Concern    Not on file   Social History Narrative      Social Determinants of Health     Financial Resource Strain: Not on file   Food Insecurity: Not on file   Transportation Needs: Not on file   Physical Activity: Insufficiently Active    Days of Exercise per Week: 2 days    Minutes of Exercise per Session: 30 min   Stress: No Stress Concern Present    Feeling of Stress :  Only a little   Social Connections: Not on file   Intimate Partner Violence: Not At Risk    Fear of Current or Ex-Partner: No    Emotionally Abused: No    Physically Abused: No    Sexually Abused: No   Housing Stability: Not on file     Current Outpatient Medications on File Prior to Visit   Medication Sig Dispense Refill    amiodarone 200 mg tablet Take 0 5 tablets by mouth daily      aspirin 81 MG tablet Take 1 tablet by mouth daily      brimonidine tartrate 0 2 % ophthalmic solution 1 drop 3 (three) times a day      Cholecalciferol 50 MCG (2000 UT) CAPS Take 1 capsule by mouth daily      fluticasone (FLONASE) 50 mcg/act nasal spray 1 spray into each nostril daily 9 9 mL 1    furosemide (LASIX) 40 mg tablet Take 40 mg by mouth daily        latanoprost (XALATAN) 0 005 % ophthalmic solution       loratadine (CLARITIN) 10 mg tablet Take 1 tablet (10 mg total) by mouth daily 30 tablet 3    losartan (COZAAR) 25 mg tablet Take 1 tablet by mouth daily      metoprolol succinate (TOPROL-XL) 50 mg 24 hr tablet Take 1 tablet by mouth daily      Multiple Vitamin (DAILY VALUE MULTIVITAMIN) TABS Take 1 tablet by mouth daily       No current facility-administered medications on file prior to visit  Review of Systems   Constitutional: Negative  Negative for malaise/fatigue  HENT: Negative  Eyes: Negative  Negative for blurred vision  Respiratory: Positive for shortness of breath  Cardiovascular: Negative  Negative for chest pain, palpitations, orthopnea and PND  Gastrointestinal: Negative  Endocrine: Negative  Genitourinary: Negative  Musculoskeletal: Negative  Negative for neck pain  Skin: Negative  Allergic/Immunologic: Negative  Neurological: Negative  Negative for headaches  Hematological: Negative  Psychiatric/Behavioral: Negative  Objective:  Vitals:    09/19/22 1119 09/19/22 1212   BP: 140/86 140/80   BP Location: Left arm    Patient Position: Sitting    Cuff Size: Large    Pulse: 67 68   Resp: 16    Temp: 99 1 °F (37 3 °C)    TempSrc: Tympanic    SpO2: 95%    Weight: 56 2 kg (124 lb)    Height: 5' 4 5" (1 638 m)      Body mass index is 20 96 kg/m²  Physical Exam  Constitutional:       Appearance: She is well-developed  HENT:      Head: Normocephalic and atraumatic  Mouth/Throat:      Pharynx: No oropharyngeal exudate  Eyes:      Conjunctiva/sclera: Conjunctivae normal       Pupils: Pupils are equal, round, and reactive to light  Neck:      Thyroid: No thyromegaly  Vascular: No JVD  Trachea: No tracheal deviation  Cardiovascular:      Rate and Rhythm: Normal rate and regular rhythm  Heart sounds: Normal heart sounds   No murmur heard  No friction rub  No gallop  Pulmonary:      Effort: Pulmonary effort is normal  No respiratory distress  Breath sounds: Normal breath sounds  No stridor  No wheezing or rales  Chest:      Chest wall: No tenderness  Abdominal:      General: Bowel sounds are normal  There is no distension  Palpations: Abdomen is soft  There is no mass  Tenderness: There is no abdominal tenderness  There is no guarding or rebound  Musculoskeletal:         General: No tenderness or deformity  Normal range of motion  Cervical back: Normal range of motion  Lymphadenopathy:      Cervical: No cervical adenopathy  Skin:     General: Skin is warm and dry  Neurological:      Mental Status: She is alert and oriented to person, place, and time  Cranial Nerves: No cranial nerve deficit  Motor: No abnormal muscle tone  Coordination: Coordination normal       Deep Tendon Reflexes: Reflexes are normal and symmetric  Reflexes normal              Depression Screening and Follow-up Plan: Patient was screened for depression during today's encounter  They screened negative with a PHQ-2 score of 0  Falls Plan of Care: balance, strength, and gait training instructions were provided

## 2022-09-20 PROBLEM — A04.8 HELICOBACTER PYLORI GASTROINTESTINAL TRACT INFECTION: Status: RESOLVED | Noted: 2018-10-25 | Resolved: 2022-09-20

## 2022-09-20 NOTE — ASSESSMENT & PLAN NOTE
The patient's blood pressure stable on her current medication  We have made no changes today  She will continue follow-up with Cardiology as scheduled

## 2022-09-20 NOTE — ASSESSMENT & PLAN NOTE
Wt Readings from Last 3 Encounters:   09/19/22 56 2 kg (124 lb)   07/28/22 58 1 kg (128 lb)   07/20/22 58 2 kg (128 lb 3 2 oz)     The patient is stable on her current medication    She will continue to follow-up with Cardiology as scheduled

## 2022-09-20 NOTE — ASSESSMENT & PLAN NOTE
The patient is stable on her current medication  She will continue to follow-up with Cardiology as scheduled

## 2022-09-20 NOTE — ASSESSMENT & PLAN NOTE
Lab Results   Component Value Date    EGFR 49 08/18/2022    EGFR 54 09/13/2018    EGFR 46 04/13/2018    CREATININE 1 06 08/18/2022    CREATININE 1 00 09/13/2018    CREATININE 1 14 04/13/2018   The patient has a persistently low GFR that is consistent with chronic kidney disease  We will send her for the additional testing as ordered  She was instructed to avoid the use NSAIDs and to decrease her salt intake  She will avoid all nephrotoxic agents  We will see her back in the office as scheduled to review her labs and further instruction

## 2022-09-20 NOTE — ASSESSMENT & PLAN NOTE
The patient will follow-up with her Rheumatology as ordered  She will schedule appointment in the near future

## 2022-09-29 ENCOUNTER — HOSPITAL ENCOUNTER (OUTPATIENT)
Dept: ULTRASOUND IMAGING | Facility: CLINIC | Age: 82
Discharge: HOME | End: 2022-09-29
Payer: MEDICARE

## 2022-09-29 ENCOUNTER — APPOINTMENT (OUTPATIENT)
Dept: LAB | Facility: CLINIC | Age: 82
End: 2022-09-29
Payer: MEDICARE

## 2022-09-29 DIAGNOSIS — I10 PRIMARY HYPERTENSION: ICD-10-CM

## 2022-09-29 DIAGNOSIS — N18.31 STAGE 3A CHRONIC KIDNEY DISEASE (HCC): ICD-10-CM

## 2022-09-29 DIAGNOSIS — M81.0 AGE-RELATED OSTEOPOROSIS WITHOUT CURRENT PATHOLOGICAL FRACTURE: ICD-10-CM

## 2022-09-29 LAB
25(OH)D3 SERPL-MCNC: 24.5 NG/ML (ref 30–100)
ALBUMIN SERPL BCP-MCNC: 3.4 G/DL (ref 3.5–5)
ALP SERPL-CCNC: 64 U/L (ref 46–116)
ALT SERPL W P-5'-P-CCNC: 19 U/L (ref 12–78)
ANION GAP SERPL CALCULATED.3IONS-SCNC: 6 MMOL/L (ref 4–13)
AST SERPL W P-5'-P-CCNC: 22 U/L (ref 5–45)
BACTERIA UR QL AUTO: ABNORMAL /HPF
BILIRUB SERPL-MCNC: 0.42 MG/DL (ref 0.2–1)
BILIRUB UR QL STRIP: NEGATIVE
BUN SERPL-MCNC: 26 MG/DL (ref 5–25)
CA-I BLD-SCNC: 1.22 MMOL/L (ref 1.12–1.32)
CALCIUM ALBUM COR SERPL-MCNC: 10 MG/DL (ref 8.3–10.1)
CALCIUM SERPL-MCNC: 9.5 MG/DL (ref 8.3–10.1)
CHLORIDE SERPL-SCNC: 104 MMOL/L (ref 96–108)
CLARITY UR: CLEAR
CO2 SERPL-SCNC: 26 MMOL/L (ref 21–32)
COLOR UR: ABNORMAL
CREAT SERPL-MCNC: 1.23 MG/DL (ref 0.6–1.3)
CREAT UR-MCNC: 41.5 MG/DL
CREAT UR-MCNC: 42.4 MG/DL
GFR SERPL CREATININE-BSD FRML MDRD: 40 ML/MIN/1.73SQ M
GLUCOSE P FAST SERPL-MCNC: 105 MG/DL (ref 65–99)
GLUCOSE UR STRIP-MCNC: NEGATIVE MG/DL
HGB UR QL STRIP.AUTO: NEGATIVE
KETONES UR STRIP-MCNC: NEGATIVE MG/DL
LEUKOCYTE ESTERASE UR QL STRIP: NEGATIVE
MICROALBUMIN UR-MCNC: 7.2 MG/L (ref 0–20)
MICROALBUMIN/CREAT 24H UR: 17 MG/G CREATININE (ref 0–30)
MUCOUS THREADS UR QL AUTO: ABNORMAL
NITRITE UR QL STRIP: POSITIVE
NON-SQ EPI CELLS URNS QL MICRO: ABNORMAL /HPF
PH UR STRIP.AUTO: 6 [PH]
PHOSPHATE SERPL-MCNC: 3.2 MG/DL (ref 2.3–4.1)
POTASSIUM SERPL-SCNC: 4.7 MMOL/L (ref 3.5–5.3)
PROT SERPL-MCNC: 9 G/DL (ref 6.4–8.4)
PROT UR STRIP-MCNC: NEGATIVE MG/DL
PROT UR-MCNC: 7 MG/DL
PROT/CREAT UR: 0.17 MG/G{CREAT} (ref 0–0.1)
PTH-INTACT SERPL-MCNC: 81.4 PG/ML (ref 18.4–80.1)
RBC #/AREA URNS AUTO: ABNORMAL /HPF
SODIUM SERPL-SCNC: 136 MMOL/L (ref 135–147)
SP GR UR STRIP.AUTO: 1.01 (ref 1–1.03)
TRANS CELLS #/AREA URNS HPF: PRESENT /[HPF]
UROBILINOGEN UR STRIP-ACNC: <2 MG/DL
WBC #/AREA URNS AUTO: ABNORMAL /HPF

## 2022-09-29 PROCEDURE — 80053 COMPREHEN METABOLIC PANEL: CPT

## 2022-09-29 PROCEDURE — 36415 COLL VENOUS BLD VENIPUNCTURE: CPT

## 2022-09-29 PROCEDURE — 84156 ASSAY OF PROTEIN URINE: CPT | Performed by: FAMILY MEDICINE

## 2022-09-29 PROCEDURE — 83970 ASSAY OF PARATHORMONE: CPT

## 2022-09-29 PROCEDURE — 82570 ASSAY OF URINE CREATININE: CPT

## 2022-09-29 PROCEDURE — 82043 UR ALBUMIN QUANTITATIVE: CPT

## 2022-09-29 PROCEDURE — 76770 US EXAM ABDO BACK WALL COMP: CPT

## 2022-09-29 PROCEDURE — 82570 ASSAY OF URINE CREATININE: CPT | Performed by: FAMILY MEDICINE

## 2022-09-29 PROCEDURE — 81001 URINALYSIS AUTO W/SCOPE: CPT

## 2022-09-29 PROCEDURE — 82330 ASSAY OF CALCIUM: CPT

## 2022-09-29 PROCEDURE — 82306 VITAMIN D 25 HYDROXY: CPT

## 2022-09-29 PROCEDURE — 84100 ASSAY OF PHOSPHORUS: CPT

## 2022-10-10 ENCOUNTER — TELEPHONE (OUTPATIENT)
Dept: FAMILY MEDICINE CLINIC | Facility: CLINIC | Age: 82
End: 2022-10-10

## 2022-10-10 DIAGNOSIS — N18.31 STAGE 3A CHRONIC KIDNEY DISEASE (HCC): Primary | ICD-10-CM

## 2022-10-10 DIAGNOSIS — E55.9 VITAMIN D DEFICIENCY: ICD-10-CM

## 2022-10-10 NOTE — TELEPHONE ENCOUNTER
I spoke with the patient 10/10/2022 and reviewed the results of her lab work  It demonstrates that her GFR has declined and is consistent with chronic kidney disease  Also her vitamin-D level was low which most likely as result of her chronic kidney disease  She is not taking her vitamin-D supplementation  Her urinalysis was unremarkable so most likely her chronic kidney disease is a result of her age, hypertension, and cardiac issues  I advised her at this point that I would like to refer her to Nephrology for further evaluation and treatment  She should refrain from nephrotoxic agents and should decrease her salt intake  She will schedule the appointment with Nephrology as soon as possible

## 2022-10-10 NOTE — TELEPHONE ENCOUNTER
[facility] had her Labs done on 9/29 and is wondering if you can go over results with her?   Thank you

## 2022-10-11 ENCOUNTER — TELEPHONE (OUTPATIENT)
Dept: FAMILY MEDICINE CLINIC | Facility: CLINIC | Age: 82
End: 2022-10-11

## 2022-10-11 ENCOUNTER — TELEPHONE (OUTPATIENT)
Dept: NEPHROLOGY | Facility: CLINIC | Age: 82
End: 2022-10-11

## 2022-10-11 NOTE — TELEPHONE ENCOUNTER
Left message on machine that per TM patient should take 1 Vitamin D3 pill per day   Instructed pt to return call with questions

## 2022-10-11 NOTE — TELEPHONE ENCOUNTER
cNew Patient Intake Form   Patient Details   Tacos Palomino     1940     [id number]     Insurance Information   Name of [address] [facility]   Does the patient need an insurance referral? no   If patient has Pitney Vikash, please ask if they will be using their Pitney Vikash  Appointment Information   Who is calling to schedule? If not patient, what is callers name? Patient   Referring Provider Dr Yael Grey    Referring Provider Number [phone]    Reason for Appt (Diagnosis) N18 31 (ICD-10-CM) - Stage 3a chronic kidney disease ([facility] [address] )   E55 9 (ICD-10-CM) - Vitamin D deficiency   Is patient aware of why they are being referred? yes   Does Patient have labs done at [place] [address]? If not, where do they go?  yes   Has patient had labs / urine work done? List date of most recent lab / urine work  yes  9/29   Has patient had a BMP & CBC done in the past 2 years? If so, list the date Yes  9/29   Has patient been hospitalized recently? If yes, list name and location of hospital they were in  no   Has patient been seen by a Nephrologist before? If yes, list name, location and phone number no    Has patient been see by another Speciality before (ex  Neurology, urology, cardiology)? If yes, please list name, and speciality  Cardio- Jonny Skiff   Has the patient had imaging done? If so, list the most recent date and type of imaging Yes  UD kid/blad 9/29   Does the patient has a stone analysis report if history of kidney stones? no    Appointment Details   Is there a referral on file?  yes    Appointment Date  1/13   Location [place]   Miscellaneous

## 2022-10-11 NOTE — TELEPHONE ENCOUNTER
Patient called  She got the natures bounty D3 vitamins  She states on the bottle it ways 25 mcg 1,000 iu  Patient would like to know if they should take 1 vitamin or 2 a day      Please advise  Thank you

## 2022-10-14 ENCOUNTER — RA CDI HCC (OUTPATIENT)
Dept: OTHER | Facility: HOSPITAL | Age: 82
End: 2022-10-14

## 2022-10-14 NOTE — PROGRESS NOTES
I13 0  [facility]  coding opportunities          Chart Reviewed number of suggestions sent to Provider: 1     Patients Insurance     Medicare Insurance: [employer]

## 2022-10-20 ENCOUNTER — OFFICE VISIT (OUTPATIENT)
Dept: FAMILY MEDICINE CLINIC | Facility: CLINIC | Age: 82
End: 2022-10-20
Payer: MEDICARE

## 2022-10-20 VITALS
SYSTOLIC BLOOD PRESSURE: 130 MMHG | DIASTOLIC BLOOD PRESSURE: 78 MMHG | TEMPERATURE: 97.9 F | RESPIRATION RATE: 16 BRPM | WEIGHT: 121.6 LBS | BODY MASS INDEX: 20.26 KG/M2 | OXYGEN SATURATION: 99 % | HEIGHT: 65 IN | HEART RATE: 68 BPM

## 2022-10-20 DIAGNOSIS — I10 PRIMARY HYPERTENSION: Primary | ICD-10-CM

## 2022-10-20 DIAGNOSIS — M81.0 AGE-RELATED OSTEOPOROSIS WITHOUT CURRENT PATHOLOGICAL FRACTURE: ICD-10-CM

## 2022-10-20 DIAGNOSIS — N18.31 STAGE 3A CHRONIC KIDNEY DISEASE (HCC): ICD-10-CM

## 2022-10-20 DIAGNOSIS — E55.9 VITAMIN D DEFICIENCY: ICD-10-CM

## 2022-10-20 PROCEDURE — 99214 OFFICE O/P EST MOD 30 MIN: CPT | Performed by: FAMILY MEDICINE

## 2022-10-20 NOTE — ASSESSMENT & PLAN NOTE
I spoke with the patient on the patient's blood pressure stable on her current medication  We have made no changes today  We will send her for the follow-up lab work to be performed in January  We will monitor her calcium and she is encouraged to increase her fluids

## 2022-10-20 NOTE — ASSESSMENT & PLAN NOTE
I strongly encouraged the patient to schedule the follow-up with Rheumatology as previously advised for her osteoporosis  She will schedule at her earliest convenience

## 2022-10-20 NOTE — ASSESSMENT & PLAN NOTE
Lab Results   Component Value Date    EGFR 40 09/29/2022    EGFR 49 08/18/2022    EGFR 54 09/13/2018    CREATININE 1 23 09/29/2022    CREATININE 1 06 08/18/2022    CREATININE 1 00 09/13/2018   The patient will go for the follow-up lab work as ordered  She will follow-up with Nephrology as scheduled  I encouraged her to increase her hydration to improve her calcium  We will see her back as scheduled

## 2022-10-20 NOTE — PROGRESS NOTES
Assessment/Plan:  Problem List Items Addressed This Visit        Cardiovascular and Mediastinum    Hypertension - Primary     I spoke with the patient on the patient's blood pressure stable on her current medication  We have made no changes today  We will send her for the follow-up lab work to be performed in January  We will monitor her calcium and she is encouraged to increase her fluids  Relevant Orders    Comprehensive metabolic panel    PTH, intact    Vitamin D 25 hydroxy    Protein / creatinine ratio, urine    Urinalysis with microscopic       Musculoskeletal and Integument    Osteoporosis     I strongly encouraged the patient to schedule the follow-up with Rheumatology as previously advised for her osteoporosis  She will schedule at her earliest convenience  Genitourinary    Stage 3a chronic kidney disease ([facility] [address] )     Lab Results   Component Value Date    EGFR 40 09/29/2022    EGFR 49 08/18/2022    EGFR 54 09/13/2018    CREATININE 1 23 09/29/2022    CREATININE 1 06 08/18/2022    CREATININE 1 00 09/13/2018   The patient will go for the follow-up lab work as ordered  She will follow-up with Nephrology as scheduled  I encouraged her to increase her hydration to improve her calcium  We will see her back as scheduled  Relevant Orders    Comprehensive metabolic panel    PTH, intact    Vitamin D 25 hydroxy    Protein / creatinine ratio, urine    Urinalysis with microscopic       Other    Vitamin D deficiency     The patient will continue with her vitamin-D  We will repeat her vitamin-D level as ordered  Relevant Orders    Vitamin D 25 hydroxy          Return in about 3 months (around 1/20/2023) for Recheck  I spent 20 minutes during the visit reviewing the history from the patient, performing the examination, discussing the findings with the patient, providing counseling and education, and making a plan   I spent 10 minutes ordering referrals and testing and documenting  Subjective:   Chief Complaint   Patient presents with   • Follow-up     Follow up, review lab work        Patient ID: Inna Gamino is a 80 y o  female presents today for a routine checkup  Inna Gamino is a 80 y o  female who presents today for follow-up of her hypertension and chronic kidney disease  She is scheduled to see Nephrology in January  She is avoiding nephrotoxic agents and limiting her salt  She needs to increase her water intake  She still needs to schedule follow-up with Rheumatology in regards to her osteoporosis  She has to get a new BP cuff at home to check her BP  The patient denies any chest pain, shortness of breath, or palpitations  There is no edema  There are no headaches or visual changes  There is no lightheadedness, dizziness, or fainting spells  She is seeing the cardiologist last week  There is no swelling        The following portions of the patient's history were reviewed and updated as appropriate: allergies, current medications, past family history, past medical history, past social history, past surgical history and problem list   Patient Active Problem List   Diagnosis   • Allergic rhinitis   • Cardiomyopathy ([facility] )   • CHF (congestive heart failure) ([facility] )   • Cough, persistent   • GERD (gastroesophageal reflux disease)   • Hypertension   • Osteoporosis   • TMJ syndrome   • Positive 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 disease   • AICD (automatic cardioverter/defibrillator) present   • Other specified glaucoma   • Primary osteoarthritis of right knee   • Environmental and seasonal allergies   • Chronic pain of left knee   • Primary osteoarthritis of left knee   • Leg length discrepancy   • Tricuspid regurgitation   • PVC's (premature ventricular contractions)   • Stage 3a chronic kidney disease ([facility] [address] )   • Vitamin D deficiency     Past Medical History:   Diagnosis Date   • Acute otitis externa of left ear 4/12/2022   • Cardiomyopathy [facility])    • Chronic diarrhea of unknown origin     resolved 12/22/2015   • Contact dermatitis     last assessed 04/19/2013   • Esophageal reflux     resolved 23/39/9686   • Helicobacter pylori gastrointestinal tract infection 10/25/2018   • Hyperlipidemia     resolved 12/22/2015   • Muscle weakness (generalized)     resolved 12/22/2015   • Pacemaker    • Reactive airway disease     resolved 12/22/2015     Past Surgical History:   Procedure Laterality Date   • LEG SURGERY Right    • OTHER SURGICAL HISTORY      open treatment of weight bearing distal tibial fracture     No Known Allergies  Family History   Problem Relation Age of Onset   • Heart disease Father    • Diabetes Daughter    • Mental illness Daughter         disorder     Social History     Socioeconomic History   • Marital status:      Spouse name: Not on file   • Number of children: Not on file   • Years of education: Not on file   • Highest education level: Not on file   Occupational History   • Occupation: retired   Tobacco Use   • Smoking status: Never Smoker   • Smokeless tobacco: Never Used   Vaping Use   • Vaping Use: Never used   Substance and Sexual Activity   • Alcohol use: No     Comment: denies alcohol use causing problems   • Drug use: No   • Sexual activity: Not on file   Other Topics Concern   • Not on file   Social History Narrative      Social Determinants of Health     Financial Resource Strain: Not on file   Food Insecurity: Not on file   Transportation Needs: Not on file   Physical Activity: Insufficiently Active   • Days of Exercise per Week: 2 days   • Minutes of Exercise per Session: 30 min   Stress: No Stress Concern Present   • Feeling of Stress :  Only a little   Social Connections: Not on file   Intimate Partner Violence: Not At Risk   • Fear of Current or Ex-Partner: No   • Emotionally Abused: No   • Physically Abused: No   • Sexually Abused: No   Housing Stability: Not on file     Current Outpatient Medications on File Prior to Visit   Medication Sig Dispense Refill   • amiodarone 200 mg tablet Take 0 5 tablets by mouth daily     • aspirin 81 MG tablet Take 1 tablet by mouth daily     • brimonidine tartrate 0 2 % ophthalmic solution 1 drop 3 (three) times a day     • Cholecalciferol 50 MCG (2000 UT) CAPS Take 1 capsule by mouth daily     • furosemide (LASIX) 40 mg tablet Take 40 mg by mouth daily       • latanoprost (XALATAN) 0 005 % ophthalmic solution      • loratadine (CLARITIN) 10 mg tablet Take 1 tablet (10 mg total) by mouth daily 30 tablet 3   • losartan (COZAAR) 25 mg tablet Take 1 tablet by mouth daily     • metoprolol succinate (TOPROL-XL) 50 mg 24 hr tablet Take 1 tablet by mouth daily     • Multiple Vitamin (DAILY VALUE MULTIVITAMIN) TABS Take 1 tablet by mouth daily     • fluticasone (FLONASE) 50 mcg/act nasal spray 1 spray into each nostril daily (Patient not taking: Reported on 10/20/2022) 9 9 mL 1     No current facility-administered medications on file prior to visit  Review of Systems   Constitutional: Negative  HENT: Negative  Eyes: Negative  Respiratory: Negative  Cardiovascular: Negative  Gastrointestinal: Negative  Endocrine: Negative  Genitourinary: Negative  Musculoskeletal: Negative  Skin: Negative  Allergic/Immunologic: Negative  Neurological: Negative  Hematological: Negative  Psychiatric/Behavioral: Negative  Objective:  Vitals:    10/20/22 1113 10/20/22 1151   BP: 150/88 130/78   BP Location: Left arm    Patient Position: Sitting    Cuff Size: Standard    Pulse: 67 68   Resp: 16    Temp: 97 9 °F (36 6 °C)    TempSrc: Tympanic    SpO2: 99%    Weight: 55 2 kg (121 lb 9 6 oz)    Height: 5' 4 5" (1 638 m)      Body mass index is 20 55 kg/m²  Physical Exam  Constitutional:       Appearance: She is well-developed  HENT:      Head: Normocephalic and atraumatic  Mouth/Throat:      Pharynx: No oropharyngeal exudate     Eyes:      Conjunctiva/sclera: Conjunctivae normal       Pupils: Pupils are equal, round, and reactive to light  Neck:      Thyroid: No thyromegaly  Vascular: No JVD  Trachea: No tracheal deviation  Cardiovascular:      Rate and Rhythm: Normal rate and regular rhythm  Heart sounds: Normal heart sounds  No murmur heard  No friction rub  No gallop  Pulmonary:      Effort: Pulmonary effort is normal  No respiratory distress  Breath sounds: Normal breath sounds  No stridor  No wheezing or rales  Chest:      Chest wall: No tenderness  Abdominal:      General: Bowel sounds are normal  There is no distension  Palpations: Abdomen is soft  There is no mass  Tenderness: There is no abdominal tenderness  There is no guarding or rebound  Musculoskeletal:         General: No tenderness or deformity  Normal range of motion  Cervical back: Normal range of motion  Lymphadenopathy:      Cervical: No cervical adenopathy  Skin:     General: Skin is warm and dry  Neurological:      Mental Status: She is alert and oriented to person, place, and time  Cranial Nerves: No cranial nerve deficit  Motor: No abnormal muscle tone  Coordination: Coordination normal       Deep Tendon Reflexes: Reflexes are normal and symmetric  Reflexes normal          Appointment on 09/29/2022   Component Date Value   • Sodium 09/29/2022 136    • Potassium 09/29/2022 4 7    • Chloride 09/29/2022 104    • CO2 09/29/2022 26    • ANION GAP 09/29/2022 6    • BUN 09/29/2022 26 (A)   • Creatinine 09/29/2022 1 23    • Glucose, Fasting 09/29/2022 105 (A)   • Calcium 09/29/2022 9 5    • Corrected Calcium 09/29/2022 10 0    • AST 09/29/2022 22    • ALT 09/29/2022 19    • Alkaline Phosphatase 09/29/2022 64    • Total Protein 09/29/2022 9 0 (A)   • Albumin 09/29/2022 3 4 (A)   • Total Bilirubin 09/29/2022 0 42    • eGFR 09/29/2022 40    • Creatinine, Ur 09/29/2022 42 4    • Microalbum  ,U,Random 09/29/2022 7 2    • Microalb Creat Ratio 09/29/2022 17    • Color, UA 09/29/2022 Light Yellow    • Clarity, UA 09/29/2022 Clear    • Specific Gravity, UA 09/29/2022 1 011    • pH, UA 09/29/2022 6 0    • Leukocytes, UA 09/29/2022 Negative    • Nitrite, UA 09/29/2022 Positive (A)   • Protein, UA 09/29/2022 Negative    • Glucose, UA 09/29/2022 Negative    • Ketones, UA 09/29/2022 Negative    • Urobilinogen, UA 09/29/2022 <2 0    • Bilirubin, UA 09/29/2022 Negative    • Occult Blood, UA 09/29/2022 Negative    • RBC, UA 09/29/2022 1-2    • WBC, UA 09/29/2022 None Seen    • Epithelial Cells 09/29/2022 Occasional    • Bacteria, UA 09/29/2022 None Seen    • MUCUS THREADS 09/29/2022 Occasional (A)   • Transitional Epithelial * 09/29/2022 Present    • Vit D, 25-Hydroxy 09/29/2022 24 5 (A)   • Phosphorus 09/29/2022 3 2    • PTH 09/29/2022 81 4 (A)   • Calcium, Ionized 09/29/2022 1 22    Office Visit on 09/19/2022   Component Date Value   • Creatinine, Ur 09/29/2022 41 5    • Protein Urine Random 09/29/2022 7    • Prot/Creat Ratio, Ur 09/29/2022 0 17 (A)

## 2022-11-01 ENCOUNTER — OFFICE VISIT (OUTPATIENT)
Dept: OBGYN CLINIC | Facility: CLINIC | Age: 82
End: 2022-11-01

## 2022-11-01 VITALS
SYSTOLIC BLOOD PRESSURE: 130 MMHG | HEIGHT: 65 IN | WEIGHT: 121 LBS | DIASTOLIC BLOOD PRESSURE: 78 MMHG | BODY MASS INDEX: 20.16 KG/M2

## 2022-11-01 DIAGNOSIS — M17.11 PRIMARY OSTEOARTHRITIS OF RIGHT KNEE: Primary | ICD-10-CM

## 2022-11-01 RX ORDER — BUPIVACAINE HYDROCHLORIDE 2.5 MG/ML
1 INJECTION, SOLUTION EPIDURAL; INFILTRATION; INTRACAUDAL
Status: COMPLETED | OUTPATIENT
Start: 2022-11-01 | End: 2022-11-01

## 2022-11-01 RX ORDER — BETAMETHASONE SODIUM PHOSPHATE AND BETAMETHASONE ACETATE 3; 3 MG/ML; MG/ML
6 INJECTION, SUSPENSION INTRA-ARTICULAR; INTRALESIONAL; INTRAMUSCULAR; SOFT TISSUE
Status: COMPLETED | OUTPATIENT
Start: 2022-11-01 | End: 2022-11-01

## 2022-11-01 RX ADMIN — BUPIVACAINE HYDROCHLORIDE 1 ML: 2.5 INJECTION, SOLUTION EPIDURAL; INFILTRATION; INTRACAUDAL at 13:36

## 2022-11-01 RX ADMIN — BETAMETHASONE SODIUM PHOSPHATE AND BETAMETHASONE ACETATE 6 MG: 3; 3 INJECTION, SUSPENSION INTRA-ARTICULAR; INTRALESIONAL; INTRAMUSCULAR; SOFT TISSUE at 13:36

## 2022-11-01 NOTE — ASSESSMENT & PLAN NOTE
Findings consistent with advanced right knee osteoarthritis  Discussed findings and treatment options with the patient  I provided patient cortisone injection as requested by patient, which patient tolerated well  I advised patient that she most likely will continue experiencing intermittent pain in her right knee  I advised patient to avoid repetitive squatting, kneeling, and climbing activities  Patient could alternate joint supplement and cortisone injections as needed  She will be able to have the joint supplement injection done in January and cortisone injection in early February  Cold compress over the right knee today  All patient's questions were answered to her satisfaction  This note is created using dictation transcription  It may contain typographical errors, grammatical errors, improperly dictated words, background noise and other errors

## 2022-11-01 NOTE — PROGRESS NOTES
Assessment:     1  Primary osteoarthritis of right knee        Plan:     Problem List Items Addressed This Visit        Musculoskeletal and Integument    Primary osteoarthritis of right knee - Primary     Findings consistent with advanced right knee osteoarthritis  Discussed findings and treatment options with the patient  I provided patient cortisone injection as requested by patient, which patient tolerated well  I advised patient that she most likely will continue experiencing intermittent pain in her right knee  I advised patient to avoid repetitive squatting, kneeling, and climbing activities  Patient could alternate joint supplement and cortisone injections as needed  She will be able to have the joint supplement injection done in January and cortisone injection in early February  Cold compress over the right knee today  All patient's questions were answered to her satisfaction  This note is created using dictation transcription  It may contain typographical errors, grammatical errors, improperly dictated words, background noise and other errors  Relevant Medications    betamethasone acetate-betamethasone sodium phosphate (CELESTONE) injection 6 mg (Completed)    bupivacaine (PF) (MARCAINE) 0 25 % injection 1 mL (Completed)    Other Relevant Orders    Large joint arthrocentesis: R knee (Completed)         Subjective:     Patient ID: Selena Chaudhary is a 80 y o  female  Chief Complaint:  80-year-old female status post joint supplement injection done in July 2022  Patient is doing well but has complained of intermittent pain in her right knee  Pain is mostly related to activities  She denies locking or giving way sensations  She does complain of aching pain in her knee at times, especially with weather change  Patient does have a heart condition and cannot take NSAIDs  She does use topical NSAID over her knee      Allergy:  No Known Allergies  Medications:  all current active meds have been reviewed  Past Medical History:  Past Medical History:   Diagnosis Date   • Acute otitis externa of left ear 4/12/2022   • Cardiomyopathy ([address] )    • Chronic diarrhea of unknown origin     resolved 12/22/2015   • Contact dermatitis     last assessed 04/19/2013   • Esophageal reflux     resolved 07/39/4757   • Helicobacter pylori gastrointestinal tract infection 10/25/2018   • Hyperlipidemia     resolved 12/22/2015   • Muscle weakness (generalized)     resolved 12/22/2015   • Pacemaker    • Reactive airway disease     resolved 12/22/2015     Past Surgical History:  Past Surgical History:   Procedure Laterality Date   • LEG SURGERY Right    • OTHER SURGICAL HISTORY      open treatment of weight bearing distal tibial fracture     Family History:  Family History   Problem Relation Age of Onset   • Heart disease Father    • Diabetes Daughter    • Mental illness Daughter         disorder     Social History:  Social History     Substance and Sexual Activity   Alcohol Use No    Comment: denies alcohol use causing problems     Social History     Substance and Sexual Activity   Drug Use No     Social History     Tobacco Use   Smoking Status Never Smoker   Smokeless Tobacco Never Used     Review of Systems   Constitutional: Negative  HENT: Negative  Eyes: Negative  Respiratory: Negative  Cardiovascular: Negative  Gastrointestinal: Negative  Endocrine: Negative  Genitourinary: Negative  Musculoskeletal: Positive for arthralgias (Right knee)  Skin: Negative  Allergic/Immunologic: Negative  Hematological: Negative  Psychiatric/Behavioral: Negative  Objective:  BP Readings from Last 1 Encounters:   11/01/22 130/78      Wt Readings from Last 1 Encounters:   11/01/22 54 9 kg (121 lb)      BMI:   Estimated body mass index is 20 45 kg/m² as calculated from the following:    Height as of this encounter: 5' 4 5" (1 638 m)  Weight as of this encounter: 54 9 kg (121 lb)    BSA:   Estimated body surface area is 1 59 meters squared as calculated from the following:    Height as of this encounter: 5' 4 5" (1 638 m)  Weight as of this encounter: 54 9 kg (121 lb)  Physical Exam  Vitals and nursing note reviewed  Constitutional:       Appearance: Normal appearance  She is well-developed  HENT:      Head: Normocephalic and atraumatic  Right Ear: External ear normal       Left Ear: External ear normal    Eyes:      Extraocular Movements: Extraocular movements intact  Conjunctiva/sclera: Conjunctivae normal    Pulmonary:      Effort: Pulmonary effort is normal    Musculoskeletal:      Cervical back: Neck supple  Right knee: No effusion  Instability Tests: Medial Fanny test negative and lateral Fanny test negative  Skin:     General: Skin is warm and dry  Neurological:      Mental Status: She is alert and oriented to person, place, and time  Deep Tendon Reflexes: Reflexes are normal and symmetric  Psychiatric:         Mood and Affect: Mood normal          Behavior: Behavior normal          Right Knee Exam     Muscle Strength   The patient has normal right knee strength  Tenderness   The patient is experiencing tenderness in the medial joint line  Range of Motion   The patient has normal right knee ROM  Tests   Fanny:  Medial - negative Lateral - negative  Varus: negative Valgus: negative  Patellar apprehension: negative    Other   Erythema: absent  Sensation: normal  Pulse: present  Swelling: none  Effusion: no effusion present            No new images for review today     Large joint arthrocentesis: R knee  Universal Protocol:  Consent: Verbal consent obtained    Risks and benefits: risks, benefits and alternatives were discussed  Consent given by: patient  Patient understanding: patient states understanding of the procedure being performed  Site marked: the operative site was marked  Supporting Documentation  Indications: pain   Procedure Details  Location: knee - R knee  Preparation: Patient was prepped and draped in the usual sterile fashion  Needle size: 22 G  Ultrasound guidance: no  Approach: anterolateral  Medications administered: 6 mg betamethasone acetate-betamethasone sodium phosphate 6 (3-3) mg/mL; 1 mL bupivacaine (PF) 0 25 %    Patient tolerance: patient tolerated the procedure well with no immediate complications  Dressing:  Sterile dressing applied

## 2022-11-03 DIAGNOSIS — J30.2 SEASONAL ALLERGIC RHINITIS, UNSPECIFIED TRIGGER: ICD-10-CM

## 2022-11-03 RX ORDER — LORATADINE 10 MG/1
10 TABLET ORAL DAILY
Qty: 30 TABLET | Refills: 3 | Status: SHIPPED | OUTPATIENT
Start: 2022-11-03

## 2022-11-22 ENCOUNTER — APPOINTMENT (OUTPATIENT)
Dept: LAB | Facility: CLINIC | Age: 82
End: 2022-11-22

## 2022-11-22 DIAGNOSIS — N18.31 STAGE 3A CHRONIC KIDNEY DISEASE (HCC): ICD-10-CM

## 2022-11-22 DIAGNOSIS — E55.9 VITAMIN D DEFICIENCY: ICD-10-CM

## 2022-11-22 DIAGNOSIS — I10 PRIMARY HYPERTENSION: ICD-10-CM

## 2022-11-22 LAB
25(OH)D3 SERPL-MCNC: 24 NG/ML (ref 30–100)
ALBUMIN SERPL BCP-MCNC: 3.7 G/DL (ref 3.5–5)
ALP SERPL-CCNC: 54 U/L (ref 46–116)
ALT SERPL W P-5'-P-CCNC: 21 U/L (ref 12–78)
ANION GAP SERPL CALCULATED.3IONS-SCNC: 5 MMOL/L (ref 4–13)
AST SERPL W P-5'-P-CCNC: 20 U/L (ref 5–45)
BACTERIA UR QL AUTO: ABNORMAL /HPF
BILIRUB SERPL-MCNC: 0.61 MG/DL (ref 0.2–1)
BILIRUB UR QL STRIP: NEGATIVE
BUN SERPL-MCNC: 20 MG/DL (ref 5–25)
CALCIUM SERPL-MCNC: 9.5 MG/DL (ref 8.3–10.1)
CHLORIDE SERPL-SCNC: 105 MMOL/L (ref 96–108)
CLARITY UR: CLEAR
CO2 SERPL-SCNC: 26 MMOL/L (ref 21–32)
COLOR UR: ABNORMAL
CREAT SERPL-MCNC: 1.02 MG/DL (ref 0.6–1.3)
CREAT UR-MCNC: 53.5 MG/DL
GFR SERPL CREATININE-BSD FRML MDRD: 51 ML/MIN/1.73SQ M
GLUCOSE P FAST SERPL-MCNC: 100 MG/DL (ref 65–99)
GLUCOSE UR STRIP-MCNC: NEGATIVE MG/DL
HGB UR QL STRIP.AUTO: NEGATIVE
HYALINE CASTS #/AREA URNS LPF: ABNORMAL /LPF
KETONES UR STRIP-MCNC: NEGATIVE MG/DL
LEUKOCYTE ESTERASE UR QL STRIP: ABNORMAL
NITRITE UR QL STRIP: POSITIVE
NON-SQ EPI CELLS URNS QL MICRO: ABNORMAL /HPF
PH UR STRIP.AUTO: 6 [PH]
POTASSIUM SERPL-SCNC: 4.1 MMOL/L (ref 3.5–5.3)
PROT SERPL-MCNC: 8 G/DL (ref 6.4–8.4)
PROT UR STRIP-MCNC: NEGATIVE MG/DL
PROT UR-MCNC: 7 MG/DL
PROT/CREAT UR: 0.13 MG/G{CREAT} (ref 0–0.1)
PTH-INTACT SERPL-MCNC: 85.2 PG/ML (ref 18.4–80.1)
RBC #/AREA URNS AUTO: ABNORMAL /HPF
SODIUM SERPL-SCNC: 136 MMOL/L (ref 135–147)
SP GR UR STRIP.AUTO: 1.01 (ref 1–1.03)
UROBILINOGEN UR STRIP-ACNC: <2 MG/DL
WBC #/AREA URNS AUTO: ABNORMAL /HPF
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
## 2022-11-30 ENCOUNTER — TELEPHONE (OUTPATIENT)
Dept: OBGYN CLINIC | Facility: CLINIC | Age: 82
End: 2022-11-30

## 2022-12-06 ENCOUNTER — REMOTE DEVICE CLINIC VISIT (OUTPATIENT)
Dept: CARDIOLOGY CLINIC | Facility: CLINIC | Age: 82
End: 2022-12-06

## 2022-12-06 ENCOUNTER — TELEPHONE (OUTPATIENT)
Dept: FAMILY MEDICINE CLINIC | Facility: CLINIC | Age: 82
End: 2022-12-06

## 2022-12-06 DIAGNOSIS — Z95.810 AICD (AUTOMATIC CARDIOVERTER/DEFIBRILLATOR) PRESENT: Primary | ICD-10-CM

## 2022-12-06 RX ORDER — DENOSUMAB 60 MG/ML
INJECTION SUBCUTANEOUS
COMMUNITY
Start: 2022-11-30

## 2022-12-06 RX ORDER — LOSARTAN POTASSIUM 100 MG/1
TABLET ORAL EVERY 24 HOURS
COMMUNITY

## 2022-12-06 RX ORDER — METOPROLOL SUCCINATE 25 MG/1
TABLET, EXTENDED RELEASE ORAL
COMMUNITY
Start: 2022-10-28

## 2022-12-06 RX ORDER — B-COMPLEX WITH VITAMIN C
TABLET ORAL EVERY 24 HOURS
COMMUNITY
Start: 2022-11-30

## 2022-12-06 NOTE — PROGRESS NOTES
Results for orders placed or performed in visit on 12/06/22   Cardiac EP device report    Narrative    LATITUDE TRANSMISSION: BATTERY VOLTAGE ADEQUATE (4 5 YRS)  AP-84%, BVP-100%  ALL AVAILABLE LEAD PARAMETERS WITHIN NORMAL LIMITS  NO SIGNIFICANT HIGH RATE EPISODES  NORMAL DEVICE FUNCTION   GV

## 2022-12-06 NOTE — TELEPHONE ENCOUNTER
[facility] is [facility] to [facility] and the prescribed her antibiotics for bronchitis and she has finished her last pills monday  [facility] also gave her a chest x-ray and that was cleared  She stating that she still can't get rid of it and she wanted to know what do you recommend her to do

## 2022-12-07 ENCOUNTER — OFFICE VISIT (OUTPATIENT)
Dept: FAMILY MEDICINE CLINIC | Facility: CLINIC | Age: 82
End: 2022-12-07

## 2022-12-07 ENCOUNTER — OFFICE VISIT (OUTPATIENT)
Dept: CARDIOLOGY CLINIC | Facility: CLINIC | Age: 82
End: 2022-12-07

## 2022-12-07 VITALS
HEIGHT: 65 IN | HEART RATE: 75 BPM | TEMPERATURE: 98 F | OXYGEN SATURATION: 98 % | RESPIRATION RATE: 14 BRPM | SYSTOLIC BLOOD PRESSURE: 126 MMHG | DIASTOLIC BLOOD PRESSURE: 84 MMHG | WEIGHT: 118.2 LBS | BODY MASS INDEX: 19.69 KG/M2

## 2022-12-07 VITALS
HEART RATE: 72 BPM | WEIGHT: 117.2 LBS | BODY MASS INDEX: 20.01 KG/M2 | HEIGHT: 64 IN | SYSTOLIC BLOOD PRESSURE: 172 MMHG | DIASTOLIC BLOOD PRESSURE: 84 MMHG

## 2022-12-07 DIAGNOSIS — I42.9 CARDIOMYOPATHY, UNSPECIFIED TYPE (HCC): Primary | ICD-10-CM

## 2022-12-07 DIAGNOSIS — Z95.810 ICD (IMPLANTABLE CARDIOVERTER-DEFIBRILLATOR) IN PLACE: ICD-10-CM

## 2022-12-07 DIAGNOSIS — I51.7 CARDIOMEGALY: ICD-10-CM

## 2022-12-07 DIAGNOSIS — J30.0 VASOMOTOR RHINITIS: Primary | ICD-10-CM

## 2022-12-07 DIAGNOSIS — I05.9 MITRAL VALVE DISEASE: ICD-10-CM

## 2022-12-07 DIAGNOSIS — R05.8 POST-VIRAL COUGH SYNDROME: ICD-10-CM

## 2022-12-07 RX ORDER — BENZONATATE 100 MG/1
100 CAPSULE ORAL 3 TIMES DAILY PRN
Qty: 20 CAPSULE | Refills: 0 | Status: SHIPPED | OUTPATIENT
Start: 2022-12-07

## 2022-12-07 RX ORDER — AZELASTINE 1 MG/ML
1 SPRAY, METERED NASAL 2 TIMES DAILY
Qty: 30 ML | Refills: 5 | Status: SHIPPED | OUTPATIENT
Start: 2022-12-07

## 2022-12-07 NOTE — PROGRESS NOTES
Assessment/Plan:  Problem List Items Addressed This Visit        Respiratory    Post-viral cough syndrome     The patient is feeling better from her bronchitis  She did improved with antibiotics  I believe that her cough currently is residual from her recent infection  I advised her that it will run its course  We will prescribe the Tessalon Perles to help with the cough to take as needed  We will see her back as scheduled  Relevant Medications    benzonatate (TESSALON PERLES) 100 mg capsule    Vasomotor rhinitis - Primary     The patient's postnasal drainage is most likely related to vasomotor rhinitis  We will treat her with the Astelin nasal spray as indicated  She will call and report on her symptoms in 1 to 2 weeks  We will see her back as scheduled  Relevant Medications    azelastine (ASTELIN) 0 1 % nasal spray       Return if symptoms worsen or fail to improve  I spent 20 minutes during the visit reviewing the history from the patient, performing the examination, discussing the findings with the patient, providing counseling and education, and making a plan  I spent 10 minutes ordering referrals and testing and documenting  Subjective:   Chief Complaint   Patient presents with   • Bronchitis     Covid/flu test were negative and CXR was normal  Urgent care said she has bronchitis , she took a round of antibiotics and is not better so she would like more antibiotics        Patient ID: Kevin Guzman is a 80 y o  female presents today for postnasal drainage and a persistent cough  Kevin Guzman is a 80 y o  female who presents today with a persistent cough and postnasal drainage after being treated for bronchitis recently at urgent care  She was seen at [facility] in [place] on 11/25/2022- she had a negative covid and flu test and her CXR was normal   She was diagnosed with Bronchitis  She was prescribed a 10 day course of doxycycline    She still has PND and congestion- the cough is better  There are no fevers  There is no shortness of breath  She is still coughing on and off  There is rhinorrhea It is clear  She has some allergies  Cough  This is a new problem  The current episode started 1 to 4 weeks ago  The problem has been gradually improving  The problem occurs constantly  The cough is non-productive  Associated symptoms include postnasal drip  Pertinent negatives include no chest pain, chills, ear congestion, ear pain, fever, headaches, heartburn, hemoptysis, myalgias, nasal congestion, rash, rhinorrhea, sore throat, shortness of breath, sweats, weight loss or wheezing       The following portions of the patient's history were reviewed and updated as appropriate: allergies, current medications, past family history, past medical history, past social history, past surgical history and problem list   Patient Active Problem List   Diagnosis   • Allergic rhinitis   • Cardiomyopathy ([place] [address] )   • CHF (congestive heart failure) ([place] [address] )   • Cough, persistent   • GERD (gastroesophageal reflux disease)   • Hypertension   • Osteoporosis   • TMJ syndrome   • Positive 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 disease   • AICD (automatic cardioverter/defibrillator) present   • Other specified glaucoma   • Primary osteoarthritis of right knee   • Environmental and seasonal allergies   • Chronic pain of left knee   • Primary osteoarthritis of left knee   • Leg length discrepancy   • Tricuspid regurgitation   • PVC's (premature ventricular contractions)   • Stage 3a chronic kidney disease ([place] [address] )   • Vitamin D deficiency   • Vasomotor rhinitis   • Post-viral cough syndrome     Past Medical History:   Diagnosis Date   • Acute otitis externa of left ear 4/12/2022   • Cardiomyopathy ([facility] )    • Chronic diarrhea of unknown origin     resolved 12/22/2015   • Contact dermatitis     last assessed 04/19/2013   • Esophageal reflux     resolved 20/23/2453   • Helicobacter pylori gastrointestinal tract infection 10/25/2018   • Hyperlipidemia     resolved 12/22/2015   • Muscle weakness (generalized)     resolved 12/22/2015   • Pacemaker    • Reactive airway disease     resolved 12/22/2015     Past Surgical History:   Procedure Laterality Date   • LEG SURGERY Right    • OTHER SURGICAL HISTORY      open treatment of weight bearing distal tibial fracture     No Known Allergies  Family History   Problem Relation Age of Onset   • Heart disease Father    • Diabetes Daughter    • Mental illness Daughter         disorder     Social History     Socioeconomic History   • Marital status:      Spouse name: Not on file   • Number of children: Not on file   • Years of education: Not on file   • Highest education level: Not on file   Occupational History   • Occupation: retired   Tobacco Use   • Smoking status: Never   • Smokeless tobacco: Never   Vaping Use   • Vaping Use: Never used   Substance and Sexual Activity   • Alcohol use: No     Comment: denies alcohol use causing problems   • Drug use: No   • Sexual activity: Not on file   Other Topics Concern   • Not on file   Social History Narrative      Social Determinants of Health     Financial Resource Strain: Not on file   Food Insecurity: Not on file   Transportation Needs: Not on file   Physical Activity: Insufficiently Active   • Days of Exercise per Week: 2 days   • Minutes of Exercise per Session: 30 min   Stress: No Stress Concern Present   • Feeling of Stress :  Only a little   Social Connections: Not on file   Intimate Partner Violence: Not At Risk   • Fear of Current or Ex-Partner: No   • Emotionally Abused: No   • Physically Abused: No   • Sexually Abused: No   Housing Stability: Not on file     Current Outpatient Medications on File Prior to Visit   Medication Sig Dispense Refill   • amiodarone 200 mg tablet Take 0 5 tablets by mouth daily     • aspirin 81 MG tablet Take 1 tablet by mouth daily     • brimonidine tartrate 0 2 % ophthalmic solution 1 drop 3 (three) times a day     • Calcium Carbonate-Vitamin D 600-5 MG-MCG TABS every 24 hours     • Cholecalciferol (Vitamin D3) 25 MCG (1000 UT) CAPS every 24 hours     • denosumab (Prolia) 60 mg/mL as directed     • furosemide (LASIX) 40 mg tablet Take 40 mg by mouth daily       • latanoprost (XALATAN) 0 005 % ophthalmic solution      • loratadine (CLARITIN) 10 mg tablet Take 1 tablet (10 mg total) by mouth daily 30 tablet 3   • losartan (COZAAR) 100 MG tablet every 24 hours (Patient not taking: Reported on 12/7/2022)     • losartan (COZAAR) 25 mg tablet Take 1 tablet by mouth daily     • metoprolol succinate (TOPROL-XL) 25 mg 24 hr tablet  (Patient not taking: Reported on 12/7/2022)     • metoprolol succinate (TOPROL-XL) 50 mg 24 hr tablet Take 1 tablet by mouth daily     • Multiple Vitamin (DAILY VALUE MULTIVITAMIN) TABS Take 1 tablet by mouth daily       No current facility-administered medications on file prior to visit  Review of Systems   Constitutional: Negative  Negative for chills, fever and weight loss  HENT: Positive for postnasal drip  Negative for ear pain, rhinorrhea and sore throat  Eyes: Negative  Respiratory: Positive for cough  Negative for hemoptysis, shortness of breath and wheezing  Cardiovascular: Negative  Negative for chest pain  Gastrointestinal: Negative  Negative for heartburn  Endocrine: Negative  Genitourinary: Negative  Musculoskeletal: Negative  Negative for myalgias  Skin: Negative  Negative for rash  Allergic/Immunologic: Negative  Neurological: Negative  Negative for headaches  Hematological: Negative  Psychiatric/Behavioral: Negative  Objective:  Vitals:    12/07/22 0934   BP: 126/84   BP Location: Left arm   Patient Position: Sitting   Cuff Size: Standard   Pulse: 75   Resp: 14   Temp: 98 °F (36 7 °C)   SpO2: 98%   Weight: 53 6 kg (118 lb 3 2 oz)   Height: 5' 4 5" (1 638 m)     Body mass index is 19 98 kg/m²       Physical Exam  Constitutional:       Appearance: She is well-developed  HENT:      Head: Normocephalic and atraumatic  Mouth/Throat:      Pharynx: No oropharyngeal exudate  Eyes:      Conjunctiva/sclera: Conjunctivae normal       Pupils: Pupils are equal, round, and reactive to light  Neck:      Thyroid: No thyromegaly  Vascular: No JVD  Trachea: No tracheal deviation  Cardiovascular:      Rate and Rhythm: Normal rate and regular rhythm  Heart sounds: Normal heart sounds  No murmur heard  No friction rub  No gallop  Pulmonary:      Effort: Pulmonary effort is normal  No respiratory distress  Breath sounds: Normal breath sounds  No stridor  No wheezing or rales  Chest:      Chest wall: No tenderness  Abdominal:      General: Bowel sounds are normal  There is no distension  Palpations: Abdomen is soft  There is no mass  Tenderness: There is no abdominal tenderness  There is no guarding or rebound  Musculoskeletal:         General: No tenderness or deformity  Normal range of motion  Cervical back: Normal range of motion  Lymphadenopathy:      Cervical: No cervical adenopathy  Skin:     General: Skin is warm and dry  Neurological:      Mental Status: She is alert and oriented to person, place, and time  Cranial Nerves: No cranial nerve deficit  Motor: No abnormal muscle tone  Coordination: Coordination normal       Deep Tendon Reflexes: Reflexes are normal and symmetric  Reflexes normal              Depression Screening and Follow-up Plan: Patient was screened for depression during today's encounter  They screened negative with a PHQ-2 score of 0

## 2022-12-12 PROBLEM — J30.0 VASOMOTOR RHINITIS: Status: ACTIVE | Noted: 2022-12-12

## 2022-12-12 PROBLEM — R05.8 POST-VIRAL COUGH SYNDROME: Status: ACTIVE | Noted: 2022-12-12

## 2022-12-12 NOTE — ASSESSMENT & PLAN NOTE
The patient is feeling better from her bronchitis  She did improved with antibiotics  I believe that her cough currently is residual from her recent infection  I advised her that it will run its course  We will prescribe the Tessalon Perles to help with the cough to take as needed  We will see her back as scheduled

## 2022-12-12 NOTE — ASSESSMENT & PLAN NOTE
The patient's postnasal drainage is most likely related to vasomotor rhinitis  We will treat her with the Astelin nasal spray as indicated  She will call and report on her symptoms in 1 to 2 weeks  We will see her back as scheduled

## 2023-01-11 ENCOUNTER — TELEPHONE (OUTPATIENT)
Dept: FAMILY MEDICINE CLINIC | Facility: CLINIC | Age: 83
End: 2023-01-11

## 2023-01-11 ENCOUNTER — TELEPHONE (OUTPATIENT)
Dept: NEPHROLOGY | Facility: CLINIC | Age: 83
End: 2023-01-11

## 2023-01-11 NOTE — TELEPHONE ENCOUNTER
Left message stressing the importance of her upcoming appointment with nephrology and urged her not to cancel

## 2023-01-11 NOTE — TELEPHONE ENCOUNTER
Left message with patient stating the appointment for 01/13/23 has to be reschedule due to a change in schedule  Appointment for 01/13/23 is cancelled

## 2023-01-11 NOTE — TELEPHONE ENCOUNTER
De Berrylucio Graves is wondering why she has to see Nephrology? She has an appt on Fri with them and would like to cancel if not needed   Please advise   Thank you

## 2023-02-01 ENCOUNTER — OFFICE VISIT (OUTPATIENT)
Dept: OBGYN CLINIC | Facility: CLINIC | Age: 83
End: 2023-02-01

## 2023-02-01 VITALS — WEIGHT: 117 LBS | DIASTOLIC BLOOD PRESSURE: 72 MMHG | SYSTOLIC BLOOD PRESSURE: 122 MMHG | BODY MASS INDEX: 20.08 KG/M2

## 2023-02-01 DIAGNOSIS — M17.11 PRIMARY OSTEOARTHRITIS OF RIGHT KNEE: Primary | ICD-10-CM

## 2023-02-01 RX ORDER — BETAMETHASONE SODIUM PHOSPHATE AND BETAMETHASONE ACETATE 3; 3 MG/ML; MG/ML
6 INJECTION, SUSPENSION INTRA-ARTICULAR; INTRALESIONAL; INTRAMUSCULAR; SOFT TISSUE
Status: COMPLETED | OUTPATIENT
Start: 2023-02-01 | End: 2023-02-01

## 2023-02-01 RX ORDER — BUPIVACAINE HYDROCHLORIDE 2.5 MG/ML
8 INJECTION, SOLUTION INFILTRATION; PERINEURAL
Status: COMPLETED | OUTPATIENT
Start: 2023-02-01 | End: 2023-02-01

## 2023-02-01 RX ADMIN — BUPIVACAINE HYDROCHLORIDE 8 ML: 2.5 INJECTION, SOLUTION INFILTRATION; PERINEURAL at 10:33

## 2023-02-01 RX ADMIN — BETAMETHASONE SODIUM PHOSPHATE AND BETAMETHASONE ACETATE 6 MG: 3; 3 INJECTION, SUSPENSION INTRA-ARTICULAR; INTRALESIONAL; INTRAMUSCULAR; SOFT TISSUE at 10:33

## 2023-02-01 NOTE — ASSESSMENT & PLAN NOTE
Findings consistent with advanced right knee osteoarthritis  Discussed findings and treatment options with the patient  Repeat cortisone injection was given to the right knee joint today  She tolerated the procedure well  Advised to apply cold compress today  Follow-up as needed if symptoms return  Advised patient the soonest she can have another cortisone injection is in 3 to 4 months  All patient's questions were answered to her satisfaction  This note is created using dictation transcription  It may contain typographical errors, grammatical errors, improperly dictated words, background noise and other errors

## 2023-02-01 NOTE — PROGRESS NOTES
Assessment:     1  Primary osteoarthritis of right knee        Plan:     Problem List Items Addressed This Visit        Musculoskeletal and Integument    Primary osteoarthritis of right knee - Primary     Findings consistent with advanced right knee osteoarthritis  Discussed findings and treatment options with the patient  Repeat cortisone injection was given to the right knee joint today  She tolerated the procedure well  Advised to apply cold compress today  Follow-up as needed if symptoms return  Advised patient the soonest she can have another cortisone injection is in 3 to 4 months  All patient's questions were answered to her satisfaction  This note is created using dictation transcription  It may contain typographical errors, grammatical errors, improperly dictated words, background noise and other errors  Relevant Medications    betamethasone acetate-betamethasone sodium phosphate (CELESTONE) injection 6 mg (Completed)    bupivacaine (MARCAINE) 0 25 % injection 8 mL (Completed)    Other Relevant Orders    Large joint arthrocentesis: R knee (Completed)      Subjective:     Patient ID: Marlon James is a 80 y o  female  Chief Complaint:  51-year-old female following up for advanced right knee osteoarthritis  Last cortisone injection was on 11/1/22  She had relief until recently  She is complaining of aching pain worse with weather changes and increased stiffness in the morning  She denies any new injury  She would like repeat cortisone injection today      Allergy:  No Known Allergies  Medications:  all current active meds have been reviewed  Past Medical History:  Past Medical History:   Diagnosis Date   • Acute otitis externa of left ear 4/12/2022   • Cardiomyopathy [facility])    • Chronic diarrhea of unknown origin     resolved 12/22/2015   • Contact dermatitis     last assessed 04/19/2013   • Esophageal reflux     resolved 98/33/9312   • Helicobacter pylori gastrointestinal tract infection 10/25/2018   • Hyperlipidemia     resolved 12/22/2015   • Muscle weakness (generalized)     resolved 12/22/2015   • Pacemaker    • Reactive airway disease     resolved 12/22/2015     Past Surgical History:  Past Surgical History:   Procedure Laterality Date   • LEG SURGERY Right    • OTHER SURGICAL HISTORY      open treatment of weight bearing distal tibial fracture     Family History:  Family History   Problem Relation Age of Onset   • Heart disease Father    • Diabetes Daughter    • Mental illness Daughter         disorder     Social History:  Social History     Substance and Sexual Activity   Alcohol Use No    Comment: denies alcohol use causing problems     Social History     Substance and Sexual Activity   Drug Use No     Social History     Tobacco Use   Smoking Status Never   Smokeless Tobacco Never     Review of Systems   Constitutional: Negative  HENT: Negative  Eyes: Negative  Respiratory: Negative  Cardiovascular: Negative  Gastrointestinal: Negative  Endocrine: Negative  Genitourinary: Negative  Musculoskeletal: Positive for arthralgias (Right knee)  Skin: Negative  Allergic/Immunologic: Negative  Hematological: Negative  Psychiatric/Behavioral: Negative  Objective:  BP Readings from Last 1 Encounters:   02/01/23 122/72      Wt Readings from Last 1 Encounters:   02/01/23 53 1 kg (117 lb)      BMI:   Estimated body mass index is 20 08 kg/m² as calculated from the following:    Height as of 12/7/22: 5' 4" (1 626 m)  Weight as of this encounter: 53 1 kg (117 lb)  BSA:   Estimated body surface area is 1 56 meters squared as calculated from the following:    Height as of 12/7/22: 5' 4" (1 626 m)  Weight as of this encounter: 53 1 kg (117 lb)  Physical Exam  Vitals and nursing note reviewed  Constitutional:       Appearance: Normal appearance  She is well-developed  HENT:      Head: Normocephalic and atraumatic        Right Ear: External ear normal  Left Ear: External ear normal    Eyes:      Extraocular Movements: Extraocular movements intact  Conjunctiva/sclera: Conjunctivae normal    Pulmonary:      Effort: Pulmonary effort is normal    Musculoskeletal:      Cervical back: Neck supple  Right knee: No effusion  Instability Tests: Medial Fanny test negative and lateral Fanny test negative  Skin:     General: Skin is warm and dry  Neurological:      Mental Status: She is alert and oriented to person, place, and time  Deep Tendon Reflexes: Reflexes are normal and symmetric  Psychiatric:         Mood and Affect: Mood normal          Behavior: Behavior normal          Right Knee Exam     Muscle Strength   The patient has normal right knee strength  Tenderness   The patient is experiencing tenderness in the medial joint line  Range of Motion   The patient has normal right knee ROM  Tests   Fanny:  Medial - negative Lateral - negative  Varus: negative Valgus: negative  Patellar apprehension: negative    Other   Erythema: absent  Sensation: normal  Pulse: present  Swelling: none  Effusion: no effusion present            No new images for review today     Large joint arthrocentesis: R knee  Universal Protocol:  Consent: Verbal consent obtained    Risks and benefits: risks, benefits and alternatives were discussed  Consent given by: patient  Patient understanding: patient states understanding of the procedure being performed    Supporting Documentation  Indications: pain   Procedure Details  Location: knee - R knee  Preparation: Patient was prepped and draped in the usual sterile fashion  Needle size: 22 G  Approach: anterolateral  Medications administered: 6 mg betamethasone acetate-betamethasone sodium phosphate 6 (3-3) mg/mL; 8 mL bupivacaine 0 25 %    Patient tolerance: patient tolerated the procedure well with no immediate complications  Dressing:  Sterile dressing applied        Scribe Attestation    I,:  Sylvia Tripathi, JULIETA am acting as a scribe while in the presence of the attending physician :       I,:  Martha Feng MD personally performed the services described in this documentation    as scribed in my presence :

## 2023-02-02 ENCOUNTER — TELEPHONE (OUTPATIENT)
Dept: OTHER | Facility: OTHER | Age: 83
End: 2023-02-02

## 2023-02-03 DIAGNOSIS — I49.3 PVC'S (PREMATURE VENTRICULAR CONTRACTIONS): Primary | ICD-10-CM

## 2023-02-03 RX ORDER — AMIODARONE HYDROCHLORIDE 200 MG/1
100 TABLET ORAL DAILY
Qty: 45 TABLET | Refills: 3 | Status: SHIPPED | OUTPATIENT
Start: 2023-02-03

## 2023-02-03 NOTE — TELEPHONE ENCOUNTER
Pt stated needs to have new prescriptions  written  Stated they were ordered by her previous cardiologist who was in [facility]  Pt [facility]  [phone]     losartan (COZAAR) 100 MG tablet    metoprolol succinate (TOPROL-XL) 25 mg 24 hr tablet    amiodarone 200 mg tablet    Please call patient to advise once completed

## 2023-02-21 DIAGNOSIS — I10 PRIMARY HYPERTENSION: Primary | ICD-10-CM

## 2023-02-21 RX ORDER — METOPROLOL SUCCINATE 25 MG/1
25 TABLET, EXTENDED RELEASE ORAL DAILY
Qty: 90 TABLET | Refills: 2 | Status: SHIPPED | OUTPATIENT
Start: 2023-02-21

## 2023-02-21 RX ORDER — LOSARTAN POTASSIUM 100 MG/1
100 TABLET ORAL EVERY 24 HOURS
Qty: 90 TABLET | Refills: 2 | Status: SHIPPED | OUTPATIENT
Start: 2023-02-21

## 2023-03-07 ENCOUNTER — REMOTE DEVICE CLINIC VISIT (OUTPATIENT)
Dept: CARDIOLOGY CLINIC | Facility: CLINIC | Age: 83
End: 2023-03-07

## 2023-03-07 DIAGNOSIS — Z95.810 ICD (IMPLANTABLE CARDIOVERTER-DEFIBRILLATOR) IN PLACE: Primary | ICD-10-CM

## 2023-03-07 NOTE — PROGRESS NOTES
Results for orders placed or performed in visit on 03/07/23   Cardiac EP device report    Narrative    BSC BI-V ICD - NOT MRI CONDITIONAL  LATITUDE TRANSMISSION: BATTERY VOLTAGE ADEQUATE (4 5 YRS)  AP 87% RVP/% ; ALL AVAILABLE LEAD PARAMETERS WITHIN NORMAL LIMITS  NO SIGNIFICANT HIGH RATE EPISODES  APPROPRIATELY FUNCTIONING BI-V ICD    ES

## 2023-03-29 ENCOUNTER — CONSULT (OUTPATIENT)
Dept: NEPHROLOGY | Facility: CLINIC | Age: 83
End: 2023-03-29

## 2023-03-29 VITALS
BODY MASS INDEX: 20.14 KG/M2 | HEIGHT: 64 IN | SYSTOLIC BLOOD PRESSURE: 132 MMHG | DIASTOLIC BLOOD PRESSURE: 74 MMHG | WEIGHT: 118 LBS

## 2023-03-29 DIAGNOSIS — I42.9 CARDIOMYOPATHY, UNSPECIFIED TYPE (HCC): ICD-10-CM

## 2023-03-29 DIAGNOSIS — I10 PRIMARY HYPERTENSION: ICD-10-CM

## 2023-03-29 DIAGNOSIS — Z95.810 AICD (AUTOMATIC CARDIOVERTER/DEFIBRILLATOR) PRESENT: ICD-10-CM

## 2023-03-29 DIAGNOSIS — E55.9 VITAMIN D DEFICIENCY: ICD-10-CM

## 2023-03-29 DIAGNOSIS — I50.22 CHRONIC SYSTOLIC CONGESTIVE HEART FAILURE (HCC): ICD-10-CM

## 2023-03-29 DIAGNOSIS — N18.31 STAGE 3A CHRONIC KIDNEY DISEASE (HCC): Primary | ICD-10-CM

## 2023-03-29 DIAGNOSIS — N25.81 SECONDARY HYPERPARATHYROIDISM OF RENAL ORIGIN (HCC): ICD-10-CM

## 2023-03-29 NOTE — PATIENT INSTRUCTIONS
"- get blood work after the visit  - check blood pressure twice a day for two weeks and call with updates    - Please call me in 10 days after having your blood work done to review the results if you do not hear back from me or my office, as I may have not received the results  - please remember to perform blood work prior to the next visit  - Please call if the blood pressure top number is greater than 150 or less than 110 consistently  - Please call if you are gaining more than 2lbs in 2 days for adjustment of water pills   ~ Please AVOID the following pain medications  LIST OF NSAIDS (NONSTEROIDAL ANTI-INFLAMMATORY DRUGS) AND LORENZ-2 INHIBITORS    DIFLUNISAL (DOLOBID)  IBUPROFEN (MOTRIN, ADVIL)  FLURBIPROFEN (ANSAID)  KETOPROFEN (ORUDIS, ORUVAIL)  FENOPROFEN (NALFON)  NABUMETONE (RELAFEN)  PIROXICAM (FELDENE)  NAPROXEN (ALEVE, NAPROSYN, NAPRELAN, ANAPROX)  DICLOFENAC (VOLTAREN, CATAFLAM)  INDOMETHACIN (INDOCIN)  SULINDAC (CLINORIL)  TOLMETIN (TOLETIN)  ETODOLAC (LODINE)  MELOXICAM (MOBIC)  KETOROLAC (TORADOL)  OXAPROZIN (DAYPRO)  CELECOXIB (CELEBREX)    Phosphorus diet  Follow a low phosphorus diet      Avoid these higher phosphorus foods: Choose these lower phosphorus foods:   Milk, pudding or yogurt (from animals and from many soy varieties) Rice milk (unfortified), nondairy creamer (if it doesn't have terms in the ingredients list that contain the letters \"phos\")   Hard cheeses, ricotta or cottage cheese, fat-free cream cheese Regular and low-fat cream cheese   Ice cream or frozen yogurt Sherbet or frozen fruit pops   Soups made with higher phosphorus ingredients (milk, dried peas, beans, lentils) Soups made with lower phosphorus ingredients (broth- or water-based with other lower phosphorus ingredients)   Whole grains, including whole-grain breads, crackers, cereal, rice and pasta Refined grains, including white bread, crackers, cereals, rice and pasta   Quick breads, biscuits, cornbread, muffins, pancakes or " "waffles Homemade refined (white) dinner rolls, bagels or English muffins   Dried peas (split, black-eyed), beans (black, garbanzo, lima, kidney, navy, noel) or lentils Green peas (canned, frozen), green beans or wax beans   Organ meats, walleye, pollock or sardines Lean beef, pork, lamb, poultry or other fish   Nuts and seeds Popcorn   Peanut butter and other nut butters Jam, jelly or honey   Chocolate, including chocolate drinks Carob (chocolate-flavored) candy, hard candy or gumdrops   Loraine and pepper-type sodas, flavored gillette, bottled teas (if a term in the ingredients list contains the letters \"phos\") Lemon-lime soda, ginger ale or root beer, plain water   Follow a moderate potassium diet  Things to do to reduce your blood pressure include working with all your physician to do the following:  ~ stop smoking if you smoke  ~ increase cardiovascular exercise like walking and swimming    ~ modify your diet to decrease fat and salt intake  ~ reduce your weight if you are overweight or obese   ~ increase the consumption of fruits, vegetables and whole grains  ~ decrease alcohol consumption if you consume alcohol    ~ try to minimize stress in your life with lifestyle modifications  ~ be compliant with your anti-hypertensive medications  ~ adjust your medications to help improve your vascular stiffness and decrease risks for heart attacks and strokes      "

## 2023-03-29 NOTE — PROGRESS NOTES
Nephrology Initial Consultation  Donta Mijares 80 y o  female MRN: [id number]            REASON FOR CONSULTATION:  Donta Mijares is a 80 y  o female who was referred by Claude Archuleta DO for evaluation of Consult and Chronic Kidney Disease    ASSESSMENT / PLAN:   80 y o   female with pmh of multiple co-morbidities including CHF, hypertension (x 8yrs), GERD, hyperlipidemia, arthritis, nonischemic cardiomyopathy with BiV ICD in place presents to the office for evaluation and management of abnormal renal parameters  CKD stage 3A/B:  - After review of records in In [facility] as well as Care everywhere patient has a baseline creatinine of 0 8-1 2 mg/dL dating as far back as 2016  Most recent labs show a Creatinine of 1 02 mg/dL on 11/22/2022  Renal function remains stable    -Likely has underlying CKD secondary to age-related nephron loss plus cardiorenal syndrome plus hypertensive nephrosclerosis plus decreased nephron mass due to renal cysts  We will still rule out paraproteinemia check SPEP UPEP free light chain ratio  -Renal ultrasound from 9/29/2022 bilateral kidneys approximately 9 and 10 cm bilateral renal cysts  Simple cysts  - Acid base and lytes stable  - Clinically the patient appears to be euvolemic  Will not change diuretics at this time  - Recommend to avoid use of NSAIDs, nephrotoxins  Caution advised with regards to exposure to IV contrast dye    - Discussed with the patient in depth her renal status, including the possible etiologies for CKD  - Advised the patient that when her GFR is close to 20mL/min then will start discussing about RRT(renal replacement therapy) options such as renal transplant, peritoneal dialysis and hemodialysis  - Informed the patient about the various options for Renal Replacement therapy    - Discussed with the patient how we need to work together to delay the progression of CKD with optimal BP control based on their age and co-morbidities and trying to reduce proteinuria by the use of anti-proteinuric agents    -Discussed with the patient with regards to consideration for initiation of SGLT2 inhibitors  Likely will initiate at next visit  Hypertension:  - Patient is on losartan 100mg po Q24, Lasix 40 mg p o  daily, Toprol-XL 25 mg p o  daily    -Discussed with patient appropriate techniques of checking blood pressures and to call with blood pressure update in 2 weeks  - Goal BP of <  130/80 based on age and comorbidities  - Instructed to follow low sodium (2gm)diet   - Advised to hold ACEI/ARBs if patient suffers from dehydration due to gastrointestinal losses due to risk of ARUN secondary to failure to autoregulate  Hemoglobin:  - Goal Hb of 10-12 g/dL  - Most recent labs suggestive of 12 1 g/dL  -No role for IV iron    CKD-MBD(Mineral Bone Disease)/vitamin D deficiency/secondary hyperparathyroidism of renal origin:  - Based on patients CKD stage following is the goal of therapy  - Maintain calcium phosphorus product of < 55   - Stage 3 CKD - Goal Ca 8 5-10 mg/dL , goal Phos 2 7-4 6 mg/dL  , goal iPTH 30-70 pg/mL  - Patient is currently not at goal   - Continue patient on calcium plus D, vit D 1000 units po Q24  - Patients' most recent vitamin D level is 24 and intact PTH of 85 2 as of 11/22/2022  -Check intact PTH vitamin D after the visit and prior to next visit    Proteinuria:  - most recent UA positive for nitrites no blood no protein  -Most recent protein creatinine ratio 130 mg as of 11/22/2022  - check protein creatinine ratio  - currently on therapy for proteinuria with losartan    Lipids:  - goal LDL less than 70  - Management as per PCP    CHF/nonischemic cardiomyopathy/BiV ICD in place:  - Management as per primary team  -Last seen by cardiology 12/7/2022 notes reviewed  -Most recent echo from March 2022 with EF of 30 to 35%      Nutrition:  - Encouraged patient to follow a renal diet comprising of moderate potassium, low phosphorus and protein restriction to 0 8gm/kg  - Will check serum albumin with next blood work  Followup:  - Patient is to follow-up in4 months, with lab work to be performed after the visit and then again in a few days prior to the next visit  Advised patient to call me in 10 days to review the results if they do not hear back from me, as I may have not received the results  Frankie Van MD, [facility], 3/29/2023, 1:41 PM             HISTORY OF PRESENT ILLNESS: 80 y o  female with multiple comorbidities including CHF, hypertension (x 8yrs), GERD, hyperlipidemia, arthritis, nonischemic cardiomyopathy with BiV ICD in place presents to the office for evaluation and management of abnormal renal parameters  Review of record shows patient has a baseline creatinine of 0 8 to 1 2 mg/dL dating as far back as 2016 most recent labs from 11/22/2022 with a creatinine 1 02 mg/dL  Last seen by cardiology 12/7/2022  No recent hospitalizations  No NSAID use no history of kidney stones no history of ARUN needing dialysis never seen a nephrologist before  No issues with edema  Has been on Lasix for a while  Thankful for the care information that she is gone today was not aware of having CKD  Is following up with orthopedics  Did report occasional dizziness advised patient to check blood pressure at least twice a day to call with updates in case pressures are running low we may decrease losartan to 50 mg once a day  Review of Systems   Constitutional: Positive for fatigue  Negative for chills and fever  HENT: Negative for sore throat  Respiratory: Negative for cough, shortness of breath and wheezing  Cardiovascular: Negative for leg swelling  Gastrointestinal: Negative for abdominal pain, constipation, diarrhea and vomiting  Genitourinary: Negative for difficulty urinating, dysuria and hematuria  Musculoskeletal: Positive for neck pain  Negative for back pain     Neurological: Negative for dizziness, light-headedness and headaches  Psychiatric/Behavioral: Negative for agitation and confusion  All other systems reviewed and are negative  PAST MEDICAL HISTORY:  Past Medical History:   Diagnosis Date   • Acute otitis externa of left ear 4/12/2022   • Cardiomyopathy [facility])    • Chronic diarrhea of unknown origin     resolved 12/22/2015   • Contact dermatitis     last assessed 04/19/2013   • Esophageal reflux     resolved 67/54/5822   • Helicobacter pylori gastrointestinal tract infection 10/25/2018   • Hyperlipidemia     resolved 12/22/2015   • Muscle weakness (generalized)     resolved 12/22/2015   • Pacemaker    • Reactive airway disease     resolved 12/22/2015       PROBLEM LIST    Patient Active Problem List   Diagnosis   • Allergic rhinitis   • Cardiomyopathy ([facility] )   • CHF (congestive heart failure) ([facility])   • Cough, persistent   • GERD (gastroesophageal reflux disease)   • Hypertension   • Osteoporosis   • TMJ syndrome   • Positive 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 disease   • AICD (automatic cardioverter/defibrillator) present   • Other specified glaucoma   • Primary osteoarthritis of right knee   • Environmental and seasonal allergies   • Chronic pain of left knee   • Primary osteoarthritis of left knee   • Leg length discrepancy   • Tricuspid regurgitation   • PVC's (premature ventricular contractions)   • Stage 3a chronic kidney disease ([facility])   • Vitamin D deficiency   • Vasomotor rhinitis   • Post-viral cough syndrome   • Secondary hyperparathyroidism of renal origin ([facility] )       PAST SURGICAL HISTORY:  Past Surgical History:   Procedure Laterality Date   • LEG SURGERY Left    • OTHER SURGICAL HISTORY      open treatment of weight bearing distal tibial fracture       SOCIAL HISTORY :   reports that she has never smoked  She has never used smokeless tobacco  She reports that she does not drink alcohol and does not use drugs      FAMILY HISTORY:  Family History   Problem Relation Age of Onset   • Heart disease Father    • "Diabetes Daughter    • Mental illness Daughter         disorder       ALLERGIES:  No Known Allergies        PHYSICAL EXAM:  Vitals:    03/29/23 1259   BP: 132/74   BP Location: Left arm   Patient Position: Sitting   Cuff Size: Adult   Weight: 53 5 kg (118 lb)   Height: 5' 4\" (1 626 m)     Body mass index is 20 25 kg/m²  Physical Exam  Vitals reviewed  Constitutional:       General: She is not in acute distress  Appearance: Normal appearance  She is normal weight  She is not ill-appearing, toxic-appearing or diaphoretic  HENT:      Head: Normocephalic and atraumatic  Mouth/Throat:      Mouth: Mucous membranes are moist       Pharynx: Oropharynx is clear  No oropharyngeal exudate  Eyes:      General: No scleral icterus  Conjunctiva/sclera: Conjunctivae normal    Cardiovascular:      Rate and Rhythm: Normal rate  Heart sounds: Normal heart sounds  No friction rub  Pulmonary:      Effort: Pulmonary effort is normal  No respiratory distress  Breath sounds: Normal breath sounds  No stridor  Abdominal:      General: There is no distension  Palpations: Abdomen is soft  There is no mass  Tenderness: There is no abdominal tenderness  There is no right CVA tenderness or left CVA tenderness  Musculoskeletal:         General: No swelling  Cervical back: Normal range of motion  No rigidity  Skin:     General: Skin is warm  Coloration: Skin is not jaundiced  Neurological:      General: No focal deficit present  Mental Status: She is alert and oriented to person, place, and time     Psychiatric:         Mood and Affect: Mood normal          Behavior: Behavior normal            LABORATORY DATA:     Results from last 6 Months   Lab Units 11/22/22  0933   POTASSIUM mmol/L 4 1   CHLORIDE mmol/L 105   CO2 mmol/L 26   BUN mg/dL 20   CREATININE mg/dL 1 02   CALCIUM mg/dL 9 5        rest all reviewed    RADIOLOGY:  No orders to display     Rest all " "reviewed        MEDICATIONS:    Current Outpatient Medications:   •  amiodarone 200 mg tablet, Take 0 5 tablets (100 mg total) by mouth daily, Disp: 45 tablet, Rfl: 3  •  aspirin 81 MG tablet, Take 1 tablet by mouth daily, Disp: , Rfl:   •  brimonidine tartrate 0 2 % ophthalmic solution, 1 drop 3 (three) times a day, Disp: , Rfl:   •  Calcium Carbonate-Vitamin D 600-5 MG-MCG TABS, every 24 hours, Disp: , Rfl:   •  Cholecalciferol (Vitamin D3) 25 MCG (1000 UT) CAPS, every 24 hours, Disp: , Rfl:   •  denosumab (Prolia) 60 mg/mL, as directed, Disp: , Rfl:   •  furosemide (LASIX) 40 mg tablet, Take 40 mg by mouth daily  , Disp: , Rfl:   •  latanoprost (XALATAN) 0 005 % ophthalmic solution, , Disp: , Rfl:   •  loratadine (CLARITIN) 10 mg tablet, Take 1 tablet (10 mg total) by mouth daily, Disp: 30 tablet, Rfl: 3  •  losartan (COZAAR) 100 MG tablet, Take 1 tablet (100 mg total) by mouth every 24 hours, Disp: 90 tablet, Rfl: 2  •  metoprolol succinate (TOPROL-XL) 25 mg 24 hr tablet, Take 1 tablet (25 mg total) by mouth daily, Disp: 90 tablet, Rfl: 2  •  Multiple Vitamin (DAILY VALUE MULTIVITAMIN) TABS, Take 1 tablet by mouth daily, Disp: , Rfl:   •  azelastine (ASTELIN) 0 1 % nasal spray, 1 spray into each nostril 2 (two) times a day Use in each nostril as directed (Patient not taking: Reported on 3/29/2023), Disp: 30 mL, Rfl: 5  •  benzonatate (TESSALON PERLES) 100 mg capsule, Take 1 capsule (100 mg total) by mouth 3 (three) times a day as needed for cough (Patient not taking: Reported on 3/29/2023), Disp: 20 capsule, Rfl: 0        Portions of the record may have been created with voice recognition software  Occasional wrong word or \"sound a like\" substitutions may have occurred due to the inherent limitations of voice recognition software  Read the chart carefully and recognize, using context, where substitutions have occurred  If you have any questions, please contact the dictating provider        "

## 2023-04-04 ENCOUNTER — APPOINTMENT (OUTPATIENT)
Dept: LAB | Facility: CLINIC | Age: 83
End: 2023-04-04

## 2023-04-04 ENCOUNTER — OFFICE VISIT (OUTPATIENT)
Dept: FAMILY MEDICINE CLINIC | Facility: CLINIC | Age: 83
End: 2023-04-04

## 2023-04-04 VITALS
OXYGEN SATURATION: 96 % | DIASTOLIC BLOOD PRESSURE: 72 MMHG | BODY MASS INDEX: 20.35 KG/M2 | HEART RATE: 62 BPM | TEMPERATURE: 97.8 F | SYSTOLIC BLOOD PRESSURE: 118 MMHG | HEIGHT: 64 IN | RESPIRATION RATE: 18 BRPM | WEIGHT: 119.2 LBS

## 2023-04-04 DIAGNOSIS — I42.9 CARDIOMYOPATHY, UNSPECIFIED TYPE (HCC): ICD-10-CM

## 2023-04-04 DIAGNOSIS — N18.31 STAGE 3A CHRONIC KIDNEY DISEASE (HCC): ICD-10-CM

## 2023-04-04 DIAGNOSIS — Z95.810 AICD (AUTOMATIC CARDIOVERTER/DEFIBRILLATOR) PRESENT: ICD-10-CM

## 2023-04-04 DIAGNOSIS — N25.81 SECONDARY HYPERPARATHYROIDISM OF RENAL ORIGIN (HCC): ICD-10-CM

## 2023-04-04 DIAGNOSIS — H61.23 BILATERAL IMPACTED CERUMEN: ICD-10-CM

## 2023-04-04 DIAGNOSIS — I10 PRIMARY HYPERTENSION: Primary | ICD-10-CM

## 2023-04-04 DIAGNOSIS — E55.9 VITAMIN D DEFICIENCY: ICD-10-CM

## 2023-04-04 DIAGNOSIS — H60.501 ACUTE OTITIS EXTERNA OF RIGHT EAR, UNSPECIFIED TYPE: ICD-10-CM

## 2023-04-04 DIAGNOSIS — I10 PRIMARY HYPERTENSION: ICD-10-CM

## 2023-04-04 DIAGNOSIS — I50.22 CHRONIC SYSTOLIC CONGESTIVE HEART FAILURE (HCC): ICD-10-CM

## 2023-04-04 PROBLEM — R05.8 POST-VIRAL COUGH SYNDROME: Status: RESOLVED | Noted: 2022-12-12 | Resolved: 2023-04-04

## 2023-04-04 LAB
ALBUMIN SERPL BCP-MCNC: 3.9 G/DL (ref 3.5–5)
ANION GAP SERPL CALCULATED.3IONS-SCNC: 3 MMOL/L (ref 4–13)
BACTERIA UR QL AUTO: ABNORMAL /HPF
BILIRUB UR QL STRIP: NEGATIVE
BUN SERPL-MCNC: 22 MG/DL (ref 5–25)
CALCIUM SERPL-MCNC: 9.3 MG/DL (ref 8.3–10.1)
CHLORIDE SERPL-SCNC: 106 MMOL/L (ref 96–108)
CLARITY UR: CLEAR
CO2 SERPL-SCNC: 27 MMOL/L (ref 21–32)
COLOR UR: COLORLESS
CREAT SERPL-MCNC: 1.02 MG/DL (ref 0.6–1.3)
CREAT UR-MCNC: 78.5 MG/DL
ERYTHROCYTE [DISTWIDTH] IN BLOOD BY AUTOMATED COUNT: 14.1 % (ref 11.6–15.1)
GFR SERPL CREATININE-BSD FRML MDRD: 51 ML/MIN/1.73SQ M
GLUCOSE SERPL-MCNC: 97 MG/DL (ref 65–140)
GLUCOSE UR STRIP-MCNC: NEGATIVE MG/DL
HCT VFR BLD AUTO: 39.5 % (ref 34.8–46.1)
HGB BLD-MCNC: 11.8 G/DL (ref 11.5–15.4)
HGB UR QL STRIP.AUTO: NEGATIVE
KETONES UR STRIP-MCNC: NEGATIVE MG/DL
LEUKOCYTE ESTERASE UR QL STRIP: ABNORMAL
MAGNESIUM SERPL-MCNC: 2.6 MG/DL (ref 1.6–2.6)
MCH RBC QN AUTO: 30.3 PG (ref 26.8–34.3)
MCHC RBC AUTO-ENTMCNC: 29.9 G/DL (ref 31.4–37.4)
MCV RBC AUTO: 102 FL (ref 82–98)
NITRITE UR QL STRIP: NEGATIVE
NON-SQ EPI CELLS URNS QL MICRO: ABNORMAL /HPF
PH UR STRIP.AUTO: 6.5 [PH]
PHOSPHATE SERPL-MCNC: 3.6 MG/DL (ref 2.3–4.1)
PLATELET # BLD AUTO: 166 THOUSANDS/UL (ref 149–390)
PMV BLD AUTO: 13.4 FL (ref 8.9–12.7)
POTASSIUM SERPL-SCNC: 4.1 MMOL/L (ref 3.5–5.3)
PROT UR STRIP-MCNC: NEGATIVE MG/DL
PROT UR-MCNC: 17 MG/DL
PROT/CREAT UR: 0.22 MG/G{CREAT} (ref 0–0.1)
PTH-INTACT SERPL-MCNC: 126.6 PG/ML (ref 18.4–80.1)
RBC # BLD AUTO: 3.89 MILLION/UL (ref 3.81–5.12)
RBC #/AREA URNS AUTO: ABNORMAL /HPF
SODIUM SERPL-SCNC: 136 MMOL/L (ref 135–147)
SP GR UR STRIP.AUTO: 1.01 (ref 1–1.03)
UROBILINOGEN UR STRIP-ACNC: <2 MG/DL
WBC # BLD AUTO: 8.38 THOUSAND/UL (ref 4.31–10.16)
WBC #/AREA URNS AUTO: ABNORMAL /HPF

## 2023-04-04 NOTE — PROGRESS NOTES
Assessment/Plan:  Problem List Items Addressed This Visit        Cardiovascular and Mediastinum    Hypertension - Primary     The patient's blood pressure is stable on her current medication  We have made no changes today  She will continue with her current medications  She will continue to work on improving her diet and exercise  We will see her back in the office as scheduled  Nervous and Auditory    Acute otitis externa of right ear     The patient has findings consistent with an otitis externa  We will treat her with the eardrops as ordered  We will see her back in the office as scheduled  Relevant Medications    neomycin-polymyxin-hydrocortisone (CORTISPORIN) otic solution    Bilateral impacted cerumen     Ear lavage was performed successfully on the left ear in the office  The cerumen was removed in entirety  It is partially removed from the right ear and the patient will follow-up in the office in a week to remove the rest of it after using the drops for a week  We will see her back as scheduled  Relevant Orders    Ear cerumen removal       Genitourinary    Stage 3a chronic kidney disease ([facility] [address] )     Lab Results   Component Value Date    EGFR 51 04/04/2023    EGFR 51 11/22/2022    EGFR 40 09/29/2022    CREATININE 1 02 04/04/2023    CREATININE 1 02 11/22/2022    CREATININE 1 23 09/29/2022   The patient will follow up with nephrology as scheduled  She will continue to maintain adequate hydration and will continue to avoid nephrotoxic agents  We will see her back as scheduled  Return in about 1 week (around 4/11/2023) for recheck ear  I spent 20 minutes during the visit reviewing the history from the patient, performing the examination, discussing the findings with the patient, providing counseling and education, and making a plan  I spent 10 minutes ordering referrals and testing and documenting      Subjective:   Chief Complaint   Patient presents with   • Follow-up     6 month follow-up   • Ear Fullness     Ear lavage, left ear more but both are full        Patient ID: Patti Boucher is a 80 y o  female presents today for routine checkup and to evaluate both of your ears feeling full  Ryanne Álvarez is a 80 y o  female who presents today for a checkup of her hypertension and also for a probable ear lavage  Both ears feel blocked  She has been having some mild allergy symptoms as well  She is doing well on her medication  She is watching her diet  Both of her ears are full - the left is worse  The patient denies any chest pain, shortness of breath, or palpitations  There is no edema  There are no headaches or visual changes  There is no lightheadedness, dizziness, or fainting spells  The patient currently denies any nausea, vomiting, or GERD symptoms  she has normal bowel movements and normal urine output  she has a normal appetite  There is PND  She take Claritin on and off  She does not need refills  Hypertension  This is a chronic problem  The current episode started more than 1 year ago  The problem is unchanged  The problem is controlled  Pertinent negatives include no anxiety, blurred vision, chest pain, headaches, malaise/fatigue, neck pain, orthopnea, palpitations, peripheral edema, PND, shortness of breath or sweats  The current treatment provides significant improvement       The following portions of the patient's history were reviewed and updated as appropriate: allergies, current medications, past family history, past medical history, past social history, past surgical history and problem list   Patient Active Problem List   Diagnosis   • Allergic rhinitis   • Cardiomyopathy ([facility] [address] )   • CHF (congestive heart failure) ([facility] )   • Cough, persistent   • GERD (gastroesophageal reflux disease)   • Hypertension   • Osteoporosis   • TMJ syndrome   • Positive 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 disease   • AICD (automatic cardioverter/defibrillator) present   • Other specified glaucoma   • Bilateral impacted cerumen   • Primary osteoarthritis of right knee   • Environmental and seasonal allergies   • Chronic pain of left knee   • Primary osteoarthritis of left knee   • Leg length discrepancy   • Tricuspid regurgitation   • PVC's (premature ventricular contractions)   • Stage 3a chronic kidney disease (HCC)   • Vitamin D deficiency   • Vasomotor rhinitis   • Secondary hyperparathyroidism of renal origin ([facility] )   • Acute otitis externa of right ear     Past Medical History:   Diagnosis Date   • Acute otitis externa of left ear 4/12/2022   • Cardiomyopathy ([facility] )    • Chronic diarrhea of unknown origin     resolved 12/22/2015   • Contact dermatitis     last assessed 04/19/2013   • Esophageal reflux     resolved 11/35/8348   • Helicobacter pylori gastrointestinal tract infection 10/25/2018   • Hyperlipidemia     resolved 12/22/2015   • Muscle weakness (generalized)     resolved 12/22/2015   • Pacemaker    • Post-viral cough syndrome 12/12/2022   • Reactive airway disease     resolved 12/22/2015     Past Surgical History:   Procedure Laterality Date   • LEG SURGERY Left    • OTHER SURGICAL HISTORY      open treatment of weight bearing distal tibial fracture     No Known Allergies  Family History   Problem Relation Age of Onset   • Heart disease Father    • Diabetes Daughter    • Mental illness Daughter         disorder     Social History     Socioeconomic History   • Marital status:       Spouse name: Not on file   • Number of children: Not on file   • Years of education: Not on file   • Highest education level: Not on file   Occupational History   • Occupation: retired   Tobacco Use   • Smoking status: Never   • Smokeless tobacco: Never   Vaping Use   • Vaping Use: Never used   Substance and Sexual Activity   • Alcohol use: No     Comment: denies alcohol use causing problems   • Drug use: No   • Sexual activity: Not on file   Other Topics Concern   • Not on file   Social History Narrative      Social Determinants of Health     Financial Resource Strain: Not on file   Food Insecurity: Not on file   Transportation Needs: Not on file   Physical Activity: Insufficiently Active   • Days of Exercise per Week: 2 days   • Minutes of Exercise per Session: 30 min   Stress: No Stress Concern Present   • Feeling of Stress :  Only a little   Social Connections: Not on file   Intimate Partner Violence: Not At Risk   • Fear of Current or Ex-Partner: No   • Emotionally Abused: No   • Physically Abused: No   • Sexually Abused: No   Housing Stability: Not on file     Current Outpatient Medications on File Prior to Visit   Medication Sig Dispense Refill   • amiodarone 200 mg tablet Take 0 5 tablets (100 mg total) by mouth daily 45 tablet 3   • aspirin 81 MG tablet Take 1 tablet by mouth daily     • brimonidine tartrate 0 2 % ophthalmic solution 1 drop 3 (three) times a day     • Calcium Carbonate-Vitamin D 600-5 MG-MCG TABS every 24 hours     • Cholecalciferol (Vitamin D3) 25 MCG (1000 UT) CAPS every 24 hours     • denosumab (Prolia) 60 mg/mL as directed     • furosemide (LASIX) 40 mg tablet Take 40 mg by mouth daily       • latanoprost (XALATAN) 0 005 % ophthalmic solution      • loratadine (CLARITIN) 10 mg tablet Take 1 tablet (10 mg total) by mouth daily 30 tablet 3   • losartan (COZAAR) 100 MG tablet Take 1 tablet (100 mg total) by mouth every 24 hours 90 tablet 2   • metoprolol succinate (TOPROL-XL) 25 mg 24 hr tablet Take 1 tablet (25 mg total) by mouth daily 90 tablet 2   • Multiple Vitamin (DAILY VALUE MULTIVITAMIN) TABS Take 1 tablet by mouth daily     • [DISCONTINUED] azelastine (ASTELIN) 0 1 % nasal spray 1 spray into each nostril 2 (two) times a day Use in each nostril as directed (Patient not taking: Reported on 4/4/2023) 30 mL 5   • [DISCONTINUED] benzonatate (TESSALON PERLES) 100 mg capsule Take 1 capsule (100 mg total) by mouth 3 "(three) times a day as needed for cough (Patient not taking: Reported on 3/29/2023) 20 capsule 0     No current facility-administered medications on file prior to visit  Review of Systems   Constitutional: Negative  Negative for malaise/fatigue  HENT: Positive for ear pain and hearing loss  Eyes: Negative  Negative for blurred vision  Respiratory: Negative  Negative for shortness of breath  Cardiovascular: Negative  Negative for chest pain, palpitations, orthopnea and PND  Gastrointestinal: Negative  Endocrine: Negative  Genitourinary: Negative  Musculoskeletal: Negative  Negative for neck pain  Skin: Negative  Allergic/Immunologic: Negative  Neurological: Negative  Negative for headaches  Hematological: Negative  Psychiatric/Behavioral: Negative  Objective:  Vitals:    04/04/23 1209   BP: 118/72   BP Location: Left arm   Patient Position: Sitting   Cuff Size: Standard   Pulse: 62   Resp: 18   Temp: 97 8 °F (36 6 °C)   TempSrc: Tympanic   SpO2: 96%   Weight: 54 1 kg (119 lb 3 2 oz)   Height: 5' 4\" (1 626 m)     Body mass index is 20 46 kg/m²  Physical Exam  Constitutional:       Appearance: She is well-developed  HENT:      Head: Normocephalic and atraumatic  Right Ear: Tympanic membrane and ear canal normal  There is impacted cerumen  Left Ear: Tympanic membrane and ear canal normal  There is impacted cerumen  Mouth/Throat:      Pharynx: No oropharyngeal exudate  Eyes:      Conjunctiva/sclera: Conjunctivae normal       Pupils: Pupils are equal, round, and reactive to light  Neck:      Thyroid: No thyromegaly  Vascular: No JVD  Trachea: No tracheal deviation  Cardiovascular:      Rate and Rhythm: Normal rate and regular rhythm  Heart sounds: Normal heart sounds  No murmur heard  No friction rub  No gallop  Pulmonary:      Effort: Pulmonary effort is normal  No respiratory distress        Breath sounds: Normal breath " "sounds  No stridor  No wheezing or rales  Chest:      Chest wall: No tenderness  Abdominal:      General: Bowel sounds are normal  There is no distension  Palpations: Abdomen is soft  There is no mass  Tenderness: There is no abdominal tenderness  There is no guarding or rebound  Musculoskeletal:         General: No tenderness or deformity  Normal range of motion  Cervical back: Normal range of motion  Lymphadenopathy:      Cervical: No cervical adenopathy  Skin:     General: Skin is warm and dry  Neurological:      Mental Status: She is alert and oriented to person, place, and time  Cranial Nerves: No cranial nerve deficit  Motor: No abnormal muscle tone  Coordination: Coordination normal       Deep Tendon Reflexes: Reflexes are normal and symmetric  Reflexes normal          Ear cerumen removal    Date/Time: 4/4/2023 12:00 PM  Performed by: Shanel Adame DO  Authorized by: Shanle Adame DO   Universal Protocol:  Consent: Verbal consent obtained  Written consent not obtained  Risks and benefits: risks, benefits and alternatives were discussed  Consent given by: patient  Time out: Immediately prior to procedure a \"time out\" was called to verify the correct patient, procedure, equipment, support staff and site/side marked as required    Timeout called at: 4/4/2023 12:10 PM   Patient understanding: patient states understanding of the procedure being performed  Patient consent: the patient's understanding of the procedure matches consent given  Patient identity confirmed: verbally with patient      Patient location:  Clinic  Procedure details:     Local anesthetic:  None    Location:  L ear and R ear    Procedure type: irrigation with instrumentation      Instrumentation: loop      Approach:  External    Visualization (free text):  Cerumen impaction both ears    Equipment used:  Ear lavage and lighted cerumen loop  Post-procedure details:     Complication:  None    " Hearing quality:  Improved    Patient tolerance of procedure: Tolerated well, no immediate complications  Comments: The cerumen was removed in entirety from the left ear and the TM was visualized and clear  There was partial removal of the cerumen from the right ear  We were unable to remove all of the cerumen  There is mild irritation of the external auditory canal   We will treat the patient with the Cortisporin otic drops as ordered and we will bring her back in a week for recheck and to remove the rest of the cerumen  Depression Screening and Follow-up Plan: Patient was screened for depression during today's encounter  They screened negative with a PHQ-2 score of 0  Falls Plan of Care: balance, strength, and gait training instructions were provided  Recommended assistive device to help with gait and balance

## 2023-04-05 ENCOUNTER — TELEPHONE (OUTPATIENT)
Dept: NEPHROLOGY | Facility: CLINIC | Age: 83
End: 2023-04-05

## 2023-04-05 DIAGNOSIS — N25.81 SECONDARY HYPERPARATHYROIDISM OF RENAL ORIGIN (HCC): ICD-10-CM

## 2023-04-05 DIAGNOSIS — E55.9 VITAMIN D DEFICIENCY: Primary | ICD-10-CM

## 2023-04-05 LAB
25(OH)D3 SERPL-MCNC: 25.5 NG/ML (ref 30–100)
ALBUMIN SERPL ELPH-MCNC: 3.93 G/DL (ref 3.5–5)
ALBUMIN SERPL ELPH-MCNC: 49.1 % (ref 52–65)
ALBUMIN UR ELPH-MCNC: 100 %
ALPHA1 GLOB MFR UR ELPH: 0 %
ALPHA1 GLOB SERPL ELPH-MCNC: 0.32 G/DL (ref 0.1–0.4)
ALPHA1 GLOB SERPL ELPH-MCNC: 4 % (ref 2.5–5)
ALPHA2 GLOB MFR UR ELPH: 0 %
ALPHA2 GLOB SERPL ELPH-MCNC: 0.9 G/DL (ref 0.4–1.2)
ALPHA2 GLOB SERPL ELPH-MCNC: 11.2 % (ref 7–13)
B-GLOBULIN MFR UR ELPH: 0 %
BETA GLOB ABNORMAL SERPL ELPH-MCNC: 0.42 G/DL (ref 0.4–0.8)
BETA1 GLOB SERPL ELPH-MCNC: 5.3 % (ref 5–13)
BETA2 GLOB SERPL ELPH-MCNC: 4.2 % (ref 2–8)
BETA2+GAMMA GLOB SERPL ELPH-MCNC: 0.34 G/DL (ref 0.2–0.5)
GAMMA GLOB ABNORMAL SERPL ELPH-MCNC: 2.1 G/DL (ref 0.5–1.6)
GAMMA GLOB MFR UR ELPH: 0 %
GAMMA GLOB SERPL ELPH-MCNC: 26.2 % (ref 12–22)
IGG/ALB SER: 0.96 {RATIO} (ref 1.1–1.8)
INTERPRETATION UR IFE-IMP: NORMAL
PROT PATTERN SERPL ELPH-IMP: ABNORMAL
PROT PATTERN UR ELPH-IMP: NORMAL
PROT SERPL-MCNC: 8 G/DL (ref 6.4–8.2)
PROT UR-MCNC: 7 MG/DL

## 2023-04-05 RX ORDER — ERGOCALCIFEROL 1.25 MG/1
50000 CAPSULE ORAL WEEKLY
Qty: 12 CAPSULE | Refills: 0 | Status: SHIPPED | OUTPATIENT
Start: 2023-04-05

## 2023-04-05 NOTE — ASSESSMENT & PLAN NOTE
The patient's blood pressure is stable on her current medication  We have made no changes today  She will continue with her current medications  She will continue to work on improving her diet and exercise  We will see her back in the office as scheduled

## 2023-04-05 NOTE — TELEPHONE ENCOUNTER
Spoke to patient with regards to most recent lab work from 4/4/2023 creatinine stable at baseline, low vitamin D levels with rising PTH levels advised patient to hold current home vitamin D and start on ergocalciferol 50,000 units p o  q  weekly for 12 weeks then she can return back to her original vitamin D supplementation after she completes the prescription  Advised the patient to test results are still pending if anything abnormal comes back in them I will be in touch with her  All questions and concerns answered

## 2023-04-05 NOTE — ASSESSMENT & PLAN NOTE
Lab Results   Component Value Date    EGFR 51 04/04/2023    EGFR 51 11/22/2022    EGFR 40 09/29/2022    CREATININE 1 02 04/04/2023    CREATININE 1 02 11/22/2022    CREATININE 1 23 09/29/2022   The patient will follow up with nephrology as scheduled  She will continue to maintain adequate hydration and will continue to avoid nephrotoxic agents  We will see her back as scheduled

## 2023-04-05 NOTE — ASSESSMENT & PLAN NOTE
The patient has findings consistent with an otitis externa  We will treat her with the eardrops as ordered  We will see her back in the office as scheduled

## 2023-04-05 NOTE — ASSESSMENT & PLAN NOTE
Ear lavage was performed successfully on the left ear in the office  The cerumen was removed in entirety  It is partially removed from the right ear and the patient will follow-up in the office in a week to remove the rest of it after using the drops for a week  We will see her back as scheduled

## 2023-04-06 LAB
KAPPA LC FREE SER-MCNC: 74 MG/L (ref 3.3–19.4)
KAPPA LC FREE/LAMBDA FREE SER: 3 {RATIO} (ref 0.26–1.65)
LAMBDA LC FREE SERPL-MCNC: 24.7 MG/L (ref 5.7–26.3)

## 2023-04-14 PROBLEM — H61.21 IMPACTED CERUMEN OF RIGHT EAR: Status: ACTIVE | Noted: 2022-04-12

## 2023-04-23 NOTE — PROGRESS NOTES
Hematology/Oncology Outpatient Consult Note  Fanny Ayala 80 y o  female MRN: @ Encounter: [id number]        Date:  4/27/2023        CC:  Elevated serum free light chain      HPI:  Fanny Ayala is an 68-year-old female with a history of hypertension, GERD, osteoarthritis, nonischemic cardiomyopathy with ICD in place, CKD stage III who was noted to have elevated serum free light chain ratio during recent nephrology work-up  She is referred to hematology for further evaluation and is being seen for initial consultation 4/27/2023     Blood work on file reviewed  CBC without any evidence of anemia  Normal serum chemistries  Serum creatinine has fluctuated between 1 and 1 23  SPEP showed a possible faint band in the gamma region  However serum immunofixation was completed and was negative for any evidence of monoclonal gammopathy  UPEP negative for any monoclonal gammopathy  Ig Kappa free light chain is elevated at 74  Ig lambda light chain within normal limits  Free light chain ratio is mildly elevated at 3  Patient has evidence of osteoporosis on imaging  No evidence of lytic lesions    Patient presents today with her son  She states she is in her usual state of health  Denies any constitutional symptoms  She walks with a cane  She does report history of falls and fractures secondary to her osteoporosis  No recent fall or injury  Denies any chest pain, shortness of breath  Test Results:    Imaging: No results found      Labs:   Lab Results   Component Value Date    WBC 8 38 04/04/2023    HGB 11 8 04/04/2023    HCT 39 5 04/04/2023     (H) 04/04/2023     04/04/2023     Lab Results   Component Value Date     01/04/2016    K 4 1 04/04/2023     04/04/2023    CO2 27 04/04/2023    ANIONGAP 6 01/04/2016    BUN 22 04/04/2023    CREATININE 1 02 04/04/2023    GLUCOSE 85 01/04/2016    GLUF 100 (H) 11/22/2022    CALCIUM 9 3 04/04/2023    CORRECTEDCA 10 0 09/29/2022    AST 20 11/22/2022    ALT 21 11/22/2022    ALKPHOS 54 11/22/2022    PROT 7 3 09/21/2015    BILITOT 0 30 09/21/2015    EGFR 51 04/04/2023             ROS:  Review of Systems   Musculoskeletal: Positive for arthralgias and gait problem (Uses a cane)  All other systems reviewed and are negative            Active Problems:   Patient Active Problem List   Diagnosis   • Allergic rhinitis   • Cardiomyopathy ([facility] )   • CHF (congestive heart failure) ([facility])   • Cough, persistent   • GERD (gastroesophageal reflux disease)   • Hypertension   • Osteoporosis   • TMJ syndrome   • Positive 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 disease   • AICD (automatic cardioverter/defibrillator) present   • Other specified glaucoma   • Impacted cerumen of right ear   • Primary osteoarthritis of right knee   • Environmental and seasonal allergies   • Chronic pain of left knee   • Primary osteoarthritis of left knee   • Leg length discrepancy   • Tricuspid regurgitation   • PVC's (premature ventricular contractions)   • Stage 3a chronic kidney disease ([facility])   • Vitamin D deficiency   • Vasomotor rhinitis   • Secondary hyperparathyroidism of renal origin ([facility] )   • Acute otitis externa of right ear       Past Medical History:   Past Medical History:   Diagnosis Date   • Acute otitis externa of left ear 4/12/2022   • Cardiomyopathy ([facility] )    • Chronic diarrhea of unknown origin     resolved 12/22/2015   • Contact dermatitis     last assessed 04/19/2013   • Esophageal reflux     resolved 04/14/2354   • Helicobacter pylori gastrointestinal tract infection 10/25/2018   • Hyperlipidemia     resolved 12/22/2015   • Muscle weakness (generalized)     resolved 12/22/2015   • Pacemaker    • Post-viral cough syndrome 12/12/2022   • Reactive airway disease     resolved 12/22/2015       Surgical History:   Past Surgical History:   Procedure Laterality Date   • LEG SURGERY Left    • OTHER SURGICAL HISTORY      open treatment of weight bearing distal tibial fracture Family History:    Family History   Problem Relation Age of Onset   • Heart disease Father    • Diabetes Daughter    • Mental illness Daughter         disorder       Cancer-related family history is not on file  Social History:   Social History     Socioeconomic History   • Marital status:      Spouse name: Not on file   • Number of children: Not on file   • Years of education: Not on file   • Highest education level: Not on file   Occupational History   • Occupation: retired   Tobacco Use   • Smoking status: Never   • Smokeless tobacco: Never   Vaping Use   • Vaping Use: Never used   Substance and Sexual Activity   • Alcohol use: No     Comment: denies alcohol use causing problems   • Drug use: No   • Sexual activity: Not on file   Other Topics Concern   • Not on file   Social History Narrative      Social Determinants of Health     Financial Resource Strain: Not on file   Food Insecurity: Not on file   Transportation Needs: Not on file   Physical Activity: Insufficiently Active   • Days of Exercise per Week: 2 days   • Minutes of Exercise per Session: 30 min   Stress: No Stress Concern Present   • Feeling of Stress :  Only a little   Social Connections: Not on file   Intimate Partner Violence: Not At Risk   • Fear of Current or Ex-Partner: No   • Emotionally Abused: No   • Physically Abused: No   • Sexually Abused: No   Housing Stability: Not on file       Current Medications:   Current Outpatient Medications   Medication Sig Dispense Refill   • amiodarone 200 mg tablet Take 0 5 tablets (100 mg total) by mouth daily 45 tablet 3   • aspirin 81 MG tablet Take 1 tablet by mouth daily     • brimonidine tartrate 0 2 % ophthalmic solution 1 drop 3 (three) times a day     • Calcium Carbonate-Vitamin D 600-5 MG-MCG TABS every 24 hours     • denosumab (Prolia) 60 mg/mL as directed     • ergocalciferol (ERGOCALCIFEROL) 1 25 MG (65943 UT) capsule Take 1 capsule (50,000 Units total) by mouth once a week 12 capsule 0   • furosemide (LASIX) 40 mg tablet Take 40 mg by mouth daily       • latanoprost (XALATAN) 0 005 % ophthalmic solution      • loratadine (CLARITIN) 10 mg tablet Take 1 tablet (10 mg total) by mouth daily 30 tablet 3   • losartan (COZAAR) 100 MG tablet Take 1 tablet (100 mg total) by mouth every 24 hours 90 tablet 2   • metoprolol succinate (TOPROL-XL) 25 mg 24 hr tablet Take 1 tablet (25 mg total) by mouth daily 90 tablet 2   • Multiple Vitamin (DAILY VALUE MULTIVITAMIN) TABS Take 1 tablet by mouth daily (Patient not taking: Reported on 4/27/2023)     • neomycin-polymyxin-hydrocortisone (CORTISPORIN) otic solution Administer 3 drops to the right ear 4 (four) times a day for 7 days (Patient not taking: Reported on 4/13/2023) 10 mL 0     No current facility-administered medications for this visit  Allergies: No Known Allergies      Physical Exam:    Body surface area is 1 56 meters squared  Wt Readings from Last 3 Encounters:   04/27/23 53 5 kg (118 lb)   04/13/23 53 kg (116 lb 12 8 oz)   04/04/23 54 1 kg (119 lb 3 2 oz)        Temp Readings from Last 3 Encounters:   04/13/23 99 2 °F (37 3 °C) (Tympanic)   04/04/23 97 8 °F (36 6 °C) (Tympanic)   12/07/22 98 °F (36 7 °C)        BP Readings from Last 3 Encounters:   04/13/23 130/80   04/04/23 118/72   03/29/23 132/74         Pulse Readings from Last 3 Encounters:   04/13/23 68   04/04/23 62   12/07/22 72          Physical Exam  Constitutional:       General: She is not in acute distress  Appearance: She is not toxic-appearing  Comments: Alert, pleasant, elderly female  No acute distress  HENT:      Head: Normocephalic and atraumatic  Mouth/Throat:      Pharynx: No oropharyngeal exudate  Eyes:      General: No scleral icterus  Cardiovascular:      Rate and Rhythm: Normal rate and regular rhythm  Pulmonary:      Effort: Pulmonary effort is normal       Breath sounds: Normal breath sounds     Abdominal:      General: Bowel "sounds are normal       Palpations: Abdomen is soft  Musculoskeletal:         General: Normal range of motion  Right lower leg: No edema  Left lower leg: No edema  Skin:     General: Skin is warm and dry  Neurological:      General: No focal deficit present  Mental Status: She is alert and oriented to person, place, and time  Psychiatric:         Mood and Affect: Mood normal          Behavior: Behavior normal        Assessment/ Plan:    1  Elevated serum immunoglobulin free light chain level      Patient is referred to hematology for elevated serum free light chain ratio 3 in the setting of chronic kidney disease  She has no evidence of endorgan damage  No anemia, worsening renal insufficiency, hypercalcemia or lytic lesions  No evidence of monoclonal gammopathy on SPEP or UPEP  I discussed with patient and her son that with abnormal free light chain ratio without abnormalities on SPEP the recommendation is to follow these lab test at least once yearly  I would like to see her back in 6 months with repeat blood work including full myeloma labs and yearly screenings can start afterwards if needed  She will continue to follow closely with her PCP and nephrologist    Patient and her son were in agreement with this plan of care and appreciative of time spent explaining blood work today  They are instructed to call anytime with any additional questions or concerns    Barriers: None  Patient  able to self care  Portions of the record may have been created with voice recognition software  Occasional wrong word or \"sound a like\" substitutions may have occurred due to the inherent limitations of voice recognition software  Read the chart carefully and recognize, using context, where substitutions have occurred            "

## 2023-04-26 ENCOUNTER — TELEPHONE (OUTPATIENT)
Dept: HEMATOLOGY ONCOLOGY | Facility: CLINIC | Age: 83
End: 2023-04-26

## 2023-04-26 NOTE — TELEPHONE ENCOUNTER
Appointment Confirmation   Who are you speaking with? Patient   If it is not the patient, are they listed on an active communication consent form? N/A   Which provider is the appointment scheduled with? CARA Alfaro   When is the appointment scheduled? Please list date and time 4/27/23 1:00PM   At which location is the appointment scheduled to take place? [place]   Did caller verbalize understanding of appointment details?  Yes

## 2023-04-27 ENCOUNTER — CONSULT (OUTPATIENT)
Age: 83
End: 2023-04-27

## 2023-04-27 ENCOUNTER — OFFICE VISIT (OUTPATIENT)
Dept: OBGYN CLINIC | Facility: CLINIC | Age: 83
End: 2023-04-27

## 2023-04-27 ENCOUNTER — TELEPHONE (OUTPATIENT)
Dept: HEMATOLOGY ONCOLOGY | Facility: CLINIC | Age: 83
End: 2023-04-27

## 2023-04-27 ENCOUNTER — TELEPHONE (OUTPATIENT)
Dept: OTHER | Facility: OTHER | Age: 83
End: 2023-04-27

## 2023-04-27 VITALS
SYSTOLIC BLOOD PRESSURE: 140 MMHG | DIASTOLIC BLOOD PRESSURE: 80 MMHG | RESPIRATION RATE: 16 BRPM | HEART RATE: 96 BPM | OXYGEN SATURATION: 98 % | BODY MASS INDEX: 20.14 KG/M2 | TEMPERATURE: 98 F | WEIGHT: 118 LBS | HEIGHT: 64 IN

## 2023-04-27 DIAGNOSIS — R76.8 ELEVATED SERUM IMMUNOGLOBULIN FREE LIGHT CHAIN LEVEL: Primary | ICD-10-CM

## 2023-04-27 DIAGNOSIS — M17.11 PRIMARY OSTEOARTHRITIS OF RIGHT KNEE: Primary | ICD-10-CM

## 2023-04-27 RX ORDER — BETAMETHASONE SODIUM PHOSPHATE AND BETAMETHASONE ACETATE 3; 3 MG/ML; MG/ML
6 INJECTION, SUSPENSION INTRA-ARTICULAR; INTRALESIONAL; INTRAMUSCULAR; SOFT TISSUE
Status: COMPLETED | OUTPATIENT
Start: 2023-04-27 | End: 2023-04-27

## 2023-04-27 RX ORDER — ROPIVACAINE HYDROCHLORIDE 5 MG/ML
10 INJECTION, SOLUTION EPIDURAL; INFILTRATION; PERINEURAL
Status: COMPLETED | OUTPATIENT
Start: 2023-04-27 | End: 2023-04-27

## 2023-04-27 RX ADMIN — BETAMETHASONE SODIUM PHOSPHATE AND BETAMETHASONE ACETATE 6 MG: 3; 3 INJECTION, SUSPENSION INTRA-ARTICULAR; INTRALESIONAL; INTRAMUSCULAR; SOFT TISSUE at 11:45

## 2023-04-27 RX ADMIN — ROPIVACAINE HYDROCHLORIDE 10 ML: 5 INJECTION, SOLUTION EPIDURAL; INFILTRATION; PERINEURAL at 11:45

## 2023-04-27 NOTE — ASSESSMENT & PLAN NOTE
Findings today are consistent with right knee osteoarthritis  Imaging and prognosis was reviewed with the patient today  Cortisone steroid injection was administered today  Patient should avoid strenuous activities for the next 1-2 days  Patient should avoid vaccines for the next 2 weeks if possible  Can apply cold compress for soreness  If patient feels relief with the cortisone injections, procedure can be repeated every 3 months  Follow up as needed  All patient's questions were answered to her satisfaction  This note is created using dictation transcription  It may contain typographical errors, grammatical errors, improperly dictated words, background noise and other errors

## 2023-04-27 NOTE — TELEPHONE ENCOUNTER
Patient Call    Who are you speaking with? Patient    If it is not the patient, are they listed on an active communication consent form? N/A   What is the reason for this call? Patient calling in wanting to know how soon Gabriel Daniels would like her to go for the lab work  Does this require a call back? Yes   If a call back is required, please list best call back number [phone]   If a call back is required, advise that a message will be forwarded to their care team and someone will return their call as soon as possible  Did you relay this information to the patient?  Yes

## 2023-04-27 NOTE — TELEPHONE ENCOUNTER
The patient would like to schedule her 3 months follow up appointment for her injection       Please contact

## 2023-04-27 NOTE — PROGRESS NOTES
Assessment:     1  Primary osteoarthritis of right knee        Plan:     Problem List Items Addressed This Visit        Musculoskeletal and Integument    Primary osteoarthritis of right knee - Primary     Findings today are consistent with right knee osteoarthritis  Imaging and prognosis was reviewed with the patient today  Cortisone steroid injection was administered today  Patient should avoid strenuous activities for the next 1-2 days  Patient should avoid vaccines for the next 2 weeks if possible  Can apply cold compress for soreness  If patient feels relief with the cortisone injections, procedure can be repeated every 3 months  Follow up as needed  All patient's questions were answered to her satisfaction  This note is created using dictation transcription  It may contain typographical errors, grammatical errors, improperly dictated words, background noise and other errors  Relevant Medications    betamethasone acetate-betamethasone sodium phosphate (CELESTONE) injection 6 mg (Completed)    ropivacaine (NAROPIN) 0 5 % injection 10 mL (Completed)    Other Relevant Orders    Large joint arthrocentesis: R knee (Completed)       Subjective:     Patient ID: Fanny Ayala is a 80 y o  female  Chief Complaint:  80 y o  female presents to the office for follow up of right knee osteoarthritis  At last visit she received a cortisone injection to the right knee which was mildly beneficial for a few weeks  She notes her pain was brought down to a 7/10  She has had worsening symptoms over the past 4 weeks  She has been wearing a knee brace on the right  She is interested in repeating a cortisone injection today in the office for some relief for her upcoming day trip  She is accompanied by her son today in the office  She also notes left knee pain with history of ORIF  Patient had tried Visco supplement injection in the past which also did not provide her with long-term relief       Allergy:  No Known Allergies  Medications:  all current active meds have been reviewed  Past Medical History:  Past Medical History:   Diagnosis Date   • Acute otitis externa of left ear 4/12/2022   • Cardiomyopathy ([address] )    • Chronic diarrhea of unknown origin     resolved 12/22/2015   • Contact dermatitis     last assessed 04/19/2013   • Esophageal reflux     resolved 22/72/9316   • Helicobacter pylori gastrointestinal tract infection 10/25/2018   • Hyperlipidemia     resolved 12/22/2015   • Muscle weakness (generalized)     resolved 12/22/2015   • Pacemaker    • Post-viral cough syndrome 12/12/2022   • Reactive airway disease     resolved 12/22/2015     Past Surgical History:  Past Surgical History:   Procedure Laterality Date   • LEG SURGERY Left    • OTHER SURGICAL HISTORY      open treatment of weight bearing distal tibial fracture     Family History:  Family History   Problem Relation Age of Onset   • Heart disease Father    • Diabetes Daughter    • Mental illness Daughter         disorder     Social History:  Social History     Substance and Sexual Activity   Alcohol Use No    Comment: denies alcohol use causing problems     Social History     Substance and Sexual Activity   Drug Use No     Social History     Tobacco Use   Smoking Status Never   Smokeless Tobacco Never     Review of Systems   Constitutional: Negative for chills and fever  HENT: Negative for drooling and sneezing  Eyes: Negative for redness  Respiratory: Negative for cough and wheezing  Gastrointestinal: Negative for nausea and vomiting  Endocrine: Negative  Genitourinary: Negative  Musculoskeletal: Positive for arthralgias (right knee), back pain (Antalgic), gait problem (Late with a cane) and joint swelling (Right knee)  Psychiatric/Behavioral: Negative for behavioral problems  The patient is not nervous/anxious            Objective:  BP Readings from Last 1 Encounters:   04/27/23 140/80      Wt Readings from Last 1 Encounters:   04/27/23 "53 5 kg (118 lb)      BMI:   Estimated body mass index is 20 25 kg/m² as calculated from the following:    Height as of an earlier encounter on 4/27/23: 5' 4\" (1 626 m)  Weight as of an earlier encounter on 4/27/23: 53 5 kg (118 lb)  BSA:   Estimated body surface area is 1 56 meters squared as calculated from the following:    Height as of an earlier encounter on 4/27/23: 5' 4\" (1 626 m)  Weight as of an earlier encounter on 4/27/23: 53 5 kg (118 lb)  Physical Exam  Vitals and nursing note reviewed  Constitutional:       Appearance: Normal appearance  She is well-developed  HENT:      Head: Normocephalic and atraumatic  Right Ear: External ear normal       Left Ear: External ear normal    Eyes:      Extraocular Movements: Extraocular movements intact  Conjunctiva/sclera: Conjunctivae normal    Neck:      Trachea: No tracheal deviation  Pulmonary:      Effort: Pulmonary effort is normal    Musculoskeletal:      Cervical back: Neck supple  Right knee: No effusion  Skin:     General: Skin is warm and dry  Neurological:      Mental Status: She is alert and oriented to person, place, and time  Deep Tendon Reflexes: Reflexes are normal and symmetric  Psychiatric:         Mood and Affect: Mood normal          Behavior: Behavior normal        Right Knee Exam     Tenderness   The patient is experiencing tenderness in the medial joint line  Range of Motion   Extension: normal   Flexion: 110     Tests   Varus: negative Valgus: negative  Patellar apprehension: negative    Other   Erythema: absent  Sensation: normal  Pulse: present  Swelling: none  Effusion: no effusion present    Comments:  Varus alignment  Patellofemoral joint crepitation with knee motion            No new images to review today      Large joint arthrocentesis: R knee  Universal Protocol:  Consent: Verbal consent obtained    Risks and benefits: risks, benefits and alternatives were discussed  Consent given by: " patient  Patient understanding: patient states understanding of the procedure being performed  Site marked: the operative site was marked  Patient identity confirmed: verbally with patient    Supporting Documentation  Indications: pain   Procedure Details  Location: knee - R knee  Preparation: Patient was prepped and draped in the usual sterile fashion  Needle size: 22 G  Ultrasound guidance: no  Approach: anterolateral  Medications administered: 6 mg betamethasone acetate-betamethasone sodium phosphate 6 (3-3) mg/mL; 10 mL ropivacaine 0 5 %    Patient tolerance: patient tolerated the procedure well with no immediate complications  Dressing:  Sterile dressing applied        Scribe Attestation    I,:  Oscar Munguia am acting as a scribe while in the presence of the attending physician :       I,:  Asberry Phoenix, MD personally performed the services described in this documentation    as scribed in my presence :

## 2023-06-03 PROBLEM — H60.501 ACUTE OTITIS EXTERNA OF RIGHT EAR: Status: RESOLVED | Noted: 2023-04-04 | Resolved: 2023-06-03

## 2023-06-13 PROBLEM — H61.21 IMPACTED CERUMEN OF RIGHT EAR: Status: RESOLVED | Noted: 2022-04-12 | Resolved: 2023-06-13

## 2023-06-28 ENCOUNTER — IN-CLINIC DEVICE VISIT (OUTPATIENT)
Dept: CARDIOLOGY CLINIC | Facility: CLINIC | Age: 83
End: 2023-06-28
Payer: MEDICARE

## 2023-06-28 DIAGNOSIS — Z95.810 AICD (AUTOMATIC CARDIOVERTER/DEFIBRILLATOR) PRESENT: Primary | ICD-10-CM

## 2023-06-28 PROCEDURE — 93284 PRGRMG EVAL IMPLANTABLE DFB: CPT | Performed by: INTERNAL MEDICINE

## 2023-06-29 NOTE — PROGRESS NOTES
Results for orders placed or performed in visit on 06/28/23   Cardiac EP device report    Narrative    [facility] [place] BI-V ICD - ACTIVE SYSTEM IS MRI CONDITIONAL  DEVICE INTERROGATED IN THE [place] OFFICE: BATTERY VOLTAGE ADEQUATE-4 YRS  AP 89% RV & %  ALL AVAILABLE LEAD PARAMETERS WITHIN NORMAL LIMITS  NO SIGNIFICANT HIGH RATE EPISODES  NO PROGRAMMING CHANGES MADE TO DEVICE PARAMETERS  NORMAL DEVICE FUNCTION   NC

## 2023-06-30 NOTE — PROGRESS NOTES
Patient follows in [facility]   Forward to [facility] for scheduling

## 2023-07-05 ENCOUNTER — TELEPHONE (OUTPATIENT)
Dept: CARDIOLOGY CLINIC | Facility: CLINIC | Age: 83
End: 2023-07-05

## 2023-07-05 NOTE — TELEPHONE ENCOUNTER
----- Message from Tim oWng sent at 6/30/2023 11:05 AM EDT -----        ----- Message -----  From: Ute Alexander  Sent: 6/29/2023   3:39 PM EDT  To: Cardiology [facility] [place]; #    Per Dr Pedro Pablo Gonzales request. Bienvenido Vaughn & thx u  ----- Message -----  From: Macie Love MD  Sent: 6/29/2023   7:53 AM EDT  To: Ute Alexander; Cardiology [place] [facility]    Thanks Latanya Ruggiero. Please arrange office visit with me or an advanced practitioner. Thank you  ----- Message -----  From: Ute Alexander  Sent: 6/29/2023   7:42 AM EDT  To: Macie Love MD; #    Pt c/o fatigue, nausea, sob, legs weak/tired. She believes it's from the medication but unsure which. Would like to discuss w/someone.

## 2023-07-17 ENCOUNTER — APPOINTMENT (OUTPATIENT)
Dept: LAB | Facility: CLINIC | Age: 83
End: 2023-07-17
Payer: MEDICARE

## 2023-07-17 DIAGNOSIS — I10 HYPERTENSION, ESSENTIAL: ICD-10-CM

## 2023-07-17 DIAGNOSIS — Z95.810 AUTOMATIC IMPLANTABLE CARDIAC DEFIBRILLATOR IN SITU: ICD-10-CM

## 2023-07-17 DIAGNOSIS — E55.9 VITAMIN D DEFICIENCY: ICD-10-CM

## 2023-07-17 DIAGNOSIS — N18.31 CHRONIC KIDNEY DISEASE (CKD) STAGE G3A/A1, MODERATELY DECREASED GLOMERULAR FILTRATION RATE (GFR) BETWEEN 45-59 ML/MIN/1.73 SQUARE METER AND ALBUMINURIA CREATININE RATIO LESS THAN 30 MG/G (HCC): ICD-10-CM

## 2023-07-17 DIAGNOSIS — I42.9 PRIMARY CARDIOMYOPATHY (HCC): ICD-10-CM

## 2023-07-17 DIAGNOSIS — I50.22 CHRONIC SYSTOLIC HEART FAILURE (HCC): ICD-10-CM

## 2023-07-17 DIAGNOSIS — N25.81 SECONDARY HYPERPARATHYROIDISM OF RENAL ORIGIN (HCC): ICD-10-CM

## 2023-07-17 LAB
25(OH)D3 SERPL-MCNC: 57.2 NG/ML (ref 30–100)
ALBUMIN SERPL BCP-MCNC: 3.9 G/DL (ref 3.5–5)
ANION GAP SERPL CALCULATED.3IONS-SCNC: 3 MMOL/L
BUN SERPL-MCNC: 18 MG/DL (ref 5–25)
CALCIUM SERPL-MCNC: 9.3 MG/DL (ref 8.3–10.1)
CHLORIDE SERPL-SCNC: 101 MMOL/L (ref 96–108)
CO2 SERPL-SCNC: 30 MMOL/L (ref 21–32)
CREAT SERPL-MCNC: 0.98 MG/DL (ref 0.6–1.3)
CREAT UR-MCNC: 94.8 MG/DL
GFR SERPL CREATININE-BSD FRML MDRD: 53 ML/MIN/1.73SQ M
GLUCOSE P FAST SERPL-MCNC: 104 MG/DL (ref 65–99)
MAGNESIUM SERPL-MCNC: 2.5 MG/DL (ref 1.6–2.6)
MICROALBUMIN UR-MCNC: 8.8 MG/L (ref 0–20)
MICROALBUMIN/CREAT 24H UR: 9 MG/G CREATININE (ref 0–30)
PHOSPHATE SERPL-MCNC: 3.1 MG/DL (ref 2.3–4.1)
POTASSIUM SERPL-SCNC: 4 MMOL/L (ref 3.5–5.3)
PTH-INTACT SERPL-MCNC: 169.8 PG/ML (ref 12–88)
SODIUM SERPL-SCNC: 134 MMOL/L (ref 135–147)

## 2023-07-27 ENCOUNTER — OFFICE VISIT (OUTPATIENT)
Dept: OBGYN CLINIC | Facility: CLINIC | Age: 83
End: 2023-07-27
Payer: MEDICARE

## 2023-07-27 VITALS
WEIGHT: 113 LBS | BODY MASS INDEX: 19.29 KG/M2 | DIASTOLIC BLOOD PRESSURE: 70 MMHG | HEIGHT: 64 IN | SYSTOLIC BLOOD PRESSURE: 120 MMHG

## 2023-07-27 DIAGNOSIS — M17.11 PRIMARY OSTEOARTHRITIS OF RIGHT KNEE: Primary | ICD-10-CM

## 2023-07-27 PROCEDURE — 20610 DRAIN/INJ JOINT/BURSA W/O US: CPT | Performed by: ORTHOPAEDIC SURGERY

## 2023-07-27 PROCEDURE — 99213 OFFICE O/P EST LOW 20 MIN: CPT | Performed by: ORTHOPAEDIC SURGERY

## 2023-07-27 RX ORDER — BETAMETHASONE SODIUM PHOSPHATE AND BETAMETHASONE ACETATE 3; 3 MG/ML; MG/ML
6 INJECTION, SUSPENSION INTRA-ARTICULAR; INTRALESIONAL; INTRAMUSCULAR; SOFT TISSUE
Status: COMPLETED | OUTPATIENT
Start: 2023-07-27 | End: 2023-07-27

## 2023-07-27 RX ADMIN — BETAMETHASONE SODIUM PHOSPHATE AND BETAMETHASONE ACETATE 6 MG: 3; 3 INJECTION, SUSPENSION INTRA-ARTICULAR; INTRALESIONAL; INTRAMUSCULAR; SOFT TISSUE at 10:45

## 2023-07-27 NOTE — ASSESSMENT & PLAN NOTE
Findings consistent with right knee severe medial osteoarthritis. Patient is not ready for right total knee replacement and wants to continue with conservative treatment for right knee. She was given cortisone injection, tolerated well, cold compress today. Continue with knee sleeve for support and 4 pt cane to ambulate. Tylenol as needed for pain along with voltaren gel or aspercreme. Stationary bike for low impact exercise. Repeat CSI in 3-4 months if needed. All patient's questions were answered to her satisfaction. This note is created using dictation transcription. It may contain typographical errors, grammatical errors, improperly dictated words, background noise and other errors.

## 2023-07-27 NOTE — PROGRESS NOTES
Assessment:     1. Primary osteoarthritis of right knee        Plan:     Problem List Items Addressed This Visit        Musculoskeletal and Integument    Primary osteoarthritis of right knee - Primary     Findings consistent with right knee severe medial osteoarthritis. Patient is not ready for right total knee replacement and wants to continue with conservative treatment for right knee. She was given cortisone injection, tolerated well, cold compress today. Continue with knee sleeve for support and 4 pt cane to ambulate. Tylenol as needed for pain along with voltaren gel or aspercreme. Stationary bike for low impact exercise. Repeat CSI in 3-4 months if needed. All patient's questions were answered to her satisfaction. This note is created using dictation transcription. It may contain typographical errors, grammatical errors, improperly dictated words, background noise and other errors. Relevant Medications    betamethasone acetate-betamethasone sodium phosphate (CELESTONE) injection 6 mg (Completed)    Other Relevant Orders    Large joint arthrocentesis: R knee (Completed)       Subjective:     Patient ID: Adolphus Merlin is a 80 y.o. female. Chief Complaint:  80 y.o. female presents to the office for follow up of right knee osteoarthritis. At last visit she received a cortisone injection to the right knee 4/27/23  which was beneficial for 6-8 weeks, not complete relief. She has had worsening symptoms over the past 4 weeks. She has been wearing a knee brace on the right. She is interested in repeating a cortisone injection today. She also notes left knee pain with history of ORIF. Patient had tried Visco supplement injection in the past right knee which also did not provide her with long-term relief.       Allergy:  No Known Allergies  Medications:  all current active meds have been reviewed  Past Medical History:  Past Medical History:   Diagnosis Date   • Acute otitis externa of left ear 4/12/2022 • Cardiomyopathy [facility])    • Chronic diarrhea of unknown origin     resolved 12/22/2015   • Contact dermatitis     last assessed 04/19/2013   • Esophageal reflux     resolved 50/84/8068   • Helicobacter pylori gastrointestinal tract infection 10/25/2018   • Hyperlipidemia     resolved 12/22/2015   • Muscle weakness (generalized)     resolved 12/22/2015   • Pacemaker    • Post-viral cough syndrome 12/12/2022   • Reactive airway disease     resolved 12/22/2015     Past Surgical History:  Past Surgical History:   Procedure Laterality Date   • LEG SURGERY Left    • OTHER SURGICAL HISTORY      open treatment of weight bearing distal tibial fracture     Family History:  Family History   Problem Relation Age of Onset   • Heart disease Father    • Diabetes Daughter    • Mental illness Daughter         disorder     Social History:  Social History     Substance and Sexual Activity   Alcohol Use No    Comment: denies alcohol use causing problems     Social History     Substance and Sexual Activity   Drug Use No     Social History     Tobacco Use   Smoking Status Never   Smokeless Tobacco Never     Review of Systems   Constitutional: Negative for chills and fever. HENT: Negative for drooling and sneezing. Eyes: Negative for redness. Respiratory: Negative for cough and wheezing. Gastrointestinal: Negative for nausea and vomiting. Endocrine: Negative. Genitourinary: Negative. Musculoskeletal: Positive for arthralgias (right knee), back pain (Antalgic), gait problem (Late with a cane) and joint swelling (Right knee). Psychiatric/Behavioral: Negative for behavioral problems. The patient is not nervous/anxious. Objective:  BP Readings from Last 1 Encounters:   07/27/23 120/70      Wt Readings from Last 1 Encounters:   07/27/23 51.3 kg (113 lb)      BMI:   Estimated body mass index is 19.4 kg/m² as calculated from the following:    Height as of this encounter: 5' 4" (1.626 m).     Weight as of this encounter: 51.3 kg (113 lb). BSA:   Estimated body surface area is 1.54 meters squared as calculated from the following:    Height as of this encounter: 5' 4" (1.626 m). Weight as of this encounter: 51.3 kg (113 lb). Physical Exam  Vitals and nursing note reviewed. Constitutional:       Appearance: Normal appearance. She is well-developed. HENT:      Head: Normocephalic and atraumatic. Right Ear: External ear normal.      Left Ear: External ear normal.   Eyes:      Extraocular Movements: Extraocular movements intact. Conjunctiva/sclera: Conjunctivae normal.   Neck:      Trachea: No tracheal deviation. Pulmonary:      Effort: Pulmonary effort is normal.   Musculoskeletal:      Cervical back: Neck supple. Right knee: No effusion. Instability Tests: Medial Fanny test negative and lateral Fanny test negative. Skin:     General: Skin is warm and dry. Neurological:      Mental Status: She is alert and oriented to person, place, and time. Deep Tendon Reflexes: Reflexes are normal and symmetric. Psychiatric:         Mood and Affect: Mood normal.         Behavior: Behavior normal.       Right Knee Exam     Tenderness   The patient is experiencing tenderness in the medial joint line. Range of Motion   Extension: normal   Flexion: 110     Tests   Fanny:  Medial - negative Lateral - negative  Varus: negative Valgus: negative  Patellar apprehension: negative    Other   Erythema: absent  Sensation: normal  Pulse: present  Swelling: none  Effusion: no effusion present    Comments:  Varus alignment  Patellofemoral joint crepitation with knee motion            No new images to review today      Large joint arthrocentesis: R knee  Universal Protocol:  Consent: Verbal consent obtained.   Risks and benefits: risks, benefits and alternatives were discussed  Consent given by: patient  Patient understanding: patient states understanding of the procedure being performed  Site marked: the operative site was marked  Patient identity confirmed: verbally with patient    Supporting Documentation  Indications: pain   Procedure Details  Location: knee - R knee  Preparation: Patient was prepped and draped in the usual sterile fashion  Needle size: 22 G  Ultrasound guidance: no  Approach: anterolateral  Medications administered: 6 mg betamethasone acetate-betamethasone sodium phosphate 6 (3-3) mg/mL (7 ML 0.25% marcaine injection intr articular )    Patient tolerance: patient tolerated the procedure well with no immediate complications  Dressing:  Sterile dressing applied        Scribe Attestation    I,:  Lara Scanlon am acting as a scribe while in the presence of the attending physician.:       I,:  Seth Chaparro MD personally performed the services described in this documentation    as scribed in my presence.:

## 2023-08-01 DIAGNOSIS — J30.2 SEASONAL ALLERGIC RHINITIS, UNSPECIFIED TRIGGER: ICD-10-CM

## 2023-08-01 RX ORDER — LORATADINE 10 MG/1
10 TABLET ORAL DAILY
Qty: 30 TABLET | Refills: 3 | Status: SHIPPED | OUTPATIENT
Start: 2023-08-01

## 2023-08-04 ENCOUNTER — OFFICE VISIT (OUTPATIENT)
Dept: NEPHROLOGY | Facility: CLINIC | Age: 83
End: 2023-08-04

## 2023-08-04 VITALS
HEART RATE: 60 BPM | WEIGHT: 110 LBS | SYSTOLIC BLOOD PRESSURE: 132 MMHG | BODY MASS INDEX: 18.78 KG/M2 | DIASTOLIC BLOOD PRESSURE: 78 MMHG | HEIGHT: 64 IN

## 2023-08-04 DIAGNOSIS — N18.31 STAGE 3A CHRONIC KIDNEY DISEASE (HCC): Primary | ICD-10-CM

## 2023-08-04 DIAGNOSIS — I10 PRIMARY HYPERTENSION: ICD-10-CM

## 2023-08-04 DIAGNOSIS — I50.22 CHRONIC SYSTOLIC CONGESTIVE HEART FAILURE (HCC): ICD-10-CM

## 2023-08-04 DIAGNOSIS — N25.81 SECONDARY HYPERPARATHYROIDISM OF RENAL ORIGIN (HCC): ICD-10-CM

## 2023-08-04 DIAGNOSIS — E55.9 VITAMIN D DEFICIENCY: ICD-10-CM

## 2023-08-04 RX ORDER — CALCITRIOL 0.25 UG/1
0.25 CAPSULE, LIQUID FILLED ORAL 3 TIMES WEEKLY
Qty: 48 CAPSULE | Refills: 4 | Status: SHIPPED | OUTPATIENT
Start: 2023-08-04

## 2023-08-04 NOTE — PROGRESS NOTES
Nephrology Follow up Consultation  Allie Garcia 80 y.o. female MRN: [id number]            BACKGROUND:  Allie Garcia is a 80 y. o.female who was referred by Indira Duff DO for evaluation of Chronic Kidney Disease and Follow-up  . ASSESSMENT / PLAN:   80 y.o.  female with pmh of multiple co-morbidities including CHF, hypertension (x 8yrs), GERD, hyperlipidemia, arthritis, nonischemic cardiomyopathy with BiV ICD in place presents to the office for routine follow-up. CKD stage 3A/B:  - After review of records in [facility] as well as Care everywhere patient has a baseline creatinine of 0.8-1.2 mg/dL dating as far back as 2016. Most recent labs show a Creatinine of 0.98 mg/dL on 7/17/23. Renal function remains stable.   -Likely has underlying CKD secondary to age-related nephron loss plus cardiorenal syndrome plus hypertensive nephrosclerosis plus decreased nephron mass due to renal cysts. -Patient with significantly elevated free light chain ratio SPEP normal UPEP normal was referred over to hematology.   -Renal ultrasound from 9/29/2022 bilateral kidneys approximately 9 and 10 cm bilateral renal cysts. Simple cysts. - Acid base and lytes stable. - Clinically the patient appears to be euvolemic. Will not change diuretics at this time  - Recommend to avoid use of NSAIDs, nephrotoxins. Caution advised with regards to exposure to IV contrast dye.   - Discussed with the patient in depth her renal status, including the possible etiologies for CKD. - Advised the patient that when her GFR is close to 20mL/min then will start discussing about RRT(renal replacement therapy) options such as renal transplant, peritoneal dialysis and hemodialysis. - Informed the patient about the various options for Renal Replacement therapy.   - Discussed with the patient how we need to work together to delay the progression of CKD with optimal BP control based on their age and co-morbidities and trying to reduce proteinuria by the use of anti-proteinuric agents.   -Wishes to hold off on CKD education/kidney smart at this time did discuss with the patient    Hypertension:  - Patient is on losartan 100mg po Q24, Lasix 40 mg p.o. daily, Toprol-XL 25 mg p.o. daily.   -Discussed appropriate techniques of checking blood pressures advised patient to call with updates in case medications needs further adjustments  - Goal BP of <  130/80 based on age and comorbidities  - Instructed to follow low sodium (2gm)diet.  - Advised to hold ACEI/ARBs if patient suffers from dehydration due to gastrointestinal losses due to risk of ARUN secondary to failure to autoregulate. Hemoglobin:  - Goal Hb of 10-12 g/dL  - Most recent labs suggestive of 11.8 g/dL. -No role for IV iron    CKD-MBD(Mineral Bone Disease)/vitamin D deficiency/secondary hyperparathyroidism of renal origin:  - Based on patients CKD stage following is the goal of therapy. - Maintain calcium phosphorus product of < 55.  - Stage 3 CKD - Goal Ca 8.5-10 mg/dL , goal Phos 2.7-4.6 mg/dL  , goal iPTH 30-70 pg/mL  - Patient is currently not at goal.  - patient on calcium plus D, vit D 1000 units po Q24, Prolia  - Advised patient to hold vitamin D for now and start calcitriol 0.25 mcg 3 times a week  - Patients' most recent vitamin D level is 57.2and intact PTH of 169.8 as of 5/20/2023  -Check intact PTH vitamin D prior to next visit    Proteinuria:  - most recent UA positive for nitrites no blood no protein  -Most recent protein creatinine ratio 130 mg as of 11/22/2022  -Most recent micro and creatinine ratio 9 mg/dL as of July 2023  - check protein creatinine ratio  - currently on therapy for proteinuria with losartan    Lipids:  - goal LDL less than 70  - Management as per PCP    CHF/nonischemic cardiomyopathy/BiV ICD in place:  - Management as per primary team  -Last seen by cardiology 12/7/2022 notes reviewed. -Most recent echo from March 2022 with EF of 30 to 35%.     Abnormal free light chain ratio:  -Was referred over to hematology last seen 4/27/2023 we will continue routine work-up with them    Nutrition:  - Encouraged patient to follow a renal diet comprising of moderate potassium, low phosphorus and protein restriction to 0.8gm/kg. - Will check serum albumin with next blood work. Followup:  - Patient is to follow-up in 6 months, with lab work to be performed in a few days prior to the next visit. Advised patient to call me in 10 days to review the results if they do not hear back from me, as I may have not received the results. LIANNE MCLEOD MD, FASN, 8/4/2023, 2:23 PM             SUBJECTIVE: 80 y.o. female presents to the office for routine follow-up. Feels well has no complaints no recent hospitalization no issues with edema no NSAID use not really checking blood pressures at home thankful for the care information that she is gone today happy renal parameters are stable wishes to hold off on CKD education/kidney smart at this time. Is following up with hematology oncology for the abnormal free light chain ratio. Review of Systems   Constitutional: Negative for chills and fever. HENT: Negative for sore throat. Respiratory: Negative for shortness of breath and wheezing. Cardiovascular: Negative for leg swelling. Gastrointestinal: Negative for diarrhea and vomiting. Genitourinary: Negative for difficulty urinating, dysuria and hematuria. Musculoskeletal: Negative for back pain. Neurological: Negative for headaches. Psychiatric/Behavioral: Negative for agitation and confusion. All other systems reviewed and are negative.       PAST MEDICAL HISTORY:  Past Medical History:   Diagnosis Date   • Acute otitis externa of left ear 4/12/2022   • Cardiomyopathy [facility])    • Chronic diarrhea of unknown origin     resolved 12/22/2015   • Contact dermatitis     last assessed 04/19/2013   • Esophageal reflux     resolved 62/16/3020   • Helicobacter pylori gastrointestinal tract infection 10/25/2018   • Hyperlipidemia     resolved 12/22/2015   • Muscle weakness (generalized)     resolved 12/22/2015   • Pacemaker    • Post-viral cough syndrome 12/12/2022   • Reactive airway disease     resolved 12/22/2015       PROBLEM LIST    Patient Active Problem List   Diagnosis   • Allergic rhinitis   • Cardiomyopathy ([address])   • CHF (congestive heart failure) ([facility])   • Cough, persistent   • GERD (gastroesophageal reflux disease)   • Hypertension   • Osteoporosis   • TMJ syndrome   • Positive 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 disease   • AICD (automatic cardioverter/defibrillator) present   • Other specified glaucoma   • Primary osteoarthritis of right knee   • Environmental and seasonal allergies   • Chronic pain of left knee   • Primary osteoarthritis of left knee   • Leg length discrepancy   • Tricuspid regurgitation   • PVC's (premature ventricular contractions)   • Stage 3a chronic kidney disease ([facility])   • Vitamin D deficiency   • Vasomotor rhinitis   • Secondary hyperparathyroidism of renal origin ([address])       PAST SURGICAL HISTORY:  Past Surgical History:   Procedure Laterality Date   • LEG SURGERY Left    • OTHER SURGICAL HISTORY      open treatment of weight bearing distal tibial fracture       SOCIAL HISTORY :   reports that she has never smoked. She has never used smokeless tobacco. She reports that she does not drink alcohol and does not use drugs. FAMILY HISTORY:  Family History   Problem Relation Age of Onset   • Heart disease Father    • Diabetes Daughter    • Mental illness Daughter         disorder       ALLERGIES:  No Known Allergies        PHYSICAL EXAM:  Vitals:    08/04/23 1403 08/04/23 1414   BP: 142/76 132/78   BP Location: Left arm    Patient Position: Sitting    Cuff Size: Standard    Pulse: 60    Weight: 49.9 kg (110 lb)    Height: 5' 4" (1.626 m)      Body mass index is 18.88 kg/m². Physical Exam  Vitals reviewed.    Constitutional:       General: She is not in acute distress. Appearance: Normal appearance. She is normal weight. She is not ill-appearing, toxic-appearing or diaphoretic. HENT:      Head: Normocephalic and atraumatic. Mouth/Throat:      Pharynx: No oropharyngeal exudate. Eyes:      General: No scleral icterus. Conjunctiva/sclera: Conjunctivae normal.   Cardiovascular:      Rate and Rhythm: Normal rate. Heart sounds: Normal heart sounds. No friction rub. Pulmonary:      Effort: No respiratory distress. Breath sounds: Normal breath sounds. No stridor. Abdominal:      General: There is no distension. Palpations: There is no mass. Tenderness: There is no abdominal tenderness. There is no right CVA tenderness or left CVA tenderness. Musculoskeletal:         General: No swelling. Cervical back: Normal range of motion. No rigidity. Skin:     Coloration: Skin is not jaundiced. Neurological:      General: No focal deficit present. Mental Status: She is alert and oriented to person, place, and time.    Psychiatric:         Mood and Affect: Mood normal.         Behavior: Behavior normal.         LABORATORY DATA:     Results from last 6 Months   Lab Units 07/17/23  0949 04/04/23  1050   WBC Thousand/uL  --  8.38   HEMOGLOBIN g/dL  --  11.8   HEMATOCRIT %  --  39.5   PLATELETS Thousands/uL  --  166   POTASSIUM mmol/L 4.0 4.1   CHLORIDE mmol/L 101 106   CO2 mmol/L 30 27   BUN mg/dL 18 22   CREATININE mg/dL 0.98 1.02   CALCIUM mg/dL 9.3 9.3   MAGNESIUM mg/dL 2.5 2.6   PHOSPHORUS mg/dL 3.1 3.6        rest all reviewed    RADIOLOGY:  No orders to display     Rest all reviewed        MEDICATIONS:    Current Outpatient Medications:   •  amiodarone 200 mg tablet, Take 0.5 tablets (100 mg total) by mouth daily, Disp: 45 tablet, Rfl: 3  •  aspirin 81 MG tablet, Take 1 tablet by mouth daily, Disp: , Rfl:   •  brimonidine tartrate 0.2 % ophthalmic solution, 1 drop 3 (three) times a day, Disp: , Rfl:   •  calcitriol (ROCALTROL) 0.25 mcg capsule, Take 1 capsule (0.25 mcg total) by mouth 3 (three) times a week, Disp: 48 capsule, Rfl: 4  •  denosumab (Prolia) 60 mg/mL, as directed, Disp: , Rfl:   •  furosemide (LASIX) 40 mg tablet, Take 40 mg by mouth daily  , Disp: , Rfl:   •  latanoprost (XALATAN) 0.005 % ophthalmic solution, , Disp: , Rfl:   •  loratadine (CLARITIN) 10 mg tablet, Take 1 tablet (10 mg total) by mouth daily, Disp: 30 tablet, Rfl: 3  •  losartan (COZAAR) 100 MG tablet, Take 1 tablet (100 mg total) by mouth every 24 hours, Disp: 90 tablet, Rfl: 2  •  metoprolol succinate (TOPROL-XL) 25 mg 24 hr tablet, Take 1 tablet (25 mg total) by mouth daily, Disp: 90 tablet, Rfl: 2  •  Multiple Vitamin (DAILY VALUE MULTIVITAMIN) TABS, Take 1 tablet by mouth daily (Patient not taking: Reported on 4/27/2023), Disp: , Rfl:   •  neomycin-polymyxin-hydrocortisone (CORTISPORIN) otic solution, Administer 3 drops to the right ear 4 (four) times a day for 7 days (Patient not taking: Reported on 4/13/2023), Disp: 10 mL, Rfl: 0          Portions of the record may have been created with voice recognition software. Occasional wrong word or "sound a like" substitutions may have occurred due to the inherent limitations of voice recognition software. Read the chart carefully and recognize, using context, where substitutions have occurred. If you have any questions, please contact the dictating provider.

## 2023-08-04 NOTE — PATIENT INSTRUCTIONS
- start calcitriol 0.25mcg three times a week  - hold all over the counter vit D for now    - Please call me in 10 days after having your blood work done to review the results if you do not hear back from me or my office, as I may have not received the results. - please remember to perform blood work prior to the next visit. - Please call if the blood pressure top number is greater than 140 or less than 110 consistently. - Please call if you are gaining more than 2lbs in 2 days for adjustment of water pills.  ~ Please AVOID the following pain medications. LIST OF NSAIDS (NONSTEROIDAL ANTI-INFLAMMATORY DRUGS) AND LORENZ-2 INHIBITORS    DIFLUNISAL (DOLOBID)  IBUPROFEN (MOTRIN, ADVIL)  FLURBIPROFEN (ANSAID)  KETOPROFEN (ORUDIS, ORUVAIL)  FENOPROFEN (NALFON)  NABUMETONE (RELAFEN)  PIROXICAM (FELDENE)  NAPROXEN (ALEVE, NAPROSYN, NAPRELAN, ANAPROX)  DICLOFENAC (VOLTAREN, CATAFLAM)  INDOMETHACIN (INDOCIN)  SULINDAC (CLINORIL)  TOLMETIN (TOLETIN)  ETODOLAC (LODINE)  MELOXICAM (MOBIC)  KETOROLAC (TORADOL)  OXAPROZIN (DAYPRO)  CELECOXIB (CELEBREX)    Phosphorus diet  Follow a low phosphorus diet.     Avoid these higher phosphorus foods: Choose these lower phosphorus foods:   Milk, pudding or yogurt (from animals and from many soy varieties) Rice milk (unfortified), nondairy creamer (if it doesn't have terms in the ingredients list that contain the letters "phos")   Hard cheeses, ricotta or cottage cheese, fat-free cream cheese Regular and low-fat cream cheese   Ice cream or frozen yogurt Sherbet or frozen fruit pops   Soups made with higher phosphorus ingredients (milk, dried peas, beans, lentils) Soups made with lower phosphorus ingredients (broth- or water-based with other lower phosphorus ingredients)   Whole grains, including whole-grain breads, crackers, cereal, rice and pasta Refined grains, including white bread, crackers, cereals, rice and pasta   Quick breads, biscuits, cornbread, muffins, pancakes or waffles Homemade refined (white) dinner rolls, bagels or English muffins   Dried peas (split, black-eyed), beans (black, garbanzo, lima, kidney, navy, noel) or lentils Green peas (canned, frozen), green beans or wax beans   Organ meats, walleye, pollock or sardines Lean beef, pork, lamb, poultry or other fish   Nuts and seeds Popcorn   Peanut butter and other nut butters Jam, jelly or honey   Chocolate, including chocolate drinks Carob (chocolate-flavored) candy, hard candy or gumdrops   Loraine and pepper-type sodas, flavored gillette, bottled teas (if a term in the ingredients list contains the letters "phos") Lemon-lime soda, ginger ale or root beer, plain water   Follow a moderate potassium diet. Things to do to reduce your blood pressure include working with all your physician to do the following:  ~ stop smoking if you smoke. ~ increase cardiovascular exercise like walking and swimming.   ~ modify your diet to decrease fat and salt intake. ~ reduce your weight if you are overweight or obese.  ~ increase the consumption of fruits, vegetables and whole grains. ~ decrease alcohol consumption if you consume alcohol.   ~ try to minimize stress in your life with lifestyle modifications. ~ be compliant with your anti-hypertensive medications. ~ adjust your medications to help improve your vascular stiffness and decrease risks for heart attacks and strokes.

## 2023-08-07 ENCOUNTER — RA CDI HCC (OUTPATIENT)
Dept: OTHER | Facility: HOSPITAL | Age: 83
End: 2023-08-07

## 2023-08-15 ENCOUNTER — OFFICE VISIT (OUTPATIENT)
Dept: FAMILY MEDICINE CLINIC | Facility: CLINIC | Age: 83
End: 2023-08-15
Payer: MEDICARE

## 2023-08-15 VITALS
WEIGHT: 110 LBS | SYSTOLIC BLOOD PRESSURE: 130 MMHG | DIASTOLIC BLOOD PRESSURE: 84 MMHG | RESPIRATION RATE: 16 BRPM | HEIGHT: 64 IN | HEART RATE: 60 BPM | TEMPERATURE: 98.5 F | OXYGEN SATURATION: 99 % | BODY MASS INDEX: 18.78 KG/M2

## 2023-08-15 DIAGNOSIS — Z13.6 SCREENING FOR CARDIOVASCULAR CONDITION: ICD-10-CM

## 2023-08-15 DIAGNOSIS — M17.11 PRIMARY OSTEOARTHRITIS OF RIGHT KNEE: ICD-10-CM

## 2023-08-15 DIAGNOSIS — R29.898 MUSCULAR DECONDITIONING: ICD-10-CM

## 2023-08-15 DIAGNOSIS — Z13.1 SCREENING FOR DIABETES MELLITUS: ICD-10-CM

## 2023-08-15 DIAGNOSIS — M17.12 PRIMARY OSTEOARTHRITIS OF LEFT KNEE: ICD-10-CM

## 2023-08-15 DIAGNOSIS — Z00.00 MEDICARE ANNUAL WELLNESS VISIT, SUBSEQUENT: Primary | ICD-10-CM

## 2023-08-15 PROCEDURE — G0439 PPPS, SUBSEQ VISIT: HCPCS | Performed by: FAMILY MEDICINE

## 2023-08-15 RX ORDER — TIMOLOL MALEATE 5 MG/ML
SOLUTION/ DROPS OPHTHALMIC
COMMUNITY
Start: 2023-08-03

## 2023-08-15 NOTE — PATIENT INSTRUCTIONS
Medicare Preventive Visit Patient Instructions  Thank you for completing your Welcome to Medicare Visit or Medicare Annual Wellness Visit today. Your next wellness visit will be due in one year (8/15/2024). The screening/preventive services that you may require over the next 5-10 years are detailed below. Some tests may not apply to you based off risk factors and/or age. Screening tests ordered at today's visit but not completed yet may show as past due. Also, please note that scanned in results may not display below. Preventive Screenings:  Service Recommendations Previous Testing/Comments   Colorectal Cancer Screening  * Colonoscopy    * Fecal Occult Blood Test (FOBT)/Fecal Immunochemical Test (FIT)  * Fecal DNA/Cologuard Test  * Flexible Sigmoidoscopy Age: 43-73 years old   Colonoscopy: every 10 years (may be performed more frequently if at higher risk)  OR  FOBT/FIT: every 1 year  OR  Cologuard: every 3 years  OR  Sigmoidoscopy: every 5 years  Screening may be recommended earlier than age 39 if at higher risk for colorectal cancer. Also, an individualized decision between you and your healthcare provider will decide whether screening between the ages of 77-80 would be appropriate. Colonoscopy: 05/29/2012  FOBT/FIT: Not on file  Cologuard: Not on file  Sigmoidoscopy: Not on file          Breast Cancer Screening Age: 36 years old  Frequency: every 1-2 years  Not required if history of left and right mastectomy Mammogram: 11/06/2018        Cervical Cancer Screening Between the ages of 21-29, pap smear recommended once every 3 years. Between the ages of 32-69, can perform pap smear with HPV co-testing every 5 years.    Recommendations may differ for women with a history of total hysterectomy, cervical cancer, or abnormal pap smears in past. Pap Smear: Not on file        Hepatitis C Screening Once for adults born between [address] [place] [address]  More frequently in patients at high risk for Hepatitis C Hep C Antibody: Not on file        Diabetes Screening 1-2 times per year if you're at risk for diabetes or have pre-diabetes Fasting glucose: 104 mg/dL (7/17/2023)  A1C: No results in last 5 years (No results in last 5 years)      Cholesterol Screening Once every 5 years if you don't have a lipid disorder. May order more often based on risk factors. Lipid panel: 08/18/2022          Other Preventive Screenings Covered by Medicare:  1. Abdominal Aortic Aneurysm (AAA) Screening: covered once if your at risk. You're considered to be at risk if you have a family history of AAA. 2. Lung Cancer Screening: covers low dose CT scan once per year if you meet all of the following conditions: (1) Age 48-67; (2) No signs or symptoms of lung cancer; (3) Current smoker or have quit smoking within the last 15 years; (4) You have a tobacco smoking history of at least 20 pack years (packs per day multiplied by number of years you smoked); (5) You get a written order from a healthcare provider. 3. Glaucoma Screening: covered annually if you're considered high risk: (1) You have diabetes OR (2) Family history of glaucoma OR (3)  aged 48 and older OR (3)  American aged 72 and older  3. Osteoporosis Screening: covered every 2 years if you meet one of the following conditions: (1) You're estrogen deficient and at risk for osteoporosis based off medical history and other findings; (2) Have a vertebral abnormality; (3) On glucocorticoid therapy for more than 3 months; (4) Have primary hyperparathyroidism; (5) On osteoporosis medications and need to assess response to drug therapy. · Last bone density test (DXA Scan): 08/18/2022.  5. HIV Screening: covered annually if you're between the age of 15-65. Also covered annually if you are younger than 13 and older than 72 with risk factors for HIV infection. For pregnant patients, it is covered up to 3 times per pregnancy.     Immunizations:  Immunization Recommendations   Influenza Vaccine Annual influenza vaccination during flu season is recommended for all persons aged >= 6 months who do not have contraindications   Pneumococcal Vaccine   * Pneumococcal conjugate vaccine = PCV13 (Prevnar 13), PCV15 (Vaxneuvance), PCV20 (Prevnar 20)  * Pneumococcal polysaccharide vaccine = PPSV23 (Pneumovax) Adults 20-63 years old: 1-3 doses may be recommended based on certain risk factors  Adults 72 years old: 1-2 doses may be recommended based off what pneumonia vaccine you previously received   Hepatitis B Vaccine 3 dose series if at intermediate or high risk (ex: diabetes, end stage renal disease, liver disease)   Tetanus (Td) Vaccine - COST NOT COVERED BY MEDICARE PART B Following completion of primary series, a booster dose should be given every 10 years to maintain immunity against tetanus. Td may also be given as tetanus wound prophylaxis. Tdap Vaccine - COST NOT COVERED BY MEDICARE PART B Recommended at least once for all adults. For pregnant patients, recommended with each pregnancy. Shingles Vaccine (Shingrix) - COST NOT COVERED BY MEDICARE PART B  2 shot series recommended in those aged 48 and above     Health Maintenance Due:  There are no preventive care reminders to display for this patient. Immunizations Due:      Topic Date Due   • COVID-19 Vaccine (5 - Pfizer series) 03/29/2022   • Influenza Vaccine (1) 09/01/2023     Advance Directives   What are advance directives? Advance directives are legal documents that state your wishes and plans for medical care. These plans are made ahead of time in case you lose your ability to make decisions for yourself. Advance directives can apply to any medical decision, such as the treatments you want, and if you want to donate organs. What are the types of advance directives? There are many types of advance directives, and each state has rules about how to use them. You may choose a combination of any of the following:  · Living will:   This is a written record of the treatment you want. You can also choose which treatments you do not want, which to limit, and which to stop at a certain time. This includes surgery, medicine, IV fluid, and tube feedings. · Durable power of  for [facility]): This is a written record that states who you want to make healthcare choices for you when you are unable to make them for yourself. This person, called a proxy, is usually a family member or a friend. You may choose more than 1 proxy. · Do not resuscitate (DNR) order:  A DNR order is used in case your heart stops beating or you stop breathing. It is a request not to have certain forms of treatment, such as CPR. A DNR order may be included in other types of advance directives. · Medical directive: This covers the care that you want if you are in a coma, near death, or unable to make decisions for yourself. You can list the treatments you want for each condition. Treatment may include pain medicine, surgery, blood transfusions, dialysis, IV or tube feedings, and a ventilator (breathing machine). · Values history: This document has questions about your views, beliefs, and how you feel and think about life. This information can help others choose the care that you would choose. Why are advance directives important? An advance directive helps you control your care. Although spoken wishes may be used, it is better to have your wishes written down. Spoken wishes can be misunderstood, or not followed. Treatments may be given even if you do not want them. An advance directive may make it easier for your family to make difficult choices about your care. © Copyright [employer] 2018 Information is for End User's use only and may not be sold, redistributed or otherwise used for commercial purposes.  All illustrations and images included in CareNotes® are the copyrighted property of [employer] or [employer]

## 2023-08-15 NOTE — PROGRESS NOTES
Assessment and Plan:     Problem List Items Addressed This Visit        Musculoskeletal and Integument    Primary osteoarthritis of left knee    Relevant Orders    Ambulatory Referral to Physical Therapy    Primary osteoarthritis of right knee    Relevant Orders    Ambulatory Referral to Physical Therapy   Other Visit Diagnoses     Medicare annual wellness visit, subsequent    -  Primary    Screening for cardiovascular condition        Relevant Orders    Comprehensive metabolic panel    Lipid panel    Screening for diabetes mellitus        Relevant Orders    Comprehensive metabolic panel    Lipid panel    Muscular deconditioning        Relevant Orders    Ambulatory Referral to Physical Therapy      The patient had a normal exam today in the office. She will follow-up with all of her specialists as scheduled. We are going to refer her to physical therapy to help with her muscular deconditioning and the arthritis in her knees. She will schedule this at her convenience. She will go for the testing as indicated. We will see her back in the office as scheduled. Depression Screening and Follow-up Plan: Patient was screened for depression during today's encounter. They screened negative with a PHQ-2 score of 0. Preventive health issues were discussed with patient, and age appropriate screening tests were ordered as noted in patient's After Visit Summary. Personalized health advice and appropriate referrals for health education or preventive services given if needed, as noted in patient's After Visit Summary. Chief Complaint   Patient presents with   • Medicare Wellness Visit     Subsequent Medicare Wellness        History of Present Illness:     Patient presents for a Medicare Wellness Visit    Berna Amador is a 80 y.o. female who presents for a Subsequent Medicare Wellness visit. She is taking all of her medications. She sees her Cardiologist, Nephrologist, and hematologist regularly.   The patient denies any chest pain, shortness of breath, or palpitations. There is no edema. There are no headaches or visual changes. There is no lightheadedness, dizziness, or fainting spells. The patient currently denies any nausea, vomiting, or GERD symptoms. she has normal bowel movements and normal urine output. she has a normal appetite. She is feeling tired on her medication. Patient is complaining about weakness in her legs related to her arthritis. She does have some balance issues as well. Patient Care Team:  Raquel Hunter DO as PCP - General (Family Medicine)  Jaquelin Wall DO (Cardiology)  Zari Dutton MD (Orthopedic Surgery)  Fredderick Boast, DO (Nephrology)  Rob Chaparro MD (Nephrology)     Review of Systems:     Review of Systems   Constitutional: Negative. HENT: Negative. Eyes: Negative. Respiratory: Negative. Cardiovascular: Negative. Gastrointestinal: Negative. Endocrine: Negative. Genitourinary: Negative. Musculoskeletal: Negative. Skin: Negative. Allergic/Immunologic: Negative. Neurological: Negative. Hematological: Negative. Psychiatric/Behavioral: Negative.          Problem List:     Patient Active Problem List   Diagnosis   • Allergic rhinitis   • Cardiomyopathy ([facility])   • CHF (congestive heart failure) ([facility])   • Cough, persistent   • GERD (gastroesophageal reflux disease)   • Hypertension   • Osteoporosis   • TMJ syndrome   • Positive 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 disease   • AICD (automatic cardioverter/defibrillator) present   • Other specified glaucoma   • Primary osteoarthritis of right knee   • Environmental and seasonal allergies   • Chronic pain of left knee   • Primary osteoarthritis of left knee   • Leg length discrepancy   • Tricuspid regurgitation   • PVC's (premature ventricular contractions)   • Stage 3a chronic kidney disease (HCC)   • Vitamin D deficiency   • Vasomotor rhinitis   • Secondary hyperparathyroidism of renal origin [facility])      Past Medical and Surgical History:     Past Medical History:   Diagnosis Date   • Acute otitis externa of left ear 4/12/2022   • Cardiomyopathy ([address])    • Chronic diarrhea of unknown origin     resolved 12/22/2015   • Contact dermatitis     last assessed 04/19/2013   • Esophageal reflux     resolved 51/76/7260   • Helicobacter pylori gastrointestinal tract infection 10/25/2018   • Hyperlipidemia     resolved 12/22/2015   • Muscle weakness (generalized)     resolved 12/22/2015   • Pacemaker    • Post-viral cough syndrome 12/12/2022   • Reactive airway disease     resolved 12/22/2015     Past Surgical History:   Procedure Laterality Date   • LEG SURGERY Left    • OTHER SURGICAL HISTORY      open treatment of weight bearing distal tibial fracture      Family History:     Family History   Problem Relation Age of Onset   • Heart disease Father    • Diabetes Daughter    • Mental illness Daughter         disorder      Social History:     Social History     Socioeconomic History   • Marital status:      Spouse name: None   • Number of children: None   • Years of education: None   • Highest education level: None   Occupational History   • Occupation: retired   Tobacco Use   • Smoking status: Never   • Smokeless tobacco: Never   Vaping Use   • Vaping Use: Never used   Substance and Sexual Activity   • Alcohol use: No     Comment: denies alcohol use causing problems   • Drug use: No   • Sexual activity: None   Other Topics Concern   • None   Social History Narrative    . Social Determinants of Health     Financial Resource Strain: Low Risk  (8/15/2023)    Overall Financial Resource Strain (CARDIA)    • Difficulty of Paying Living Expenses: Not hard at all   Food Insecurity: Not on file   Transportation Needs: No Transportation Needs (8/15/2023)    PRAPARE - Transportation    • Lack of Transportation (Medical): No    • Lack of Transportation (Non-Medical):  No   Physical Activity: Insufficiently Active (6/13/2022)    Exercise Vital Sign    • Days of Exercise per Week: 2 days    • Minutes of Exercise per Session: 30 min   Stress: No Stress Concern Present (6/13/2022)    [facility]    • Feeling of Stress : Only a little   Social Connections: Not on file   Intimate Partner Violence: Not At Risk (8/15/2023)    Humiliation, Afraid, Rape, and Kick questionnaire    • Fear of Current or Ex-Partner: No    • Emotionally Abused: No    • Physically Abused: No    • Sexually Abused: No   Housing Stability: Not on file      Medications and Allergies:     Current Outpatient Medications   Medication Sig Dispense Refill   • amiodarone 200 mg tablet Take 0.5 tablets (100 mg total) by mouth daily 45 tablet 3   • aspirin 81 MG tablet Take 1 tablet by mouth daily     • calcitriol (ROCALTROL) 0.25 mcg capsule Take 1 capsule (0.25 mcg total) by mouth 3 (three) times a week 48 capsule 4   • denosumab (Prolia) 60 mg/mL as directed     • furosemide (LASIX) 40 mg tablet Take 40 mg by mouth daily       • latanoprost (XALATAN) 0.005 % ophthalmic solution      • loratadine (CLARITIN) 10 mg tablet Take 1 tablet (10 mg total) by mouth daily 30 tablet 3   • losartan (COZAAR) 100 MG tablet Take 1 tablet (100 mg total) by mouth every 24 hours 90 tablet 2   • metoprolol succinate (TOPROL-XL) 25 mg 24 hr tablet Take 1 tablet (25 mg total) by mouth daily 90 tablet 2   • timolol (TIMOPTIC) 0.5 % ophthalmic solution        No current facility-administered medications for this visit.      No Known Allergies   Immunizations:     Immunization History   Administered Date(s) Administered   • COVID-19 PFIZER VACCINE 0.3 ML IM 01/20/2021, 02/11/2021, 11/02/2021, 02/01/2022   • INFLUENZA 09/20/2018   • Influenza Split High Dose Preservative Free IM 10/09/2012, 09/26/2013, 09/29/2014, 10/28/2015, 09/29/2016, 10/17/2017   • Influenza, high dose seasonal 0.7 mL 09/20/2018, 09/26/2019, 09/19/2022   • Pneumococcal Conjugate 13-Valent 12/22/2015, 09/26/2019   • Pneumococcal Polysaccharide PPV23 10/17/2005, 11/10/2011   • Tuberculin Skin Test-PPD Intradermal 11/13/2006, 11/20/2006      Health Maintenance: There are no preventive care reminders to display for this patient. Topic Date Due   • COVID-19 Vaccine (5 - Pfizer series) 03/29/2022   • Influenza Vaccine (1) 09/01/2023      Medicare Screening Tests and Risk Assessments:     Katie Nelson is here for her Subsequent Wellness visit. Last Medicare Wellness visit information reviewed, patient interviewed and updates made to the record today. Health Risk Assessment:   Patient rates overall health as fair. Patient feels that their physical health rating is same. Patient is satisfied with their life. Eyesight was rated as same. Hearing was rated as same. Patient feels that their emotional and mental health rating is same. Patients states they are sometimes angry. Patient states they are often unusually tired/fatigued. Pain experienced in the last 7 days has been none. Patient states that she has experienced no weight loss or gain in last 6 months. Depression Screening:   PHQ-2 Score: 0      Fall Risk Screening: In the past year, patient has experienced: no history of falling in past year      Urinary Incontinence Screening:   Patient has not leaked urine accidently in the last six months. Home Safety:  Patient has trouble with stairs inside or outside of their home. Patient has working smoke alarms and has working carbon monoxide detector. Home safety hazards include: none. She uses a cane. She lives on one floor. Nutrition:   Current diet is Regular. She is eating a healthy diet. Medications:   Patient is currently taking over-the-counter supplements. OTC medications include: see medication list. Patient is able to manage medications.      Activities of Daily Living (ADLs)/Instrumental Activities of Daily Living (IADLs):   Walk and transfer into and out of bed and chair?: Yes  Dress and groom yourself?: Yes    Bathe or shower yourself?: Yes    Feed yourself? Yes  Do your laundry/housekeeping?: Yes  Manage your money, pay your bills and track your expenses?: Yes  Make your own meals?: Yes    Do your own shopping?: Yes    Previous Hospitalizations:   Any hospitalizations or ED visits within the last 12 months?: No      Advance Care Planning:   Living will: Yes    Durable POA for healthcare: Yes    Advanced directive: Yes    Advanced directive counseling given: Yes    Five wishes given: No    Patient declined ACP directive: No    End of Life Decisions reviewed with patient: Yes    Provider agrees with end of life decisions: Yes      Cognitive Screening:   Provider or family/friend/caregiver concerned regarding cognition?: No    PREVENTIVE SCREENINGS      Cardiovascular Screening:    General: Screening Current and Risks and Benefits Discussed    Due for: Lipid Panel      Diabetes Screening:     General: Screening Current and Risks and Benefits Discussed    Due for: Blood Glucose      Colorectal Cancer Screening:     General: Risks and Benefits Discussed and Screening Not Indicated      Breast Cancer Screening:     General: Risks and Benefits Discussed and Patient Declines      Cervical Cancer Screening:    General: Screening Not Indicated      Osteoporosis Screening:    General: Screening Not Indicated and History Osteoporosis    Due for: Bone Density Ultrasound      Abdominal Aortic Aneurysm (AAA) Screening:        General: Screening Not Indicated      Lung Cancer Screening:     General: Screening Not Indicated      Hepatitis C Screening:    General: Screening Not Indicated    Screening, Brief Intervention, and Referral to Treatment (SBIRT)    Screening  Typical number of drinks in a day: 0  Typical number of drinks in a week: 0  Interpretation: Low risk drinking behavior.     AUDIT-C Screenin) How often did you have a drink containing alcohol in the past year? never  2) How many drinks did you have on a typical day when you were drinking in the past year? 0  3) How often did you have 6 or more drinks on one occasion in the past year? never    AUDIT-C Score: 0  Interpretation: Score 0-2 (female): Negative screen for alcohol misuse    Single Item Drug Screening:  How often have you used an illegal drug (including marijuana) or a prescription medication for non-medical reasons in the past year? never    Single Item Drug Screen Score: 0  Interpretation: Negative screen for possible drug use disorder    Brief Intervention  Alcohol & drug use screenings were reviewed. No concerns regarding substance use disorder identified. Other Counseling Topics:   Car/seat belt/driving safety, skin self-exam, sunscreen and calcium and vitamin D intake and regular weightbearing exercise. Vision Screening - Comments[de-identified] She sees the eye doctor this week- she has an appointment. Physical Exam:     /84 (BP Location: Left arm, Patient Position: Sitting, Cuff Size: Standard)   Pulse 60   Temp 98.5 °F (36.9 °C) (Tympanic)   Resp 16   Ht 5' 4" (1.626 m)   Wt 49.9 kg (110 lb)   LMP  (LMP Unknown)   SpO2 99%   BMI 18.88 kg/m²     Physical Exam  Constitutional:       General: She is not in acute distress. Appearance: She is well-developed. She is not diaphoretic. HENT:      Head: Normocephalic and atraumatic. Right Ear: External ear normal.      Left Ear: External ear normal.      Nose: Nose normal.      Mouth/Throat:      Pharynx: No oropharyngeal exudate. Eyes:      General: No scleral icterus. Right eye: No discharge. Left eye: No discharge. Pupils: Pupils are equal, round, and reactive to light. Neck:      Thyroid: No thyromegaly. Vascular: No JVD. Trachea: No tracheal deviation. Cardiovascular:      Rate and Rhythm: Normal rate and regular rhythm.       Heart sounds: Normal heart sounds. No murmur heard. No friction rub. No gallop. Pulmonary:      Effort: Pulmonary effort is normal. No respiratory distress. Breath sounds: Normal breath sounds. No wheezing or rales. Chest:      Chest wall: No tenderness. Abdominal:      General: Bowel sounds are normal. There is no distension. Palpations: Abdomen is soft. There is no mass. Tenderness: There is no abdominal tenderness. There is no guarding or rebound. Hernia: No hernia is present. Musculoskeletal:         General: No tenderness or deformity. Normal range of motion. Cervical back: Normal range of motion and neck supple. Lymphadenopathy:      Cervical: No cervical adenopathy. Skin:     General: Skin is warm and dry. Coloration: Skin is not pale. Findings: No erythema or rash. Neurological:      Mental Status: She is alert and oriented to person, place, and time. Cranial Nerves: No cranial nerve deficit. Sensory: No sensory deficit. Motor: No abnormal muscle tone. Coordination: Coordination normal.      Deep Tendon Reflexes: Reflexes normal.   Psychiatric:         Behavior: Behavior normal.         Thought Content:  Thought content normal.          Purnima Aviles, DO

## 2023-10-16 ENCOUNTER — IMMUNIZATIONS (OUTPATIENT)
Dept: FAMILY MEDICINE CLINIC | Facility: CLINIC | Age: 83
End: 2023-10-16
Payer: MEDICARE

## 2023-10-16 ENCOUNTER — LAB (OUTPATIENT)
Dept: LAB | Facility: CLINIC | Age: 83
End: 2023-10-16
Payer: MEDICARE

## 2023-10-16 DIAGNOSIS — I42.8 NICM (NONISCHEMIC CARDIOMYOPATHY) (HCC): ICD-10-CM

## 2023-10-16 DIAGNOSIS — R76.8 ELEVATED SERUM IMMUNOGLOBULIN FREE LIGHT CHAIN LEVEL: ICD-10-CM

## 2023-10-16 DIAGNOSIS — Z76.89 ESTABLISHING CARE WITH NEW DOCTOR, ENCOUNTER FOR: ICD-10-CM

## 2023-10-16 DIAGNOSIS — Z13.6 SCREENING FOR CARDIOVASCULAR CONDITION: ICD-10-CM

## 2023-10-16 DIAGNOSIS — Z95.0 CARDIAC PACEMAKER IN SITU: ICD-10-CM

## 2023-10-16 DIAGNOSIS — I49.3 PREMATURE VENTRICULAR COMPLEX: ICD-10-CM

## 2023-10-16 DIAGNOSIS — I10 ESSENTIAL HYPERTENSION, BENIGN: ICD-10-CM

## 2023-10-16 DIAGNOSIS — Z23 ENCOUNTER FOR IMMUNIZATION: Primary | ICD-10-CM

## 2023-10-16 DIAGNOSIS — Z13.1 SCREENING FOR DIABETES MELLITUS: ICD-10-CM

## 2023-10-16 DIAGNOSIS — I50.20 HEART FAILURE WITH REDUCED LEFT VENTRICULAR FUNCTION (HCC): ICD-10-CM

## 2023-10-16 LAB
ALBUMIN SERPL BCP-MCNC: 4.1 G/DL (ref 3.5–5)
ALP SERPL-CCNC: 38 U/L (ref 34–104)
ALT SERPL W P-5'-P-CCNC: 20 U/L (ref 7–52)
ANION GAP SERPL CALCULATED.3IONS-SCNC: 9 MMOL/L
AST SERPL W P-5'-P-CCNC: 28 U/L (ref 13–39)
BASOPHILS # BLD AUTO: 0.1 THOUSANDS/ÂΜL (ref 0–0.1)
BASOPHILS NFR BLD AUTO: 1 % (ref 0–1)
BILIRUB SERPL-MCNC: 0.55 MG/DL (ref 0.2–1)
BUN SERPL-MCNC: 24 MG/DL (ref 5–25)
CALCIUM SERPL-MCNC: 9.9 MG/DL (ref 8.4–10.2)
CHLORIDE SERPL-SCNC: 103 MMOL/L (ref 96–108)
CHOLEST SERPL-MCNC: 159 MG/DL
CO2 SERPL-SCNC: 25 MMOL/L (ref 21–32)
CREAT SERPL-MCNC: 0.95 MG/DL (ref 0.6–1.3)
EOSINOPHIL # BLD AUTO: 0.13 THOUSAND/ÂΜL (ref 0–0.61)
EOSINOPHIL NFR BLD AUTO: 2 % (ref 0–6)
ERYTHROCYTE [DISTWIDTH] IN BLOOD BY AUTOMATED COUNT: 14.6 % (ref 11.6–15.1)
GFR SERPL CREATININE-BSD FRML MDRD: 55 ML/MIN/1.73SQ M
GLUCOSE P FAST SERPL-MCNC: 91 MG/DL (ref 65–99)
HCT VFR BLD AUTO: 41.2 % (ref 34.8–46.1)
HDLC SERPL-MCNC: 57 MG/DL
HGB BLD-MCNC: 12.7 G/DL (ref 11.5–15.4)
IGA SERPL-MCNC: 177 MG/DL (ref 66–433)
IGG SERPL-MCNC: 1358 MG/DL (ref 635–1741)
IGM SERPL-MCNC: 76 MG/DL (ref 45–281)
IMM GRANULOCYTES # BLD AUTO: 0.01 THOUSAND/UL (ref 0–0.2)
IMM GRANULOCYTES NFR BLD AUTO: 0 % (ref 0–2)
LDLC SERPL CALC-MCNC: 84 MG/DL (ref 0–100)
LYMPHOCYTES # BLD AUTO: 1.72 THOUSANDS/ÂΜL (ref 0.6–4.47)
LYMPHOCYTES NFR BLD AUTO: 23 % (ref 14–44)
MCH RBC QN AUTO: 31 PG (ref 26.8–34.3)
MCHC RBC AUTO-ENTMCNC: 30.8 G/DL (ref 31.4–37.4)
MCV RBC AUTO: 101 FL (ref 82–98)
MONOCYTES # BLD AUTO: 0.53 THOUSAND/ÂΜL (ref 0.17–1.22)
MONOCYTES NFR BLD AUTO: 7 % (ref 4–12)
NEUTROPHILS # BLD AUTO: 4.89 THOUSANDS/ÂΜL (ref 1.85–7.62)
NEUTS SEG NFR BLD AUTO: 67 % (ref 43–75)
NONHDLC SERPL-MCNC: 102 MG/DL
NRBC BLD AUTO-RTO: 0 /100 WBCS
PLATELET # BLD AUTO: 152 THOUSANDS/UL (ref 149–390)
PMV BLD AUTO: 12.7 FL (ref 8.9–12.7)
POTASSIUM SERPL-SCNC: 5 MMOL/L (ref 3.5–5.3)
PROT SERPL-MCNC: 7.1 G/DL (ref 6.4–8.4)
RBC # BLD AUTO: 4.1 MILLION/UL (ref 3.81–5.12)
SODIUM SERPL-SCNC: 137 MMOL/L (ref 135–147)
TRIGL SERPL-MCNC: 92 MG/DL
TSH SERPL DL<=0.05 MIU/L-ACNC: 1.63 UIU/ML (ref 0.45–4.5)
WBC # BLD AUTO: 7.38 THOUSAND/UL (ref 4.31–10.16)

## 2023-10-16 PROCEDURE — 82784 ASSAY IGA/IGD/IGG/IGM EACH: CPT

## 2023-10-16 PROCEDURE — 84443 ASSAY THYROID STIM HORMONE: CPT

## 2023-10-16 PROCEDURE — 80053 COMPREHEN METABOLIC PANEL: CPT

## 2023-10-16 PROCEDURE — 84165 PROTEIN E-PHORESIS SERUM: CPT

## 2023-10-16 PROCEDURE — 80061 LIPID PANEL: CPT

## 2023-10-16 PROCEDURE — 85025 COMPLETE CBC W/AUTO DIFF WBC: CPT

## 2023-10-16 PROCEDURE — 83521 IG LIGHT CHAINS FREE EACH: CPT

## 2023-10-16 PROCEDURE — 82232 ASSAY OF BETA-2 PROTEIN: CPT

## 2023-10-16 PROCEDURE — 90662 IIV NO PRSV INCREASED AG IM: CPT

## 2023-10-16 PROCEDURE — G0008 ADMIN INFLUENZA VIRUS VAC: HCPCS

## 2023-10-16 PROCEDURE — 36415 COLL VENOUS BLD VENIPUNCTURE: CPT

## 2023-10-17 LAB
KAPPA LC FREE SER-MCNC: 41.6 MG/L (ref 3.3–19.4)
KAPPA LC FREE/LAMBDA FREE SER: 2.45 {RATIO} (ref 0.26–1.65)
LAMBDA LC FREE SERPL-MCNC: 17 MG/L (ref 5.7–26.3)

## 2023-10-18 LAB
ALBUMIN SERPL ELPH-MCNC: 3.96 G/DL (ref 3.2–5.1)
ALBUMIN SERPL ELPH-MCNC: 58.3 % (ref 48–70)
ALPHA1 GLOB SERPL ELPH-MCNC: 0.24 G/DL (ref 0.15–0.47)
ALPHA1 GLOB SERPL ELPH-MCNC: 3.6 % (ref 1.8–7)
ALPHA2 GLOB SERPL ELPH-MCNC: 0.73 G/DL (ref 0.42–1.04)
ALPHA2 GLOB SERPL ELPH-MCNC: 10.8 % (ref 5.9–14.9)
BETA GLOB ABNORMAL SERPL ELPH-MCNC: 0.38 G/DL (ref 0.31–0.57)
BETA1 GLOB SERPL ELPH-MCNC: 5.6 % (ref 4.7–7.7)
BETA2 GLOB SERPL ELPH-MCNC: 3.8 % (ref 3.1–7.9)
BETA2+GAMMA GLOB SERPL ELPH-MCNC: 0.26 G/DL (ref 0.2–0.58)
GAMMA GLOB ABNORMAL SERPL ELPH-MCNC: 1.22 G/DL (ref 0.4–1.66)
GAMMA GLOB SERPL ELPH-MCNC: 17.9 % (ref 6.9–22.3)
IGG/ALB SER: 1.4 {RATIO} (ref 1.1–1.8)
PROT PATTERN SERPL ELPH-IMP: NORMAL
PROT SERPL-MCNC: 6.8 G/DL (ref 6.4–8.4)

## 2023-10-18 PROCEDURE — 84165 PROTEIN E-PHORESIS SERUM: CPT | Performed by: PATHOLOGY

## 2023-10-19 LAB — B2 MICROGLOB SERPL-MCNC: 3.7 MG/L (ref 0.6–2.4)

## 2023-10-25 DIAGNOSIS — N25.81 SECONDARY HYPERPARATHYROIDISM OF RENAL ORIGIN (HCC): ICD-10-CM

## 2023-10-25 DIAGNOSIS — E55.9 VITAMIN D DEFICIENCY: ICD-10-CM

## 2023-10-25 DIAGNOSIS — I10 PRIMARY HYPERTENSION: ICD-10-CM

## 2023-10-25 DIAGNOSIS — I50.22 CHRONIC SYSTOLIC CONGESTIVE HEART FAILURE (HCC): ICD-10-CM

## 2023-10-25 DIAGNOSIS — N18.31 STAGE 3A CHRONIC KIDNEY DISEASE (HCC): ICD-10-CM

## 2023-10-25 RX ORDER — CALCITRIOL 0.25 UG/1
0.25 CAPSULE, LIQUID FILLED ORAL 3 TIMES WEEKLY
Qty: 48 CAPSULE | Refills: 4 | Status: SHIPPED | OUTPATIENT
Start: 2023-10-25

## 2023-10-25 NOTE — TELEPHONE ENCOUNTER
rMedication Refills   Person Calling In  Name if not patient Patient    Medication name Calcitiol    Medication Dosage  Medication Frequency 0.25mcg  Three times a week. WESLEY Wilde   Pharmacy & Location [facility]    Additional Information She wants to know how long she is suppose to stay on them?

## 2023-10-26 ENCOUNTER — TELEPHONE (OUTPATIENT)
Dept: HEMATOLOGY ONCOLOGY | Facility: CLINIC | Age: 83
End: 2023-10-26

## 2023-10-26 NOTE — TELEPHONE ENCOUNTER
Appointment Change  Cancel, Reschedule, Change to Virtual      Who are you speaking with? Patient   If it is not the patient, is the caller listed on the communication consent form? N/A   Which provider is the appointment scheduled with? CARA Valenzuela   When was the original appointment scheduled? Please list date and time 10/26/23 11am   At which location is the appointment scheduled to take place? [place]   Was the appointment rescheduled? Was the appointment changed from an in person visit to a virtual visit? If so, please list the details of the change. 10/27/23 930   What is the reason for the appointment change? Pt didn't know about appt today       Was STAR transport scheduled? N/A   Does STAR transport need to be scheduled for the new visit (if applicable) N/A   Does the patient need an infusion appointment rescheduled? N/A   Does the patient have an upcoming infusion appointment scheduled? If so, when? No   Is the patient undergoing chemotherapy? N/A   For appointments cancelled with less than 24 hours:  Was the no-show policy reviewed?  Yes

## 2023-10-27 ENCOUNTER — OFFICE VISIT (OUTPATIENT)
Age: 83
End: 2023-10-27
Payer: MEDICARE

## 2023-10-27 VITALS
WEIGHT: 111 LBS | HEIGHT: 64 IN | RESPIRATION RATE: 16 BRPM | BODY MASS INDEX: 18.95 KG/M2 | HEART RATE: 61 BPM | DIASTOLIC BLOOD PRESSURE: 80 MMHG | TEMPERATURE: 97.5 F | OXYGEN SATURATION: 97 % | SYSTOLIC BLOOD PRESSURE: 148 MMHG

## 2023-10-27 DIAGNOSIS — R76.8 ELEVATED SERUM IMMUNOGLOBULIN FREE LIGHT CHAIN LEVEL: Primary | ICD-10-CM

## 2023-10-27 PROCEDURE — 99213 OFFICE O/P EST LOW 20 MIN: CPT | Performed by: NURSE PRACTITIONER

## 2023-10-27 NOTE — PROGRESS NOTES
HEMATOLOGY / [facility] FOLLOW UP NOTE    Primary Care Provider: Ximena Butt DO  Referring Provider:    MRN: [id number]  : 1940    Reason for Encounter: Follow-up for elevated serum immunoglobulin free light chain level       Interval History: Patient returns for follow-up visit. Blood work completed on 10/16/2023 reviewed. Ig kappa free light chain remains elevated but overall improved. Down from 76 in April to 41.6. Free light chain ratio 2.45  CBC-D, CMP, immunoglobulins are normal.  SPEP negative for monoclonal gammopathy  She reports she is in her normal state of health. Continues to follow closely with nephrology and cardiology. REVIEW OF SYSTEMS:  Please note that a 14-point review of systems was performed to include Constitutional, HEENT, Respiratory, CVS, GI, , Musculoskeletal, Integumentary, Neurologic, Rheumatologic, Endocrinologic, Psychiatric, Lymphatic, and Hematologic/Oncologic systems were reviewed and are negative unless otherwise stated in HPI. Positive and negative findings pertinent to this evaluation are incorporated into the history of present illness. ECOG PS: 0    PROBLEM LIST:  Patient Active Problem List   Diagnosis    Allergic rhinitis    Cardiomyopathy (HCC)    CHF (congestive heart failure) (HCC)    Cough, persistent    GERD (gastroesophageal reflux disease)    Hypertension    Osteoporosis    TMJ syndrome    Positive 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 disease    AICD (automatic cardioverter/defibrillator) present    Other specified glaucoma    Primary osteoarthritis of right knee    Environmental and seasonal allergies    Chronic pain of left knee    Primary osteoarthritis of left knee    Leg length discrepancy    Tricuspid regurgitation    PVC's (premature ventricular contractions)    Stage 3a chronic kidney disease (HCC)    Vitamin D deficiency    Vasomotor rhinitis    Secondary hyperparathyroidism of renal origin ([address])       Assessment / Plan:  1. Elevated serum immunoglobulin free light chain level      Patient was referred to hematology for elevated serum free light chain ratio in the setting of CKD. She had no evidence of endorgan damage at presentation. Most recent blood work continues to show an elevated serum immunoglobulin free light chain level without any evidence of anemia, worsening renal insufficiency, hypercalcemia or monoclonal gammopathy. Abnormalities on SPEP are secondary to CKD. Without any evidence of monoclonal gammopathy, there is no indication of any bone marrow dysfunction. Recommendation would be to follow blood work on a yearly basis. Patient verbalized understanding and agreement. I will see her back in 1 year with repeat myeloma labs. She will continue to follow closely with her cardiologist and nephrologist.  She is instructed to call anytime with questions or concerns in the interim    I spent 25 minutes on chart review, face to face counseling time, coordination of care and documentation. Past Medical History:   has a past medical history of Acute otitis externa of left ear (4/12/2022), Cardiomyopathy ([address]), Chronic diarrhea of unknown origin, Contact dermatitis, Esophageal reflux, Helicobacter pylori gastrointestinal tract infection (10/25/2018), Hyperlipidemia, Muscle weakness (generalized), Pacemaker, Post-viral cough syndrome (12/12/2022), and Reactive airway disease. PAST SURGICAL HISTORY:   has a past surgical history that includes Leg Surgery (Left) and Other surgical history.     CURRENT MEDICATIONS  Current Outpatient Medications   Medication Sig Dispense Refill    aspirin 81 MG tablet Take 1 tablet by mouth daily      calcitriol (ROCALTROL) 0.25 mcg capsule Take 1 capsule (0.25 mcg total) by mouth 3 (three) times a week 48 capsule 4    denosumab (Prolia) 60 mg/mL as directed      furosemide (LASIX) 40 mg tablet Take 40 mg by mouth daily        latanoprost (XALATAN) 0.005 % ophthalmic solution loratadine (CLARITIN) 10 mg tablet Take 1 tablet (10 mg total) by mouth daily 30 tablet 3    losartan (COZAAR) 100 MG tablet Take 1 tablet (100 mg total) by mouth every 24 hours 90 tablet 2    metoprolol succinate (TOPROL-XL) 25 mg 24 hr tablet Take 1 tablet (25 mg total) by mouth daily 90 tablet 2    timolol (TIMOPTIC) 0.5 % ophthalmic solution       amiodarone 200 mg tablet Take 0.5 tablets (100 mg total) by mouth daily (Patient not taking: Reported on 10/27/2023) 45 tablet 3     No current facility-administered medications for this visit. [unfilled]    SOCIAL HISTORY:   reports that she has never smoked. She has never used smokeless tobacco. She reports that she does not drink alcohol and does not use drugs. FAMILY HISTORY:  family history includes Diabetes in her daughter; Heart disease in her father; Mental illness in her daughter. ALLERGIES:  has No Known Allergies. Physical Exam:  Vital Signs:   Visit Vitals  /80 (BP Location: Left arm, Patient Position: Sitting, Cuff Size: Standard)   Pulse 61   Temp 97.5 °F (36.4 °C) (Temporal)   Resp 16   Ht 5' 4" (1.626 m)   Wt 50.3 kg (111 lb)   LMP  (LMP Unknown)   SpO2 97%   BMI 19.05 kg/m²   OB Status Postmenopausal   Smoking Status Never   BSA 1.52 m²     Body mass index is 19.05 kg/m². Body surface area is 1.52 meters squared. Physical Exam  Constitutional:       General: She is not in acute distress. Appearance: She is not toxic-appearing. HENT:      Head: Normocephalic and atraumatic. Mouth/Throat:      Pharynx: No oropharyngeal exudate. Eyes:      General: No scleral icterus. Cardiovascular:      Rate and Rhythm: Normal rate and regular rhythm. Pulmonary:      Effort: Pulmonary effort is normal.      Breath sounds: Normal breath sounds. Musculoskeletal:         General: Normal range of motion. Comments: Ambulates with cane   Skin:     General: Skin is warm and dry.    Neurological:      General: No focal deficit present. Mental Status: She is alert and oriented to person, place, and time.    Psychiatric:         Mood and Affect: Mood normal.         Behavior: Behavior normal.         Labs:  Lab Results   Component Value Date    WBC 7.38 10/16/2023    HGB 12.7 10/16/2023    HCT 41.2 10/16/2023     (H) 10/16/2023     10/16/2023     Lab Results   Component Value Date     01/04/2016    SODIUM 137 10/16/2023    K 5.0 10/16/2023     10/16/2023    CO2 25 10/16/2023    ANIONGAP 6 01/04/2016    AGAP 9 10/16/2023    BUN 24 10/16/2023    CREATININE 0.95 10/16/2023    GLUC 97 04/04/2023    GLUF 91 10/16/2023    CALCIUM 9.9 10/16/2023    AST 28 10/16/2023    ALT 20 10/16/2023    ALKPHOS 38 10/16/2023    PROT 7.3 09/21/2015    TP 7.1 10/16/2023    TP 6.8 10/16/2023    BILITOT 0.30 09/21/2015    TBILI 0.55 10/16/2023    EGFR 55 10/16/2023

## 2023-10-31 ENCOUNTER — OFFICE VISIT (OUTPATIENT)
Dept: OBGYN CLINIC | Facility: CLINIC | Age: 83
End: 2023-10-31
Payer: MEDICARE

## 2023-10-31 VITALS
DIASTOLIC BLOOD PRESSURE: 82 MMHG | SYSTOLIC BLOOD PRESSURE: 142 MMHG | BODY MASS INDEX: 18.95 KG/M2 | WEIGHT: 111 LBS | HEIGHT: 64 IN

## 2023-10-31 DIAGNOSIS — M17.11 PRIMARY OSTEOARTHRITIS OF RIGHT KNEE: Primary | ICD-10-CM

## 2023-10-31 PROCEDURE — 20610 DRAIN/INJ JOINT/BURSA W/O US: CPT | Performed by: ORTHOPAEDIC SURGERY

## 2023-10-31 PROCEDURE — 99213 OFFICE O/P EST LOW 20 MIN: CPT | Performed by: ORTHOPAEDIC SURGERY

## 2023-10-31 RX ORDER — LIDOCAINE HYDROCHLORIDE 10 MG/ML
7 INJECTION, SOLUTION INFILTRATION; PERINEURAL
Status: COMPLETED | OUTPATIENT
Start: 2023-10-31 | End: 2023-10-31

## 2023-10-31 RX ORDER — BETAMETHASONE SODIUM PHOSPHATE AND BETAMETHASONE ACETATE 3; 3 MG/ML; MG/ML
6 INJECTION, SUSPENSION INTRA-ARTICULAR; INTRALESIONAL; INTRAMUSCULAR; SOFT TISSUE
Status: COMPLETED | OUTPATIENT
Start: 2023-10-31 | End: 2023-10-31

## 2023-10-31 RX ADMIN — LIDOCAINE HYDROCHLORIDE 7 ML: 10 INJECTION, SOLUTION INFILTRATION; PERINEURAL at 10:45

## 2023-10-31 RX ADMIN — BETAMETHASONE SODIUM PHOSPHATE AND BETAMETHASONE ACETATE 6 MG: 3; 3 INJECTION, SUSPENSION INTRA-ARTICULAR; INTRALESIONAL; INTRAMUSCULAR; SOFT TISSUE at 10:45

## 2023-10-31 NOTE — PROGRESS NOTES
Assessment:     1. Primary osteoarthritis of right knee        Plan:     Problem List Items Addressed This Visit          Musculoskeletal and Integument    Primary osteoarthritis of right knee - Primary     Findings consistent with right knee severe medial osteoarthritis. Patient is not ready for right total knee replacement and wants to continue with conservative treatment for right knee. She was given cortisone injection, tolerated well, cold compress today. Continue low impact exercises. Continue brace with a cane. Follow-up as needed if symptoms return. Advised patient as soon as she can have  another cortisone injection would be in 3 to 4 months. All patient's questions were answered to her satisfaction. This note is created using dictation transcription. It may contain typographical errors, grammatical errors, improperly dictated words, background noise and other errors. Relevant Medications    lidocaine (XYLOCAINE) 1 % injection 7 mL (Completed)    betamethasone acetate-betamethasone sodium phosphate (CELESTONE) injection 6 mg (Completed)    Other Relevant Orders    Large joint arthrocentesis: R knee (Completed)       Subjective:     Patient ID: Ally Raines is a 80 y.o. female. Chief Complaint:  80 y.o. female presents to the office for follow up of right knee osteoarthritis. At last visit she received a cortisone injection to the right knee on 727/23. Injection lasted for close to 2 months. She states the pain has been progressively worsening for the past 4 weeks. She denies any new injury. The pain can become worse with weather changes. She would like a repeat cortisone injection today.        Allergy:  No Known Allergies  Medications:  all current active meds have been reviewed  Past Medical History:  Past Medical History:   Diagnosis Date    Acute otitis externa of left ear 4/12/2022    Cardiomyopathy ([address])     Chronic diarrhea of unknown origin     resolved 12/22/2015    Contact dermatitis     last assessed 04/19/2013    Esophageal reflux     resolved 51/88/7166    Helicobacter pylori gastrointestinal tract infection 10/25/2018    Hyperlipidemia     resolved 12/22/2015    Muscle weakness (generalized)     resolved 12/22/2015    Pacemaker     Post-viral cough syndrome 12/12/2022    Reactive airway disease     resolved 12/22/2015     Past Surgical History:  Past Surgical History:   Procedure Laterality Date    LEG SURGERY Left     OTHER SURGICAL HISTORY      open treatment of weight bearing distal tibial fracture     Family History:  Family History   Problem Relation Age of Onset    Heart disease Father     Diabetes Daughter     Mental illness Daughter         disorder     Social History:  Social History     Substance and Sexual Activity   Alcohol Use No    Comment: denies alcohol use causing problems     Social History     Substance and Sexual Activity   Drug Use No     Social History     Tobacco Use   Smoking Status Never   Smokeless Tobacco Never     Review of Systems   Constitutional:  Negative for chills and fever. HENT:  Negative for drooling and sneezing. Eyes:  Negative for redness. Respiratory:  Negative for cough and wheezing. Gastrointestinal:  Negative for nausea and vomiting. Endocrine: Negative. Genitourinary: Negative. Musculoskeletal:  Positive for arthralgias (right knee), gait problem (uses a cane) and joint swelling (Right knee). Psychiatric/Behavioral:  Negative for behavioral problems. The patient is not nervous/anxious. Objective:  BP Readings from Last 1 Encounters:   10/31/23 142/82      Wt Readings from Last 1 Encounters:   10/31/23 50.3 kg (111 lb)      BMI:   Estimated body mass index is 19.05 kg/m² as calculated from the following:    Height as of this encounter: 5' 4" (1.626 m). Weight as of this encounter: 50.3 kg (111 lb).   BSA:   Estimated body surface area is 1.52 meters squared as calculated from the following:    Height as of this encounter: 5' 4" (1.626 m). Weight as of this encounter: 50.3 kg (111 lb). Physical Exam  Vitals and nursing note reviewed. Constitutional:       Appearance: Normal appearance. She is well-developed. HENT:      Head: Normocephalic and atraumatic. Right Ear: External ear normal.      Left Ear: External ear normal.   Eyes:      Extraocular Movements: Extraocular movements intact. Conjunctiva/sclera: Conjunctivae normal.   Neck:      Trachea: No tracheal deviation. Pulmonary:      Effort: Pulmonary effort is normal.   Musculoskeletal:      Cervical back: Neck supple. Right knee: No effusion. Instability Tests: Medial Fanny test negative and lateral Fanny test negative. Skin:     General: Skin is warm and dry. Neurological:      Mental Status: She is alert and oriented to person, place, and time. Deep Tendon Reflexes: Reflexes are normal and symmetric. Psychiatric:         Mood and Affect: Mood normal.         Behavior: Behavior normal.       Right Knee Exam     Tenderness   The patient is experiencing tenderness in the medial joint line. Range of Motion   Extension:  normal   Flexion:  110     Tests   Fanny:  Medial - negative Lateral - negative  Varus: negative Valgus: negative  Patellar apprehension: negative    Other   Erythema: absent  Sensation: normal  Pulse: present  Swelling: none  Effusion: no effusion present    Comments:  Varus alignment  Patellofemoral joint crepitation with knee motion            No new images to review today       Large joint arthrocentesis: R knee  Universal Protocol:  Consent: Verbal consent obtained.   Risks and benefits: risks, benefits and alternatives were discussed  Consent given by: patient  Patient understanding: patient states understanding of the procedure being performed  Supporting Documentation  Indications: pain   Procedure Details  Location: knee - R knee  Preparation: Patient was prepped and draped in the usual sterile fashion  Needle size: 22 G  Ultrasound guidance: no  Approach: anterolateral  Medications administered: 7 mL lidocaine 1 %; 6 mg betamethasone acetate-betamethasone sodium phosphate 6 (3-3) mg/mL    Patient tolerance: patient tolerated the procedure well with no immediate complications  Dressing:  Sterile dressing applied

## 2023-10-31 NOTE — ASSESSMENT & PLAN NOTE
Findings consistent with right knee severe medial osteoarthritis. Patient is not ready for right total knee replacement and wants to continue with conservative treatment for right knee. She was given cortisone injection, tolerated well, cold compress today. Continue low impact exercises. Continue brace with a cane. Follow-up as needed if symptoms return. Advised patient as soon as she can have  another cortisone injection would be in 3 to 4 months. All patient's questions were answered to her satisfaction. This note is created using dictation transcription. It may contain typographical errors, grammatical errors, improperly dictated words, background noise and other errors.

## 2023-11-29 ENCOUNTER — HOSPITAL ENCOUNTER (OUTPATIENT)
Dept: NON INVASIVE DIAGNOSTICS | Facility: HOSPITAL | Age: 83
Discharge: HOME | End: 2023-11-29
Payer: MEDICARE

## 2023-11-29 VITALS
SYSTOLIC BLOOD PRESSURE: 142 MMHG | DIASTOLIC BLOOD PRESSURE: 82 MMHG | HEIGHT: 64 IN | BODY MASS INDEX: 18.95 KG/M2 | WEIGHT: 111 LBS | HEART RATE: 60 BPM

## 2023-11-29 DIAGNOSIS — N25.81 SECONDARY HYPERPARATHYROIDISM OF RENAL ORIGIN (HCC): ICD-10-CM

## 2023-11-29 LAB
AORTIC ROOT: 3.3 CM
AORTIC VALVE MEAN VELOCITY: 7.8 M/S
APICAL FOUR CHAMBER EJECTION FRACTION: 37 %
ASCENDING AORTA: 3.9 CM
AV AREA BY CONTINUOUS VTI: 2.3 CM2
AV AREA PEAK VELOCITY: 2.3 CM2
AV LVOT MEAN GRADIENT: 1 MMHG
AV LVOT PEAK GRADIENT: 2 MMHG
AV MEAN GRADIENT: 3 MMHG
AV PEAK GRADIENT: 5 MMHG
AV REGURGITATION PRESSURE HALF TIME: 569 MS
AV VALVE AREA: 2.32 CM2
DOP CALC AO VTI: 23.9 CM
DOP CALC LVOT AREA: 3.8
DOP CALC LVOT DIAMETER: 2.2 CM
DOP CALC LVOT PEAK VEL VTI: 14.6 CM
DOP CALC LVOT PEAK VEL: 0.68 M/S
DOP CALC LVOT STROKE INDEX: 36.2 ML/M2
DOP CALC LVOT STROKE VOLUME: 55.47
DOP CALC MV VTI: 22.5 CM
E WAVE DECELERATION TIME: 184 MS
E/A RATIO: 1.43
FRACTIONAL SHORTENING: 17 (ref 28–44)
GLOBAL LONGITUIDAL STRAIN: -9 %
INTERVENTRICULAR SEPTUM IN DIASTOLE (PARASTERNAL SHORT AXIS VIEW): 0.6 CM
INTERVENTRICULAR SEPTUM: 0.6 CM (ref 0.6–1.1)
LA/AORTA RATIO 2D: 1.12
LAAS-AP2: 23.4 CM2
LAAS-AP4: 20.4 CM2
LEFT ATRIUM SIZE: 3.7 CM
LEFT ATRIUM VOLUME (MOD BIPLANE): 70 ML
LEFT ATRIUM VOLUME INDEX (MOD BIPLANE): 46.1 ML/M2
LEFT INTERNAL DIMENSION IN SYSTOLE: 4.9 CM (ref 2.1–4)
LEFT VENTRICLE DIASTOLIC VOLUME (MOD BIPLANE): 148 ML
LEFT VENTRICLE SYSTOLIC VOLUME (MOD BIPLANE): 108 ML
LEFT VENTRICULAR INTERNAL DIMENSION IN DIASTOLE: 5.9 CM (ref 3.5–6)
LEFT VENTRICULAR POSTERIOR WALL IN END DIASTOLE: 0.6 CM
LEFT VENTRICULAR STROKE VOLUME: 61 ML
LV EF: 27 %
LVSV (TEICH): 61 ML
MV E'TISSUE VEL-LAT: 3 CM/S
MV E'TISSUE VEL-SEP: 4 CM/S
MV MEAN GRADIENT: 1 MMHG
MV PEAK A VEL: 0.6 M/S
MV PEAK E VEL: 86 CM/S
MV PEAK GRADIENT: 3 MMHG
MV STENOSIS PRESSURE HALF TIME: 54 MS
MV VALVE AREA BY CONTINUITY EQUATION: 2.47 CM2
MV VALVE AREA P 1/2 METHOD: 4.07
RA PRESSURE ESTIMATED: 8 MMHG
RIGHT VENTRICLE ID DIMENSION: 4.1 CM
RV PSP: 52 MMHG
SL CV AV PEAK GRADIENT RETROGRADE: 77 MMHG
SL CV LV EF: 30
SL CV PED ECHO LEFT VENTRICLE DIASTOLIC VOLUME (MOD BIPLANE) 2D: 172 ML
SL CV PED ECHO LEFT VENTRICLE SYSTOLIC VOLUME (MOD BIPLANE) 2D: 111 ML
TR MAX PG: 44 MMHG
TR PEAK VELOCITY: 3.3 M/S
TRICUSPID ANNULAR PLANE SYSTOLIC EXCURSION: 2.2 CM
TRICUSPID VALVE PEAK REGURGITATION VELOCITY: 3.32 M/S

## 2023-11-29 PROCEDURE — 93306 TTE W/DOPPLER COMPLETE: CPT | Performed by: INTERNAL MEDICINE

## 2023-11-29 PROCEDURE — 93356 MYOCRD STRAIN IMG SPCKL TRCK: CPT

## 2023-11-29 PROCEDURE — 93356 MYOCRD STRAIN IMG SPCKL TRCK: CPT | Performed by: INTERNAL MEDICINE

## 2023-11-29 PROCEDURE — 93306 TTE W/DOPPLER COMPLETE: CPT

## 2023-12-27 ENCOUNTER — TELEPHONE (OUTPATIENT)
Dept: NEPHROLOGY | Facility: CLINIC | Age: 83
End: 2023-12-27

## 2023-12-27 NOTE — TELEPHONE ENCOUNTER
Pt has a 2/12/24 appt with Dr. Elizalde that needs to be rescheduled. Provider will be at the dialysis center. LVM to return call to reschedule.

## 2024-02-01 ENCOUNTER — OFFICE VISIT (OUTPATIENT)
Dept: OBGYN CLINIC | Facility: CLINIC | Age: 84
End: 2024-02-01
Payer: MEDICARE

## 2024-02-01 VITALS
SYSTOLIC BLOOD PRESSURE: 122 MMHG | BODY MASS INDEX: 19.12 KG/M2 | WEIGHT: 112 LBS | HEIGHT: 64 IN | DIASTOLIC BLOOD PRESSURE: 82 MMHG

## 2024-02-01 DIAGNOSIS — M17.11 PRIMARY OSTEOARTHRITIS OF RIGHT KNEE: Primary | ICD-10-CM

## 2024-02-01 PROCEDURE — 99213 OFFICE O/P EST LOW 20 MIN: CPT | Performed by: ORTHOPAEDIC SURGERY

## 2024-02-01 NOTE — PROGRESS NOTES
Assessment:     1. Primary osteoarthritis of right knee        Plan:     Problem List Items Addressed This Visit          Musculoskeletal and Integument    Primary osteoarthritis of right knee - Primary     Findings consistent with advanced right knee osteoarthritis, bone-on-bone medially.  Findings and treatment options were discussed with the patient.  Unfortunately, she did not get long-term relief for the last cortisone injection.  She got more relief with the joint supplement injections.  Discussed that since she is not getting long-term relief with conservative treatment, she would be a candidate for a total knee arthroplasty.  Patient wants to avoid surgery.  She would like to proceed in doing another series of the joint supplement injections at this time.  We will order the medication and she will follow-up after insurance authorization to begin the series.  All patient's questions were answered to her satisfaction.  This note is created using dictation transcription.  It may contain typographical errors, grammatical errors, improperly dictated words, background noise and other errors.         Relevant Orders    Injection Procedure Prior Authorization       Subjective:     Patient ID: Michaela Cruz is a 83 y.o. female.  Chief Complaint:  83 y.o. female presents to the office for follow up of right knee osteoarthritis.  Last seen in October 2023 and given a cortisone injection.  She states she only had 1 week of relief.  She had joint supplement injections in 2022 and states she had about 2 months relief with those.  She feels the weather changes affect her knee the most.  She denies any injury.  She wants to avoid total knee replacement at all costs.    Allergy:  No Known Allergies  Medications:  all current active meds have been reviewed  Past Medical History:  Past Medical History:   Diagnosis Date    Acute otitis externa of left ear 4/12/2022    Cardiomyopathy ([facility])     Chronic diarrhea of unknown origin   "   resolved 12/22/2015    Contact dermatitis     last assessed 04/19/2013    Esophageal reflux     resolved 07/01/2016    Helicobacter pylori gastrointestinal tract infection 10/25/2018    Hyperlipidemia     resolved 12/22/2015    Muscle weakness (generalized)     resolved 12/22/2015    Pacemaker     Post-viral cough syndrome 12/12/2022    Reactive airway disease     resolved 12/22/2015     Past Surgical History:  Past Surgical History:   Procedure Laterality Date    LEG SURGERY Left     OTHER SURGICAL HISTORY      open treatment of weight bearing distal tibial fracture     Family History:  Family History   Problem Relation Age of Onset    Heart disease Father     Diabetes Daughter     Mental illness Daughter         disorder     Social History:  Social History     Substance and Sexual Activity   Alcohol Use No    Comment: denies alcohol use causing problems     Social History     Substance and Sexual Activity   Drug Use No     Social History     Tobacco Use   Smoking Status Never   Smokeless Tobacco Never     Review of Systems   Constitutional:  Negative for chills and fever.   HENT:  Negative for drooling and sneezing.    Eyes:  Negative for redness.   Respiratory:  Negative for cough and wheezing.    Gastrointestinal:  Negative for nausea and vomiting.   Endocrine: Negative.    Genitourinary: Negative.    Musculoskeletal:  Positive for arthralgias (right knee) and gait problem (uses a cane). Negative for joint swelling (Right knee).   Psychiatric/Behavioral:  Negative for behavioral problems. The patient is not nervous/anxious.          Objective:  BP Readings from Last 1 Encounters:   02/01/24 122/82      Wt Readings from Last 1 Encounters:   02/01/24 50.8 kg (112 lb)      BMI:   Estimated body mass index is 19.22 kg/m² as calculated from the following:    Height as of this encounter: 5' 4\" (1.626 m).    Weight as of this encounter: 50.8 kg (112 lb).  BSA:   Estimated body surface area is 1.53 meters squared as " "calculated from the following:    Height as of this encounter: 5' 4\" (1.626 m).    Weight as of this encounter: 50.8 kg (112 lb).   Physical Exam  Vitals and nursing note reviewed.   Constitutional:       Appearance: Normal appearance. She is well-developed.   HENT:      Head: Normocephalic and atraumatic.      Right Ear: External ear normal.      Left Ear: External ear normal.   Eyes:      Extraocular Movements: Extraocular movements intact.      Conjunctiva/sclera: Conjunctivae normal.   Neck:      Trachea: No tracheal deviation.   Pulmonary:      Effort: Pulmonary effort is normal.   Musculoskeletal:      Cervical back: Neck supple.      Right knee: No effusion.      Instability Tests: Medial Fanny test negative and lateral Fanny test negative.   Skin:     General: Skin is warm and dry.   Neurological:      Mental Status: She is alert and oriented to person, place, and time.      Deep Tendon Reflexes: Reflexes are normal and symmetric.   Psychiatric:         Mood and Affect: Mood normal.         Behavior: Behavior normal.       Right Knee Exam     Tenderness   The patient is experiencing tenderness in the medial joint line.    Range of Motion   Extension:  normal   Flexion:  110     Tests   Fanny:  Medial - negative Lateral - negative  Varus: negative Valgus: negative  Patellar apprehension: negative    Other   Erythema: absent  Sensation: normal  Pulse: present  Swelling: none  Effusion: no effusion present    Comments:  Varus alignment  Patellofemoral joint crepitation with knee motion            No new images to review today       Scribe Attestation      I,:  Jami Aguero PA-C am acting as a scribe while in the presence of the attending physician.:       I,:  Celia Du MD personally performed the services described in this documentation    as scribed in my presence.:                  "

## 2024-02-01 NOTE — ASSESSMENT & PLAN NOTE
Findings consistent with advanced right knee osteoarthritis, bone-on-bone medially.  Findings and treatment options were discussed with the patient.  Unfortunately, she did not get long-term relief for the last cortisone injection.  She got more relief with the joint supplement injections.  Discussed that since she is not getting long-term relief with conservative treatment, she would be a candidate for a total knee arthroplasty.  Patient wants to avoid surgery.  She would like to proceed in doing another series of the joint supplement injections at this time.  We will order the medication and she will follow-up after insurance authorization to begin the series.  All patient's questions were answered to her satisfaction.  This note is created using dictation transcription.  It may contain typographical errors, grammatical errors, improperly dictated words, background noise and other errors.

## 2024-02-06 ENCOUNTER — TELEPHONE (OUTPATIENT)
Age: 84
End: 2024-02-06

## 2024-02-06 NOTE — TELEPHONE ENCOUNTER
Caller: Michaela    Doctor: Ana Laura    Reason for call: Returning office phone call    Call back#: [phone]

## 2024-02-07 ENCOUNTER — VBI (OUTPATIENT)
Dept: ADMINISTRATIVE | Facility: OTHER | Age: 84
End: 2024-02-07

## 2024-02-07 NOTE — TELEPHONE ENCOUNTER
02/07/24 11:57 AM    Patient contacted (left message) to bring Advance Directive, POLST, or Living Will document to next scheduled pcp visit.    Thank you.  Nasrin Pineda [facility]

## 2024-02-08 ENCOUNTER — PROCEDURE VISIT (OUTPATIENT)
Dept: OBGYN CLINIC | Facility: CLINIC | Age: 84
End: 2024-02-08
Payer: MEDICARE

## 2024-02-08 VITALS
WEIGHT: 112 LBS | BODY MASS INDEX: 19.12 KG/M2 | DIASTOLIC BLOOD PRESSURE: 80 MMHG | HEIGHT: 64 IN | SYSTOLIC BLOOD PRESSURE: 138 MMHG

## 2024-02-08 DIAGNOSIS — M17.11 PRIMARY OSTEOARTHRITIS OF RIGHT KNEE: Primary | ICD-10-CM

## 2024-02-08 PROCEDURE — 20610 DRAIN/INJ JOINT/BURSA W/O US: CPT | Performed by: ORTHOPAEDIC SURGERY

## 2024-02-08 NOTE — PROGRESS NOTES
Assessment:     1. Primary osteoarthritis of right knee          Plan:     Problem List Items Addressed This Visit          Musculoskeletal and Integument    Primary osteoarthritis of right knee - Primary     Findings consistent with advanced right knee osteoarthritis, bone-on-bone medially.  Findings and treatment options were discussed with the patient.  The first of 3 right knee Orthovisc injections was given today.  She tolerated the procedure well.  Advised to apply cold compress today.  Follow-up in 1 week for the second injection.  All patient's questions were answered to her satisfaction.  This note is created using dictation transcription.  It may contain typographical errors, grammatical errors, improperly dictated words, background noise and other errors.         Relevant Medications    sodium hyaluronate (ORTHOVISC) injection SOSY 30 mg (Completed)    Other Relevant Orders    Large joint arthrocentesis: R knee (Completed)         Subjective:     Patient ID: Michaela Cruz is a 83 y.o. female.  Chief Complaint:  83 y.o. female presents to the office for follow up of right knee osteoarthritis.  She is here for the first of 3 right knee Orthovisc injections.  She continues to feel aching pain in her knee.    Allergy:  No Known Allergies  Medications:  all current active meds have been reviewed  Past Medical History:  Past Medical History:   Diagnosis Date    Acute otitis externa of left ear 4/12/2022    Cardiomyopathy ([facility])     Chronic diarrhea of unknown origin     resolved 12/22/2015    Contact dermatitis     last assessed 04/19/2013    Esophageal reflux     resolved 07/01/2016    Helicobacter pylori gastrointestinal tract infection 10/25/2018    Hyperlipidemia     resolved 12/22/2015    Muscle weakness (generalized)     resolved 12/22/2015    Pacemaker     Post-viral cough syndrome 12/12/2022    Reactive airway disease     resolved 12/22/2015     Past Surgical History:  Past Surgical History:   Procedure  "Laterality Date    LEG SURGERY Left     OTHER SURGICAL HISTORY      open treatment of weight bearing distal tibial fracture     Family History:  Family History   Problem Relation Age of Onset    Heart disease Father     Diabetes Daughter     Mental illness Daughter         disorder     Social History:  Social History     Substance and Sexual Activity   Alcohol Use No    Comment: denies alcohol use causing problems     Social History     Substance and Sexual Activity   Drug Use No     Social History     Tobacco Use   Smoking Status Never   Smokeless Tobacco Never     Review of Systems   Constitutional:  Negative for chills and fever.   HENT:  Negative for drooling and sneezing.    Eyes:  Negative for redness.   Respiratory:  Negative for cough and wheezing.    Gastrointestinal:  Negative for nausea and vomiting.   Endocrine: Negative.    Genitourinary: Negative.    Musculoskeletal:  Positive for arthralgias (right knee) and gait problem (uses a cane). Negative for joint swelling (Right knee).   Psychiatric/Behavioral:  Negative for behavioral problems. The patient is not nervous/anxious.          Objective:  BP Readings from Last 1 Encounters:   02/08/24 138/80      Wt Readings from Last 1 Encounters:   02/08/24 50.8 kg (112 lb)      BMI:   Estimated body mass index is 19.22 kg/m² as calculated from the following:    Height as of this encounter: 5' 4\" (1.626 m).    Weight as of this encounter: 50.8 kg (112 lb).  BSA:   Estimated body surface area is 1.53 meters squared as calculated from the following:    Height as of this encounter: 5' 4\" (1.626 m).    Weight as of this encounter: 50.8 kg (112 lb).   Physical Exam  Vitals and nursing note reviewed.   Constitutional:       Appearance: Normal appearance. She is well-developed.   HENT:      Head: Normocephalic and atraumatic.      Right Ear: External ear normal.      Left Ear: External ear normal.   Eyes:      Extraocular Movements: Extraocular movements intact.      " Conjunctiva/sclera: Conjunctivae normal.   Neck:      Trachea: No tracheal deviation.   Pulmonary:      Effort: Pulmonary effort is normal.   Musculoskeletal:      Cervical back: Neck supple.      Right knee: No effusion.      Instability Tests: Medial Fanny test negative and lateral Fanny test negative.   Skin:     General: Skin is warm and dry.   Neurological:      Mental Status: She is alert and oriented to person, place, and time.      Deep Tendon Reflexes: Reflexes are normal and symmetric.   Psychiatric:         Mood and Affect: Mood normal.         Behavior: Behavior normal.       Right Knee Exam     Tenderness   The patient is experiencing tenderness in the medial joint line and lateral joint line.    Range of Motion   Extension:  normal   Flexion:  110     Tests   Fanny:  Medial - negative Lateral - negative  Varus: negative Valgus: negative  Patellar apprehension: negative    Other   Erythema: absent  Sensation: normal  Pulse: present  Swelling: none  Effusion: no effusion present    Comments:  Varus alignment  Patellofemoral joint crepitation with knee motion            No new images to review today       Large joint arthrocentesis: R knee  Universal Protocol:  Consent: Verbal consent obtained.  Risks and benefits: risks, benefits and alternatives were discussed  Consent given by: patient  Patient understanding: patient states understanding of the procedure being performed  Supporting Documentation  Indications: pain   Procedure Details  Location: knee - R knee  Preparation: Patient was prepped and draped in the usual sterile fashion  Needle size: 22 G  Approach: anterolateral  Medications administered: 30 mg sodium hyaluronate 30 mg/2 mL    Patient tolerance: patient tolerated the procedure well with no immediate complications  Dressing:  Sterile dressing applied

## 2024-02-08 NOTE — ASSESSMENT & PLAN NOTE
Findings consistent with advanced right knee osteoarthritis, bone-on-bone medially.  Findings and treatment options were discussed with the patient.  The first of 3 right knee Orthovisc injections was given today.  She tolerated the procedure well.  Advised to apply cold compress today.  Follow-up in 1 week for the second injection.  All patient's questions were answered to her satisfaction.  This note is created using dictation transcription.  It may contain typographical errors, grammatical errors, improperly dictated words, background noise and other errors.

## 2024-02-12 ENCOUNTER — RA CDI HCC (OUTPATIENT)
Dept: OTHER | Facility: HOSPITAL | Age: 84
End: 2024-02-12

## 2024-02-15 ENCOUNTER — PROCEDURE VISIT (OUTPATIENT)
Dept: OBGYN CLINIC | Facility: CLINIC | Age: 84
End: 2024-02-15
Payer: MEDICARE

## 2024-02-15 VITALS — DIASTOLIC BLOOD PRESSURE: 74 MMHG | WEIGHT: 112 LBS | SYSTOLIC BLOOD PRESSURE: 130 MMHG | BODY MASS INDEX: 19.22 KG/M2

## 2024-02-15 DIAGNOSIS — M17.11 PRIMARY OSTEOARTHRITIS OF RIGHT KNEE: Primary | ICD-10-CM

## 2024-02-15 PROCEDURE — 20610 DRAIN/INJ JOINT/BURSA W/O US: CPT | Performed by: PHYSICIAN ASSISTANT

## 2024-02-15 RX ORDER — BRIMONIDINE TARTRATE 2 MG/ML
SOLUTION/ DROPS OPHTHALMIC
COMMUNITY
Start: 2023-12-22

## 2024-02-15 RX ORDER — AMLODIPINE BESYLATE 2.5 MG/1
2.5 TABLET ORAL DAILY
COMMUNITY
Start: 2024-01-04 | End: 2025-01-03

## 2024-02-15 NOTE — ASSESSMENT & PLAN NOTE
Findings consistent with advanced right knee osteoarthritis, bone-on-bone medially.  Findings and treatment options were discussed with the patient.  The 2nd of 3 right knee Orthovisc injections was given today.  She tolerated the procedure well.  Advised to apply cold compress today.  Follow-up in 1 week for the third injection.  All patient's questions were answered to her satisfaction.  This note is created using dictation transcription.  It may contain typographical errors, grammatical errors, improperly dictated words, background noise and other errors.

## 2024-02-15 NOTE — PROGRESS NOTES
Assessment:     1. Primary osteoarthritis of right knee            Plan:     Problem List Items Addressed This Visit          Musculoskeletal and Integument    Primary osteoarthritis of right knee - Primary     Findings consistent with advanced right knee osteoarthritis, bone-on-bone medially.  Findings and treatment options were discussed with the patient.  The 2nd of 3 right knee Orthovisc injections was given today.  She tolerated the procedure well.  Advised to apply cold compress today.  Follow-up in 1 week for the third injection.  All patient's questions were answered to her satisfaction.  This note is created using dictation transcription.  It may contain typographical errors, grammatical errors, improperly dictated words, background noise and other errors.         Relevant Medications    sodium hyaluronate (ORTHOVISC) injection SOSY 30 mg (Completed)    Other Relevant Orders    Large joint arthrocentesis: R knee (Completed)           Subjective:     Patient ID: Michaela Cruz is a 83 y.o. female.  Chief Complaint:  83 y.o. female presents to the office for follow up of right knee osteoarthritis.  She is here for the 2nd of 3 right knee Orthovisc injections.  She did have some soreness after the first injection that has since resolved.    Allergy:  No Known Allergies  Medications:  all current active meds have been reviewed  Past Medical History:  Past Medical History:   Diagnosis Date    Acute otitis externa of left ear 4/12/2022    Cardiomyopathy (HCC)     Chronic diarrhea of unknown origin     resolved 12/22/2015    Contact dermatitis     last assessed 04/19/2013    Esophageal reflux     resolved 07/01/2016    Helicobacter pylori gastrointestinal tract infection 10/25/2018    Hyperlipidemia     resolved 12/22/2015    Muscle weakness (generalized)     resolved 12/22/2015    Pacemaker     Post-viral cough syndrome 12/12/2022    Reactive airway disease     resolved 12/22/2015     Past Surgical History:  Past  "Surgical History:   Procedure Laterality Date    LEG SURGERY Left     OTHER SURGICAL HISTORY      open treatment of weight bearing distal tibial fracture     Family History:  Family History   Problem Relation Age of Onset    Heart disease Father     Diabetes Daughter     Mental illness Daughter         disorder     Social History:  Social History     Substance and Sexual Activity   Alcohol Use No    Comment: denies alcohol use causing problems     Social History     Substance and Sexual Activity   Drug Use No     Social History     Tobacco Use   Smoking Status Never   Smokeless Tobacco Never     Review of Systems   Constitutional:  Negative for chills and fever.   HENT:  Negative for drooling and sneezing.    Eyes:  Negative for redness.   Respiratory:  Negative for cough and wheezing.    Gastrointestinal:  Negative for nausea and vomiting.   Endocrine: Negative.    Genitourinary: Negative.    Musculoskeletal:  Positive for arthralgias (right knee) and gait problem (uses a cane). Negative for joint swelling (Right knee).   Psychiatric/Behavioral:  Negative for behavioral problems. The patient is not nervous/anxious.          Objective:  BP Readings from Last 1 Encounters:   02/15/24 130/74      Wt Readings from Last 1 Encounters:   02/15/24 50.8 kg (112 lb)      BMI:   Estimated body mass index is 19.22 kg/m² as calculated from the following:    Height as of 2/8/24: 5' 4\" (1.626 m).    Weight as of this encounter: 50.8 kg (112 lb).  BSA:   Estimated body surface area is 1.53 meters squared as calculated from the following:    Height as of 2/8/24: 5' 4\" (1.626 m).    Weight as of this encounter: 50.8 kg (112 lb).   Physical Exam  Vitals and nursing note reviewed.   Constitutional:       Appearance: Normal appearance. She is well-developed.   HENT:      Head: Normocephalic and atraumatic.      Right Ear: External ear normal.      Left Ear: External ear normal.   Eyes:      Extraocular Movements: Extraocular movements " intact.      Conjunctiva/sclera: Conjunctivae normal.   Neck:      Trachea: No tracheal deviation.   Pulmonary:      Effort: Pulmonary effort is normal.   Musculoskeletal:      Cervical back: Neck supple.      Right knee: No effusion.      Instability Tests: Medial Fanny test negative and lateral Fanny test negative.   Skin:     General: Skin is warm and dry.   Neurological:      Mental Status: She is alert and oriented to person, place, and time.      Deep Tendon Reflexes: Reflexes are normal and symmetric.   Psychiatric:         Mood and Affect: Mood normal.         Behavior: Behavior normal.       Right Knee Exam     Tenderness   The patient is experiencing tenderness in the medial joint line and lateral joint line.    Range of Motion   Extension:  normal   Flexion:  110     Tests   Fanny:  Medial - negative Lateral - negative  Varus: negative Valgus: negative  Patellar apprehension: negative    Other   Erythema: absent  Sensation: normal  Pulse: present  Swelling: none  Effusion: no effusion present    Comments:  Varus alignment  Patellofemoral joint crepitation with knee motion            No new images to review today       Large joint arthrocentesis: R knee  Universal Protocol:  Consent: Verbal consent obtained.  Risks and benefits: risks, benefits and alternatives were discussed  Consent given by: patient  Patient understanding: patient states understanding of the procedure being performed  Supporting Documentation  Indications: pain   Procedure Details  Location: knee - R knee  Preparation: Patient was prepped and draped in the usual sterile fashion  Needle size: 22 G  Approach: anterolateral  Medications administered: 30 mg sodium hyaluronate 30 mg/2 mL    Patient tolerance: patient tolerated the procedure well with no immediate complications  Dressing:  Sterile dressing applied

## 2024-02-22 ENCOUNTER — OFFICE VISIT (OUTPATIENT)
Dept: OBGYN CLINIC | Facility: CLINIC | Age: 84
End: 2024-02-22
Payer: MEDICARE

## 2024-02-22 VITALS
BODY MASS INDEX: 19.12 KG/M2 | DIASTOLIC BLOOD PRESSURE: 78 MMHG | SYSTOLIC BLOOD PRESSURE: 126 MMHG | WEIGHT: 112 LBS | HEIGHT: 64 IN

## 2024-02-22 DIAGNOSIS — M17.11 PRIMARY OSTEOARTHRITIS OF RIGHT KNEE: Primary | ICD-10-CM

## 2024-02-22 PROCEDURE — 20610 DRAIN/INJ JOINT/BURSA W/O US: CPT | Performed by: PHYSICIAN ASSISTANT

## 2024-02-22 NOTE — PROGRESS NOTES
Assessment:     1. Primary osteoarthritis of right knee            Plan:     Problem List Items Addressed This Visit          Musculoskeletal and Integument    Primary osteoarthritis of right knee - Primary     Findings consistent with advanced right knee osteoarthritis, bone-on-bone medially.  Findings and treatment options were discussed with the patient.  The 3rd of 3 right knee Orthovisc injections was given today.  She tolerated the procedure well.  Advised to apply cold compress today.  Follow-up as needed if symptoms return.  Advised patient the soonest she can have another series is in 6 months.  All patient's questions were answered to her satisfaction.  This note is created using dictation transcription.  It may contain typographical errors, grammatical errors, improperly dictated words, background noise and other errors.         Relevant Medications    sodium hyaluronate (ORTHOVISC) injection SOSY 30 mg (Completed)    Other Relevant Orders    Large joint arthrocentesis: R knee (Completed)           Subjective:     Patient ID: Michaela Cruz is a 83 y.o. female.  Chief Complaint:  83 y.o. female presents to the office for follow up of right knee osteoarthritis.  She is here for the 3rd of 3 right knee Orthovisc injections.  She continued to have aching pain for about a week after the second injection.  Pain improved this morning.    Allergy:  No Known Allergies  Medications:  all current active meds have been reviewed  Past Medical History:  Past Medical History:   Diagnosis Date    Acute otitis externa of left ear 4/12/2022    Cardiomyopathy (HCC)     Chronic diarrhea of unknown origin     resolved 12/22/2015    Contact dermatitis     last assessed 04/19/2013    Esophageal reflux     resolved 07/01/2016    Helicobacter pylori gastrointestinal tract infection 10/25/2018    Hyperlipidemia     resolved 12/22/2015    Muscle weakness (generalized)     resolved 12/22/2015    Pacemaker     Post-viral cough  "syndrome 12/12/2022    Reactive airway disease     resolved 12/22/2015     Past Surgical History:  Past Surgical History:   Procedure Laterality Date    LEG SURGERY Left     OTHER SURGICAL HISTORY      open treatment of weight bearing distal tibial fracture     Family History:  Family History   Problem Relation Age of Onset    Heart disease Father     Diabetes Daughter     Mental illness Daughter         disorder     Social History:  Social History     Substance and Sexual Activity   Alcohol Use No    Comment: denies alcohol use causing problems     Social History     Substance and Sexual Activity   Drug Use No     Social History     Tobacco Use   Smoking Status Never   Smokeless Tobacco Never     Review of Systems   Constitutional:  Negative for chills and fever.   HENT:  Negative for drooling and sneezing.    Eyes:  Negative for redness.   Respiratory:  Negative for cough and wheezing.    Gastrointestinal:  Negative for nausea and vomiting.   Endocrine: Negative.    Genitourinary: Negative.    Musculoskeletal:  Positive for arthralgias (right knee) and gait problem (uses a cane). Negative for joint swelling (Right knee).   Psychiatric/Behavioral:  Negative for behavioral problems. The patient is not nervous/anxious.          Objective:  BP Readings from Last 1 Encounters:   02/22/24 126/78      Wt Readings from Last 1 Encounters:   02/22/24 50.8 kg (112 lb)      BMI:   Estimated body mass index is 19.22 kg/m² as calculated from the following:    Height as of this encounter: 5' 4\" (1.626 m).    Weight as of this encounter: 50.8 kg (112 lb).  BSA:   Estimated body surface area is 1.53 meters squared as calculated from the following:    Height as of this encounter: 5' 4\" (1.626 m).    Weight as of this encounter: 50.8 kg (112 lb).   Physical Exam  Vitals and nursing note reviewed.   Constitutional:       Appearance: Normal appearance. She is well-developed.   HENT:      Head: Normocephalic and atraumatic.      Right " Ear: External ear normal.      Left Ear: External ear normal.   Eyes:      Extraocular Movements: Extraocular movements intact.      Conjunctiva/sclera: Conjunctivae normal.   Neck:      Trachea: No tracheal deviation.   Pulmonary:      Effort: Pulmonary effort is normal.   Musculoskeletal:      Cervical back: Neck supple.      Right knee: No effusion.      Instability Tests: Medial Fanny test negative and lateral Fanny test negative.   Skin:     General: Skin is warm and dry.   Neurological:      Mental Status: She is alert and oriented to person, place, and time.      Deep Tendon Reflexes: Reflexes are normal and symmetric.   Psychiatric:         Mood and Affect: Mood normal.         Behavior: Behavior normal.       Right Knee Exam     Tenderness   The patient is experiencing tenderness in the medial joint line and lateral joint line.    Range of Motion   Extension:  normal   Flexion:  110     Tests   Fanny:  Medial - negative Lateral - negative  Varus: negative Valgus: negative  Patellar apprehension: negative    Other   Erythema: absent  Sensation: normal  Pulse: present  Swelling: none  Effusion: no effusion present    Comments:  Varus alignment  Patellofemoral joint crepitation with knee motion            No new images to review today       Large joint arthrocentesis: R knee  Universal Protocol:  Consent: Verbal consent obtained.  Risks and benefits: risks, benefits and alternatives were discussed  Consent given by: patient  Patient understanding: patient states understanding of the procedure being performed  Supporting Documentation  Indications: pain   Procedure Details  Location: knee - R knee  Preparation: Patient was prepped and draped in the usual sterile fashion  Needle size: 22 G  Approach: anterolateral  Medications administered: 30 mg sodium hyaluronate 30 mg/2 mL    Patient tolerance: patient tolerated the procedure well with no immediate complications  Dressing:  Sterile dressing  applied

## 2024-02-22 NOTE — ASSESSMENT & PLAN NOTE
Findings consistent with advanced right knee osteoarthritis, bone-on-bone medially.  Findings and treatment options were discussed with the patient.  The 3rd of 3 right knee Orthovisc injections was given today.  She tolerated the procedure well.  Advised to apply cold compress today.  Follow-up as needed if symptoms return.  Advised patient the soonest she can have another series is in 6 months.  All patient's questions were answered to her satisfaction.  This note is created using dictation transcription.  It may contain typographical errors, grammatical errors, improperly dictated words, background noise and other errors.

## 2024-02-27 ENCOUNTER — APPOINTMENT (OUTPATIENT)
Dept: LAB | Facility: CLINIC | Age: 84
End: 2024-02-27
Payer: MEDICARE

## 2024-02-27 DIAGNOSIS — I50.22 CHRONIC SYSTOLIC CONGESTIVE HEART FAILURE (HCC): ICD-10-CM

## 2024-02-27 DIAGNOSIS — E55.9 VITAMIN D DEFICIENCY: ICD-10-CM

## 2024-02-27 DIAGNOSIS — I10 PRIMARY HYPERTENSION: ICD-10-CM

## 2024-02-27 DIAGNOSIS — N25.81 SECONDARY HYPERPARATHYROIDISM OF RENAL ORIGIN (HCC): ICD-10-CM

## 2024-02-27 DIAGNOSIS — N18.31 STAGE 3A CHRONIC KIDNEY DISEASE (HCC): ICD-10-CM

## 2024-02-27 LAB
25(OH)D3 SERPL-MCNC: 28.5 NG/ML (ref 30–100)
ALBUMIN SERPL BCP-MCNC: 4.3 G/DL (ref 3.5–5)
ANION GAP SERPL CALCULATED.3IONS-SCNC: 7 MMOL/L
BUN SERPL-MCNC: 18 MG/DL (ref 5–25)
CALCIUM SERPL-MCNC: 10.2 MG/DL (ref 8.4–10.2)
CHLORIDE SERPL-SCNC: 101 MMOL/L (ref 96–108)
CO2 SERPL-SCNC: 31 MMOL/L (ref 21–32)
CREAT SERPL-MCNC: 0.81 MG/DL (ref 0.6–1.3)
CREAT UR-MCNC: 24.9 MG/DL
GFR SERPL CREATININE-BSD FRML MDRD: 67 ML/MIN/1.73SQ M
GLUCOSE P FAST SERPL-MCNC: 83 MG/DL (ref 65–99)
MICROALBUMIN UR-MCNC: 10.8 MG/L
MICROALBUMIN/CREAT 24H UR: 43 MG/G CREATININE (ref 0–30)
PHOSPHATE SERPL-MCNC: 3.7 MG/DL (ref 2.3–4.1)
POTASSIUM SERPL-SCNC: 4.2 MMOL/L (ref 3.5–5.3)
PTH-INTACT SERPL-MCNC: 36.3 PG/ML (ref 12–88)
SODIUM SERPL-SCNC: 139 MMOL/L (ref 135–147)

## 2024-02-27 PROCEDURE — 82570 ASSAY OF URINE CREATININE: CPT

## 2024-02-27 PROCEDURE — 82306 VITAMIN D 25 HYDROXY: CPT

## 2024-02-27 PROCEDURE — 80069 RENAL FUNCTION PANEL: CPT

## 2024-02-27 PROCEDURE — 83970 ASSAY OF PARATHORMONE: CPT

## 2024-02-27 PROCEDURE — 82043 UR ALBUMIN QUANTITATIVE: CPT

## 2024-02-27 PROCEDURE — 36415 COLL VENOUS BLD VENIPUNCTURE: CPT

## 2024-03-05 ENCOUNTER — OFFICE VISIT (OUTPATIENT)
Dept: NEPHROLOGY | Facility: CLINIC | Age: 84
End: 2024-03-05
Payer: MEDICARE

## 2024-03-05 VITALS
DIASTOLIC BLOOD PRESSURE: 74 MMHG | SYSTOLIC BLOOD PRESSURE: 124 MMHG | WEIGHT: 111.5 LBS | BODY MASS INDEX: 19.04 KG/M2 | HEIGHT: 64 IN | HEART RATE: 60 BPM

## 2024-03-05 DIAGNOSIS — I42.9 CARDIOMYOPATHY, UNSPECIFIED TYPE (HCC): ICD-10-CM

## 2024-03-05 DIAGNOSIS — E55.9 VITAMIN D DEFICIENCY: ICD-10-CM

## 2024-03-05 DIAGNOSIS — Z95.810 AICD (AUTOMATIC CARDIOVERTER/DEFIBRILLATOR) PRESENT: ICD-10-CM

## 2024-03-05 DIAGNOSIS — I50.22 CHRONIC SYSTOLIC CONGESTIVE HEART FAILURE (HCC): ICD-10-CM

## 2024-03-05 DIAGNOSIS — I10 PRIMARY HYPERTENSION: ICD-10-CM

## 2024-03-05 DIAGNOSIS — N18.31 STAGE 3A CHRONIC KIDNEY DISEASE (HCC): Primary | ICD-10-CM

## 2024-03-05 DIAGNOSIS — N25.81 SECONDARY HYPERPARATHYROIDISM OF RENAL ORIGIN (HCC): ICD-10-CM

## 2024-03-05 PROCEDURE — 99214 OFFICE O/P EST MOD 30 MIN: CPT | Performed by: INTERNAL MEDICINE

## 2024-03-05 RX ORDER — MELATONIN
2000 DAILY
Qty: 180 TABLET | Refills: 3 | Status: SHIPPED | OUTPATIENT
Start: 2024-03-05

## 2024-03-05 RX ORDER — CALCITRIOL 0.25 UG/1
0.25 CAPSULE, LIQUID FILLED ORAL 2 TIMES WEEKLY
Qty: 48 CAPSULE | Refills: 4 | Status: SHIPPED | OUTPATIENT
Start: 2024-03-07

## 2024-03-05 NOTE — PATIENT INSTRUCTIONS
"- hold tums  - start vit  units a day  - decrease calcitriol to 0.25mcg twice a week (Tuesday and Thursday)  - look for pepcid (famotidine) or zantac over the counter for acid reflux    - Please call me in 10 days after having your blood work done to review the results if you do not hear back from me or my office, as I may have not received the results.  - please remember to perform blood work prior to the next visit.  - Please call if the blood pressure top number is greater than 140 or less than 110 consistently.  - Please call if you are gaining more than 2lbs in 2 days for adjustment of water pills.  ~ Please AVOID the following pain medications.  LIST OF NSAIDS (NONSTEROIDAL ANTI-INFLAMMATORY DRUGS) AND LORENZ-2 INHIBITORS    DIFLUNISAL (DOLOBID)  IBUPROFEN (MOTRIN, ADVIL)  FLURBIPROFEN (ANSAID)  KETOPROFEN (ORUDIS, ORUVAIL)  FENOPROFEN (NALFON)  NABUMETONE (RELAFEN)  PIROXICAM (FELDENE)  NAPROXEN (ALEVE, NAPROSYN, NAPRELAN, ANAPROX)  DICLOFENAC (VOLTAREN, CATAFLAM)  INDOMETHACIN (INDOCIN)  SULINDAC (CLINORIL)  TOLMETIN (TOLETIN)  ETODOLAC (LODINE)  MELOXICAM (MOBIC)  KETOROLAC (TORADOL)  OXAPROZIN (DAYPRO)  CELECOXIB (CELEBREX)    Phosphorus diet  Follow a low phosphorus diet.    Avoid these higher phosphorus foods: Choose these lower phosphorus foods:   Milk, pudding or yogurt (from animals and from many soy varieties) Rice milk (unfortified), nondairy creamer (if it doesn't have terms in the ingredients list that contain the letters \"phos\")   Hard cheeses, ricotta or cottage cheese, fat-free cream cheese Regular and low-fat cream cheese   Ice cream or frozen yogurt Sherbet or frozen fruit pops   Soups made with higher phosphorus ingredients (milk, dried peas, beans, lentils) Soups made with lower phosphorus ingredients (broth- or water-based with other lower phosphorus ingredients)   Whole grains, including whole-grain breads, crackers, cereal, rice and pasta Refined grains, including white bread, " "crackers, cereals, rice and pasta   Quick breads, biscuits, cornbread, muffins, pancakes or waffles Homemade refined (white) dinner rolls, bagels or English muffins   Dried peas (split, black-eyed), beans (black, garbanzo, lima, kidney, navy, noel) or lentils Green peas (canned, frozen), green beans or wax beans   Organ meats, walleye, pollock or sardines Lean beef, pork, lamb, poultry or other fish   Nuts and seeds Popcorn   Peanut butter and other nut butters Jam, jelly or honey   Chocolate, including chocolate drinks Carob (chocolate-flavored) candy, hard candy or gumdrops   Loraine and pepper-type sodas, flavored gillette, bottled teas (if a term in the ingredients list contains the letters \"phos\") Lemon-lime soda, ginger ale or root beer, plain water   Follow a moderate potassium diet.          Things to do to reduce your blood pressure include working with all your physician to do the following:  ~ stop smoking if you smoke.  ~ increase cardiovascular exercise like walking and swimming.   ~ modify your diet to decrease fat and salt intake.  ~ reduce your weight if you are overweight or obese.  ~ increase the consumption of fruits, vegetables and whole grains.  ~ decrease alcohol consumption if you consume alcohol.   ~ try to minimize stress in your life with lifestyle modifications.   ~ be compliant with your anti-hypertensive medications.   ~ adjust your medications to help improve your vascular stiffness and decrease risks for heart attacks and strokes.   "

## 2024-03-05 NOTE — PROGRESS NOTES
Nephrology Follow up Consultation  Michaela Cruz 83 y.o. female MRN: [id number]            BACKGROUND:  Michaela Cruz is a 83 y.o.female who was referred by Angela Phelps DO for evaluation of Chronic Kidney Disease and Follow-up  .      ASSESSMENT / PLAN:   83 y.o.  female with pmh of multiple co-morbidities including CHF, hypertension (x 8yrs), GERD, hyperlipidemia, arthritis, nonischemic cardiomyopathy with BiV ICD in place presents to the office for routine follow-up.     CKD stage 3A/B:  - After review of records in [facility] as well as Care everywhere patient has a baseline creatinine of 0.8-1.2 mg/dL dating as far back as 2016. Most recent labs show a Creatinine of 0.81 mg/dL on 2/27/24. Renal function remains stable.   -Likely has underlying CKD secondary to age-related nephron loss plus cardiorenal syndrome plus hypertensive nephrosclerosis plus decreased nephron mass due to renal cysts.   -Patient with significantly elevated free light chain ratio SPEP normal UPEP normal was referred over to hematology.   -Renal ultrasound from 9/29/2022 bilateral kidneys approximately 9 and 10 cm bilateral renal cysts.  Simple cysts.  - Acid base and lytes stable.  - Clinically the patient appears to be euvolemic.  Will not change diuretics at this time  - Recommend to avoid use of NSAIDs, nephrotoxins. Caution advised with regards to exposure to IV contrast dye.   - Discussed with the patient in depth her renal status, including the possible etiologies for CKD.   - Advised the patient that when her GFR is close to 20mL/min then will start discussing about RRT(renal replacement therapy) options such as renal transplant, peritoneal dialysis and hemodialysis.   - Informed the patient about the various options for Renal Replacement therapy.  - Discussed with the patient how we need to work together to delay the progression of CKD with optimal BP control based on their age and co-morbidities and trying to reduce proteinuria by  the use of anti-proteinuric agents.   -Wishes to hold off on CKD education/kidney smart at this time did discuss with the patient    Hypertension:  - Patient is on losartan 100mg po Q24, Lasix 40 mg p.o. daily, Toprol-XL 25 mg p.o. daily, amlodipine 2.5mg po Q24.   -Blood pressure stable at the visit today and at home no changes to regimen euvolemic  - Goal BP of <  130/80 based on age and comorbidities  - Instructed to follow low sodium (2gm)diet.  - Advised to hold ACEI/ARBs if patient suffers from dehydration due to gastrointestinal losses due to risk of ARUN secondary to failure to autoregulate.    Hemoglobin:  - Goal Hb of 10-12 g/dL  - Most recent labs suggestive of 12.7g/dL.   -No role for IV iron    CKD-MBD(Mineral Bone Disease)/vitamin D deficiency/secondary hyperparathyroidism of renal origin:  - Based on patients CKD stage following is the goal of therapy.  - Maintain calcium phosphorus product of < 55.  - Stage 3 CKD - Goal Ca 8.5-10 mg/dL , goal Phos 2.7-4.6 mg/dL  , goal iPTH 30-70 pg/mL  - Patient is currently not at goal.  - patient start vit D 2000 units po Q24, continue Prolia  -Decrease to Calcitrol 0.25 mcg twice a week  - Patients' most recent vitamin D level is 28.5 and intact PTH of 36.3 as of 2/27/24  -Check intact PTH vitamin D prior to next visit    Proteinuria:  - most recent UA positive for nitrites no blood no protein  -Most recent protein creatinine ratio 130 mg as of 11/22/2022  -Most recent micro and creatinine ratio 43 mg/dL as of 2/27/24  - check protein creatinine ratio  - currently on therapy for proteinuria with losartan    Lipids:  - goal LDL less than 70  - Management as per PCP    CHF/nonischemic cardiomyopathy/BiV ICD in place:  - Management as per primary team  -Last seen by cardiology 1/4/24 notes reviewed.  -Most recent echo from 11/23 with EF of 25-30%.    Abnormal free light chain ratio:  -Was referred over to hematology last seen 4/27/2023 we will continue routine  work-up with them has an upcoming appointment 10/29/2024    Nutrition:  - Encouraged patient to follow a renal diet comprising of moderate potassium, low phosphorus and protein restriction to 0.8gm/kg.  - Will check serum albumin with next blood work.     Followup:  - Patient is to follow-up in 6 months, with lab work to be performed in a few days prior to the next visit.  Advised patient to call me in 10 days to review the results if they do not hear back from me, as I may have not received the results.    Monica Elizalde MD, FASN, 3/5/2024, 11:14 AM             SUBJECTIVE: 83 y.o. female presents to the office for routine follow-up.  Feels well has been having some heartburn issues recently started on Norvasc per cardiology doing well Home blood pressure stable no longer taking Tums as it does not help with the reflux.  Agreeable to trying out send take Pepcid over-the-counter.  Happy to hear renal parameters are stable no medication today changes.  Does have issues with her ongoing allergic rhinitis.  Still wishes to hold off on attending CKD education/kidney smart    Review of Systems   Constitutional:  Negative for chills and fever.   HENT:  Negative for congestion.    Respiratory:  Positive for cough. Negative for wheezing.    Cardiovascular:  Negative for leg swelling.   Gastrointestinal:  Negative for diarrhea and vomiting.   Genitourinary:  Negative for difficulty urinating, dysuria and hematuria.   Musculoskeletal:  Negative for back pain.   Skin:  Negative for wound.   Neurological:  Negative for dizziness and headaches.   Psychiatric/Behavioral:  Negative for agitation and confusion.    All other systems reviewed and are negative.      PAST MEDICAL HISTORY:  Past Medical History:   Diagnosis Date   • Acute otitis externa of left ear 4/12/2022   • Cardiomyopathy (HCC)    • Chronic diarrhea of unknown origin     resolved 12/22/2015   • Contact dermatitis     last assessed 04/19/2013   • Esophageal reflux   "   resolved 07/01/2016   • Helicobacter pylori gastrointestinal tract infection 10/25/2018   • Hyperlipidemia     resolved 12/22/2015   • Muscle weakness (generalized)     resolved 12/22/2015   • Pacemaker    • Post-viral cough syndrome 12/12/2022   • Reactive airway disease     resolved 12/22/2015       PROBLEM LIST    Patient Active Problem List   Diagnosis   • Allergic rhinitis   • Cardiomyopathy (HCC)   • CHF (congestive heart failure) ([facility])   • Cough, persistent   • GERD (gastroesophageal reflux disease)   • Hypertension   • Osteoporosis   • TMJ syndrome   • Positive 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 disease   • AICD (automatic cardioverter/defibrillator) present   • Other specified glaucoma   • Primary osteoarthritis of right knee   • Environmental and seasonal allergies   • Chronic pain of left knee   • Primary osteoarthritis of left knee   • Leg length discrepancy   • Tricuspid regurgitation   • PVC's (premature ventricular contractions)   • Stage 3a chronic kidney disease ([facility])   • Vitamin D deficiency   • Vasomotor rhinitis   • Secondary hyperparathyroidism of renal origin ([facility])       PAST SURGICAL HISTORY:  Past Surgical History:   Procedure Laterality Date   • LEG SURGERY Left    • OTHER SURGICAL HISTORY      open treatment of weight bearing distal tibial fracture       SOCIAL HISTORY :   reports that she has never smoked. She has never used smokeless tobacco. She reports that she does not drink alcohol and does not use drugs.    FAMILY HISTORY:  Family History   Problem Relation Age of Onset   • Heart disease Father    • Diabetes Daughter    • Mental illness Daughter         disorder       ALLERGIES:  No Known Allergies        PHYSICAL EXAM:  Vitals:    03/05/24 1047   BP: 124/74   BP Location: Left arm   Patient Position: Sitting   Cuff Size: Standard   Pulse: 60   Weight: 50.6 kg (111 lb 8 oz)   Height: 5' 4\" (1.626 m)       Body mass index is 19.14 kg/m².    Physical Exam  Vitals reviewed. "   Constitutional:       General: She is not in acute distress.     Appearance: Normal appearance. She is normal weight. She is not ill-appearing, toxic-appearing or diaphoretic.   HENT:      Head: Normocephalic and atraumatic.      Mouth/Throat:      Mouth: Mucous membranes are moist.      Pharynx: No oropharyngeal exudate.   Eyes:      General: No scleral icterus.  Pulmonary:      Effort: No respiratory distress.      Breath sounds: Normal breath sounds. No stridor. No wheezing.   Abdominal:      General: There is no distension.      Palpations: Abdomen is soft. There is no mass.      Tenderness: There is no right CVA tenderness or left CVA tenderness.   Musculoskeletal:         General: No swelling.      Cervical back: Normal range of motion. No rigidity.   Skin:     Coloration: Skin is not jaundiced.   Neurological:      General: No focal deficit present.      Mental Status: She is alert and oriented to person, place, and time.   Psychiatric:         Mood and Affect: Mood normal.         Behavior: Behavior normal.         LABORATORY DATA:     Results from last 6 Months   Lab Units 02/27/24  1137 10/16/23  0956   WBC Thousand/uL  --  7.38   HEMOGLOBIN g/dL  --  12.7   HEMATOCRIT %  --  41.2   PLATELETS Thousands/uL  --  152   POTASSIUM mmol/L 4.2 5.0   CHLORIDE mmol/L 101 103   CO2 mmol/L 31 25   BUN mg/dL 18 24   CREATININE mg/dL 0.81 0.95   CALCIUM mg/dL 10.2 9.9   PHOSPHORUS mg/dL 3.7  --           rest all reviewed    RADIOLOGY:  No orders to display     Rest all reviewed        MEDICATIONS:    Current Outpatient Medications:   •  amLODIPine (NORVASC) 2.5 mg tablet, Take 2.5 mg by mouth daily, Disp: , Rfl:   •  aspirin 81 MG tablet, Take 1 tablet by mouth daily, Disp: , Rfl:   •  brimonidine tartrate 0.2 % ophthalmic solution, , Disp: , Rfl:   •  [START ON 3/7/2024] calcitriol (ROCALTROL) 0.25 mcg capsule, Take 1 capsule (0.25 mcg total) by mouth 2 (two) times a week On Tuesday and thursday, Disp: 48 capsule,  "Rfl: 4  •  cholecalciferol (VITAMIN D3) 1,000 units tablet, Take 2 tablets (2,000 Units total) by mouth daily, Disp: 180 tablet, Rfl: 3  •  denosumab (Prolia) 60 mg/mL, as directed, Disp: , Rfl:   •  furosemide (LASIX) 40 mg tablet, Take 40 mg by mouth daily  , Disp: , Rfl:   •  latanoprost (XALATAN) 0.005 % ophthalmic solution, , Disp: , Rfl:   •  loratadine (CLARITIN) 10 mg tablet, Take 1 tablet (10 mg total) by mouth daily (Patient taking differently: Take 10 mg by mouth if needed), Disp: 30 tablet, Rfl: 3  •  losartan (COZAAR) 100 MG tablet, Take 1 tablet (100 mg total) by mouth every 24 hours, Disp: 90 tablet, Rfl: 2  •  metoprolol succinate (TOPROL-XL) 25 mg 24 hr tablet, Take 1 tablet (25 mg total) by mouth daily, Disp: 90 tablet, Rfl: 2  •  timolol (TIMOPTIC) 0.5 % ophthalmic solution, , Disp: , Rfl:           Portions of the record may have been created with voice recognition software. Occasional wrong word or \"sound a like\" substitutions may have occurred due to the inherent limitations of voice recognition software. Read the chart carefully and recognize, using context, where substitutions have occurred.If you have any questions, please contact the dictating provider.      "

## 2024-03-27 ENCOUNTER — TELEPHONE (OUTPATIENT)
Dept: FAMILY MEDICINE CLINIC | Facility: CLINIC | Age: 84
End: 2024-03-27

## 2024-03-27 ENCOUNTER — TELEPHONE (OUTPATIENT)
Dept: ADMINISTRATIVE | Facility: OTHER | Age: 84
End: 2024-03-27

## 2024-03-27 NOTE — TELEPHONE ENCOUNTER
03/27/24 2:14 PM    Patient contacted (left message) to bring Advance Directive, POLST, or Living Will document to next scheduled pcp visit.    Thank you.  Bushra Florence  [facility]

## 2024-03-27 NOTE — TELEPHONE ENCOUNTER
Patient informed to bring her will to her next appointment on 4/1/24 at 10 AM with Dr. Phelps

## 2024-04-01 ENCOUNTER — OFFICE VISIT (OUTPATIENT)
Dept: FAMILY MEDICINE CLINIC | Facility: CLINIC | Age: 84
End: 2024-04-01
Payer: MEDICARE

## 2024-04-01 VITALS
RESPIRATION RATE: 16 BRPM | OXYGEN SATURATION: 97 % | DIASTOLIC BLOOD PRESSURE: 82 MMHG | HEART RATE: 64 BPM | HEIGHT: 60 IN | TEMPERATURE: 97.8 F | SYSTOLIC BLOOD PRESSURE: 140 MMHG | WEIGHT: 112.4 LBS | BODY MASS INDEX: 22.07 KG/M2

## 2024-04-01 DIAGNOSIS — I10 PRIMARY HYPERTENSION: Primary | ICD-10-CM

## 2024-04-01 DIAGNOSIS — I50.20 HFREF (HEART FAILURE WITH REDUCED EJECTION FRACTION) (HCC): ICD-10-CM

## 2024-04-01 DIAGNOSIS — R13.10 DYSPHAGIA, UNSPECIFIED TYPE: ICD-10-CM

## 2024-04-01 DIAGNOSIS — N18.31 STAGE 3A CHRONIC KIDNEY DISEASE (HCC): ICD-10-CM

## 2024-04-01 DIAGNOSIS — M81.0 AGE-RELATED OSTEOPOROSIS WITHOUT CURRENT PATHOLOGICAL FRACTURE: ICD-10-CM

## 2024-04-01 PROBLEM — Z95.0 PACEMAKER: Status: ACTIVE | Noted: 2023-08-31

## 2024-04-01 PROCEDURE — 99214 OFFICE O/P EST MOD 30 MIN: CPT | Performed by: FAMILY MEDICINE

## 2024-04-01 PROCEDURE — G2211 COMPLEX E/M VISIT ADD ON: HCPCS | Performed by: FAMILY MEDICINE

## 2024-04-01 NOTE — PROGRESS NOTES
Assessment/Plan:  Problem List Items Addressed This Visit        Cardiovascular and Mediastinum    HFrEF (heart failure with reduced ejection fraction) ([facility])     Wt Readings from Last 3 Encounters:   04/01/24 51 kg (112 lb 6.4 oz)   03/05/24 50.6 kg (111 lb 8 oz)   02/22/24 50.8 kg (112 lb)     The patient's weight remains stable.  She will continue to monitor this daily.  She will follow-up with cardiology as scheduled and will continue with her current medications.               Primary hypertension - Primary     The patient's blood pressure is stable on her current medication.  We have made no changes today. She will continue with her diet and will continue to remain active.  We have made no changes today.  Will see her back as scheduled.            Digestive    Dysphagia     The patient has noticed increased difficulty swallowing pills and food.  She has had problems with esophageal stricture in the past and had undergone a dilation years ago.  We will refer her back to GI as indicated for further evaluation and probable EGD.         Relevant Orders    Ambulatory Referral to Gastroenterology       Musculoskeletal and Integument    Osteoporosis     The patient will continue follow-up with rheumatology as scheduled and will continue with her Prolia injections.  We will see her back as scheduled.  She will continue with her calcium rich diet and with her vitamin D supplementation.         Relevant Orders    DXA bone density spine hip and pelvis       Genitourinary    Stage 3a chronic kidney disease (HCC)     Lab Results   Component Value Date    EGFR 67 02/27/2024    EGFR 55 10/16/2023    EGFR 53 07/17/2023    CREATININE 0.81 02/27/2024    CREATININE 0.95 10/16/2023    CREATININE 0.98 07/17/2023   The patient's kidney function remained stable.  She will continue to follow-up with nephrology as scheduled.  She will continue to avoid nephrotoxic agents and will maintain adequate hydration.  She will limit her sodium  intake.  We will see her back as scheduled.            Return in about 20 weeks (around 8/19/2024) for Annual physical-AWV.   I spent 15 minutes during the visit reviewing the history from the patient, performing the examination, discussing the findings with the patient, providing counseling and education, and making a plan. I spent 15 minutes ordering referrals and testing and documenting.    Subjective:   Chief Complaint   Patient presents with   • Follow-up     6 month          Patient ID: Michaela Cruz is a 83 y.o. female presents today for routine checkup.  Michaela Cruz is a 83 y.o. female who presents today for routine follow-up of her hypertension, heart failure, osteoporosis, and chronic kidney disease.  The patient denies any chest pain, shortness of breath, or palpitations.  There is no edema.  There are no headaches or visual changes.  There is no lightheadedness, dizziness, or fainting spells.  The patient currently denies any nausea, vomiting, or GERD symptoms.  she has normal bowel movements and normal urine output.  she has a normal appetite.    She is feeling well.    There is some increased problems swallowing for a year.  There is problems with food getting stuck in her throat.    There is occasional heartburn.   She had esophageal dilation years ago.    There are increased allergies- Claritin on and off.      Hypertension  This is a chronic problem. The current episode started more than 1 year ago. The problem is unchanged. The problem is controlled. Pertinent negatives include no anxiety, blurred vision, chest pain, headaches, malaise/fatigue, neck pain, orthopnea, palpitations, peripheral edema, PND, shortness of breath or sweats.     The following portions of the patient's history were reviewed and updated as appropriate: allergies, current medications, past family history, past medical history, past social history, past surgical history and problem list.  Patient Active Problem List   Diagnosis    • Allergic rhinitis   • NICM (nonischemic cardiomyopathy) ([facility])   • HFrEF (heart failure with reduced ejection fraction) ([facility])   • Cough, persistent   • GERD (gastroesophageal reflux disease)   • Primary hypertension   • Osteoporosis   • TMJ syndrome   • Positive 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 disease   • AICD (automatic cardioverter/defibrillator) present   • Other specified glaucoma   • Primary osteoarthritis of right knee   • Environmental and seasonal allergies   • Chronic pain of left knee   • Primary osteoarthritis of left knee   • Leg length discrepancy   • Tricuspid regurgitation   • VPC's (ventricular premature complexes)   • Stage 3a chronic kidney disease ([facility])   • Vitamin D deficiency   • Vasomotor rhinitis   • Secondary hyperparathyroidism of renal origin ([facility])   • Pacemaker   • Dysphagia     Past Medical History:   Diagnosis Date   • Acute otitis externa of left ear 4/12/2022   • Cardiomyopathy ([facility])    • Chronic diarrhea of unknown origin     resolved 12/22/2015   • Contact dermatitis     last assessed 04/19/2013   • Esophageal reflux     resolved 07/01/2016   • Helicobacter pylori gastrointestinal tract infection 10/25/2018   • Hyperlipidemia     resolved 12/22/2015   • Muscle weakness (generalized)     resolved 12/22/2015   • Pacemaker    • Post-viral cough syndrome 12/12/2022   • Reactive airway disease     resolved 12/22/2015     Past Surgical History:   Procedure Laterality Date   • LEG SURGERY Left    • OTHER SURGICAL HISTORY      open treatment of weight bearing distal tibial fracture     No Known Allergies  Family History   Problem Relation Age of Onset   • Heart disease Father    • Diabetes Daughter    • Mental illness Daughter         disorder     Social History     Socioeconomic History   • Marital status:      Spouse name: Not on file   • Number of children: Not on file   • Years of education: Not on file   • Highest education level: Not on file   Occupational History   •  Occupation: retired   Tobacco Use   • Smoking status: Never     Passive exposure: Past   • Smokeless tobacco: Never   Vaping Use   • Vaping status: Never Used   Substance and Sexual Activity   • Alcohol use: No     Comment: denies alcohol use causing problems   • Drug use: No   • Sexual activity: Not on file   Other Topics Concern   • Not on file   Social History Narrative    .       Social Determinants of Health     Financial Resource Strain: Low Risk  (8/15/2023)    Overall Financial Resource Strain (CARDIA)    • Difficulty of Paying Living Expenses: Not hard at all   Food Insecurity: Unknown (6/16/2020)    Received from [facility]    Hunger Vital Sign    • Worried About Running Out of Food in the Last Year: Patient declined    • Ran Out of Food in the Last Year: Patient declined   Transportation Needs: No Transportation Needs (8/15/2023)    PRAPARE - Transportation    • Lack of Transportation (Medical): No    • Lack of Transportation (Non-Medical): No   Physical Activity: Insufficiently Active (6/13/2022)    Exercise Vital Sign    • Days of Exercise per Week: 2 days    • Minutes of Exercise per Session: 30 min   Stress: No Stress Concern Present (6/13/2022)    [employer] - Occupational Stress Questionnaire    • Feeling of Stress : Only a little   Social Connections: Moderately Integrated (6/16/2020)    Received from [facility]    Social Connection and Isolation Panel [NHANES]    • Frequency of Communication with Friends and Family: More than three times a week    • Frequency of Social Gatherings with Friends and Family: More than three times a week    • Attends [employer] Services: 1 to 4 times per year    • Active Member of Clubs or Organizations: Yes    • Attends Club or Organization Meetings: 1 to 4 times per year    • Marital Status:    Intimate Partner Violence: Not At Risk (4/1/2024)    Humiliation, Afraid,  Rape, and Kick questionnaire    • Fear of Current or Ex-Partner: No    • Emotionally Abused: No    • Physically Abused: No    • Sexually Abused: No   Housing Stability: Not on file     Current Outpatient Medications on File Prior to Visit   Medication Sig Dispense Refill   • amLODIPine (NORVASC) 2.5 mg tablet Take 2.5 mg by mouth daily     • aspirin 81 MG tablet Take 1 tablet by mouth daily     • brimonidine tartrate 0.2 % ophthalmic solution      • calcitriol (ROCALTROL) 0.25 mcg capsule Take 1 capsule (0.25 mcg total) by mouth 2 (two) times a week On Tuesday and thursday 48 capsule 4   • cholecalciferol (VITAMIN D3) 1,000 units tablet Take 2 tablets (2,000 Units total) by mouth daily 180 tablet 3   • denosumab (Prolia) 60 mg/mL as directed     • furosemide (LASIX) 40 mg tablet Take 40 mg by mouth daily       • latanoprost (XALATAN) 0.005 % ophthalmic solution      • loratadine (CLARITIN) 10 mg tablet Take 1 tablet (10 mg total) by mouth daily (Patient taking differently: Take 10 mg by mouth if needed) 30 tablet 3   • losartan (COZAAR) 100 MG tablet Take 1 tablet (100 mg total) by mouth every 24 hours 90 tablet 2   • metoprolol succinate (TOPROL-XL) 25 mg 24 hr tablet Take 1 tablet (25 mg total) by mouth daily 90 tablet 2   • timolol (TIMOPTIC) 0.5 % ophthalmic solution      • [DISCONTINUED] ergocalciferol (ERGOCALCIFEROL) 1.25 MG (65023 UT) capsule Take 1 capsule (50,000 Units total) by mouth once a week (Patient not taking: Reported on 8/4/2023) 12 capsule 0     No current facility-administered medications on file prior to visit.     Review of Systems   Constitutional: Negative.  Negative for malaise/fatigue.   HENT: Negative.     Eyes: Negative.  Negative for blurred vision.   Respiratory: Negative.  Negative for shortness of breath.    Cardiovascular: Negative.  Negative for chest pain, palpitations, orthopnea and PND.   Gastrointestinal: Negative.    Endocrine: Negative.    Genitourinary: Negative.   "  Musculoskeletal: Negative.  Negative for neck pain.   Skin: Negative.    Allergic/Immunologic: Negative.    Neurological: Negative.  Negative for headaches.   Hematological: Negative.    Psychiatric/Behavioral: Negative.         Objective:  Vitals:    04/01/24 0949 04/01/24 1020   BP: 142/88 140/82   BP Location: Left arm    Patient Position: Sitting    Cuff Size: Standard    Pulse: 64    Resp: 16    Temp: 97.8 °F (36.6 °C)    TempSrc: Tympanic    SpO2: 97%    Weight: 51 kg (112 lb 6.4 oz)    Height: 5' 0.4\" (1.534 m)      Body mass index is 21.66 kg/m².     Physical Exam  Constitutional:       Appearance: She is well-developed.   HENT:      Head: Normocephalic and atraumatic.      Right Ear: Tympanic membrane, ear canal and external ear normal.      Left Ear: Tympanic membrane, ear canal and external ear normal.      Nose: Nose normal.      Mouth/Throat:      Mouth: Mucous membranes are moist.      Pharynx: No oropharyngeal exudate.   Eyes:      Extraocular Movements: Extraocular movements intact.      Conjunctiva/sclera: Conjunctivae normal.      Pupils: Pupils are equal, round, and reactive to light.   Neck:      Thyroid: No thyromegaly.      Vascular: No JVD.      Trachea: No tracheal deviation.   Cardiovascular:      Rate and Rhythm: Normal rate and regular rhythm.      Pulses: Normal pulses.      Heart sounds: Normal heart sounds. No murmur heard.     No friction rub. No gallop.   Pulmonary:      Effort: Pulmonary effort is normal. No respiratory distress.      Breath sounds: Normal breath sounds. No stridor. No wheezing or rales.   Chest:      Chest wall: No tenderness.   Abdominal:      General: Bowel sounds are normal. There is no distension.      Palpations: Abdomen is soft. There is no mass.      Tenderness: There is no abdominal tenderness. There is no guarding or rebound.   Musculoskeletal:         General: No swelling, tenderness, deformity or signs of injury. Normal range of motion.      Cervical " back: Normal range of motion.      Right lower leg: No edema.      Left lower leg: No edema.   Lymphadenopathy:      Cervical: No cervical adenopathy.   Skin:     General: Skin is warm and dry.   Neurological:      General: No focal deficit present.      Mental Status: She is alert and oriented to person, place, and time.      Cranial Nerves: No cranial nerve deficit.      Motor: No abnormal muscle tone.      Coordination: Coordination normal.      Deep Tendon Reflexes: Reflexes are normal and symmetric. Reflexes normal.             Depression Screening and Follow-up Plan: Patient was screened for depression during today's encounter. They screened negative with a PHQ-2 score of 0.

## 2024-04-02 PROBLEM — R13.10 DYSPHAGIA: Status: ACTIVE | Noted: 2024-04-02

## 2024-04-02 NOTE — ASSESSMENT & PLAN NOTE
Wt Readings from Last 3 Encounters:   04/01/24 51 kg (112 lb 6.4 oz)   03/05/24 50.6 kg (111 lb 8 oz)   02/22/24 50.8 kg (112 lb)     The patient's weight remains stable.  She will continue to monitor this daily.  She will follow-up with cardiology as scheduled and will continue with her current medications.

## 2024-04-02 NOTE — ASSESSMENT & PLAN NOTE
Lab Results   Component Value Date    EGFR 67 02/27/2024    EGFR 55 10/16/2023    EGFR 53 07/17/2023    CREATININE 0.81 02/27/2024    CREATININE 0.95 10/16/2023    CREATININE 0.98 07/17/2023   The patient's kidney function remained stable.  She will continue to follow-up with nephrology as scheduled.  She will continue to avoid nephrotoxic agents and will maintain adequate hydration.  She will limit her sodium intake.  We will see her back as scheduled.

## 2024-04-02 NOTE — ASSESSMENT & PLAN NOTE
The patient has noticed increased difficulty swallowing pills and food.  She has had problems with esophageal stricture in the past and had undergone a dilation years ago.  We will refer her back to GI as indicated for further evaluation and probable EGD.

## 2024-04-02 NOTE — ASSESSMENT & PLAN NOTE
The patient will continue follow-up with rheumatology as scheduled and will continue with her Prolia injections.  We will see her back as scheduled.  She will continue with her calcium rich diet and with her vitamin D supplementation.

## 2024-04-02 NOTE — ASSESSMENT & PLAN NOTE
The patient's blood pressure is stable on her current medication.  We have made no changes today. She will continue with her diet and will continue to remain active.  We have made no changes today.  Will see her back as scheduled.

## 2024-04-22 DIAGNOSIS — J30.2 SEASONAL ALLERGIC RHINITIS, UNSPECIFIED TRIGGER: ICD-10-CM

## 2024-04-22 RX ORDER — LORATADINE 10 MG/1
10 TABLET ORAL DAILY
Qty: 30 TABLET | Refills: 3 | Status: SHIPPED | OUTPATIENT
Start: 2024-04-22

## 2024-05-08 ENCOUNTER — OFFICE VISIT (OUTPATIENT)
Dept: GASTROENTEROLOGY | Facility: CLINIC | Age: 84
End: 2024-05-08
Payer: MEDICARE

## 2024-05-08 VITALS
WEIGHT: 111 LBS | BODY MASS INDEX: 21.79 KG/M2 | HEIGHT: 60 IN | DIASTOLIC BLOOD PRESSURE: 78 MMHG | SYSTOLIC BLOOD PRESSURE: 118 MMHG

## 2024-05-08 DIAGNOSIS — R13.10 DYSPHAGIA, UNSPECIFIED TYPE: ICD-10-CM

## 2024-05-08 DIAGNOSIS — K21.9 GASTROESOPHAGEAL REFLUX DISEASE, UNSPECIFIED WHETHER ESOPHAGITIS PRESENT: Primary | ICD-10-CM

## 2024-05-08 PROCEDURE — 99203 OFFICE O/P NEW LOW 30 MIN: CPT | Performed by: INTERNAL MEDICINE

## 2024-05-08 NOTE — PROGRESS NOTES
[facility] - Outpatient Consultation  Michaela Cruz 84 y.o. female MRN: [id number]  Encounter: [id number]    ASSESSMENT AND PLAN:    1. Gastroesophageal reflux disease, unspecified whether esophagitis present    2. Dysphagia, unspecified type  -     FL barium swallow video w speech; Future; Expected date: 05/08/2024      Assessment & Plan  1. Dysphagia.  A barium swallow study has been recommended for the patient. The potential risks and benefits of this procedure have been thoroughly discussed with the patient. Should the swallow study reveal esophageal strictures, an endoscopy will be performed. Alternatively, an endoscopy could be performed to dilate the esophagus. The patient has been informed of the associated risks, including bleeding, infection, and perforation. Post-procedure, the patient will be contacted to discuss the results and determine the next steps.    ---    Chief Complaint   Patient presents with    Consult     EGD       HPI:   Michaela Cruz is a 84 y.o. female presenting to [facility] care.   History of Present Illness  The patient presents for evaluation of dysphagia. She is presenting as a consultation from Dr. Phelps regarding dysphagia. She does have a history of esophageal stricture in the past on her endoscopy in 2015 that was very mild. She had a repeat endoscopy in 2018 that showed mild gastritis and a normal esophagus with unremarkable biopsies.    The patient has been experiencing dysphagia for an extended period. She underwent esophageal dilation in 2015 and is currently experiencing the same issue. She reports occasional difficulty in mastication and swallowing, necessitating multiple attempts to swallow food. This issue has recently intensified. Occasionally, she experiences choking, necessitating regurgitation. She denies any associated pain. Her bowel movements are regular, with no reported hematochezia. Her weight has remained stable, attributing  this to her diuretic regimen. She has not previously undergone a barium swallow study. The patient, who has a pacemaker, queries whether her allergic rhinitis could be contributing to her dysphagia.    Supplemental Information  She has allergic rhinitis. She takes Claritin, but not every day.    Historical Information   Past Medical History:   Diagnosis Date    Acute otitis externa of left ear 04/12/2022    Cardiomyopathy (HCC)     Chronic diarrhea of unknown origin     resolved 12/22/2015    Colon polyp     Contact dermatitis     last assessed 04/19/2013    Esophageal reflux     resolved 07/01/2016    Helicobacter pylori gastrointestinal tract infection 10/25/2018    Hyperlipidemia     resolved 12/22/2015    Muscle weakness (generalized)     resolved 12/22/2015    Pacemaker     Post-viral cough syndrome 12/12/2022    Reactive airway disease     resolved 12/22/2015     Past Surgical History:   Procedure Laterality Date    COLONOSCOPY      LEG SURGERY Left     OTHER SURGICAL HISTORY      open treatment of weight bearing distal tibial fracture    UPPER GASTROINTESTINAL ENDOSCOPY       Social History     Substance and Sexual Activity   Alcohol Use No    Comment: denies alcohol use causing problems     Social History     Substance and Sexual Activity   Drug Use No     Social History     Tobacco Use   Smoking Status Never    Passive exposure: Past   Smokeless Tobacco Never     Family History   Problem Relation Age of Onset    Heart disease Father     Diabetes Daughter     Mental illness Daughter         disorder       Meds/Allergies     Current Outpatient Medications:     amLODIPine (NORVASC) 2.5 mg tablet    aspirin 81 MG tablet    brimonidine tartrate 0.2 % ophthalmic solution    calcitriol (ROCALTROL) 0.25 mcg capsule    cholecalciferol (VITAMIN D3) 1,000 units tablet    denosumab (Prolia) 60 mg/mL    furosemide (LASIX) 40 mg tablet    latanoprost (XALATAN) 0.005 % ophthalmic solution    loratadine (CLARITIN) 10 mg  "tablet    losartan (COZAAR) 100 MG tablet    metoprolol succinate (TOPROL-XL) 25 mg 24 hr tablet    timolol (TIMOPTIC) 0.5 % ophthalmic solution  No Known Allergies    PHYSICAL EXAM:    Blood pressure 118/78, height 5' (1.524 m), weight 50.3 kg (111 lb), not currently breastfeeding. Body mass index is 21.68 kg/m².  Physical Exam      General Appearance: No apparent distress, cooperative, alert.  Eyes: Anicteric.  Gastrointestinal: Soft, non-tender, non-distended; normal bowel sounds; no masses, no organomegaly.    Rectal: Deferred.  Musculoskeletal: No edema.  Skin: No jaundice.     OTHER LAB RESULTS:   Lab Results   Component Value Date    WBC 7.38 10/16/2023    WBC 8.38 04/04/2023    WBC 10.20 (H) 08/18/2022    HGB 12.7 10/16/2023    HGB 11.8 04/04/2023    HGB 12.1 08/18/2022     (H) 10/16/2023     10/16/2023     04/04/2023     08/18/2022     Lab Results   Component Value Date     01/04/2016    K 4.2 02/27/2024     02/27/2024    CO2 31 02/27/2024    ANIONGAP 6 01/04/2016    BUN 18 02/27/2024    CREATININE 0.81 02/27/2024    GLUCOSE 85 01/04/2016    GLUF 83 02/27/2024    CALCIUM 10.2 02/27/2024    CORRECTEDCA 10.0 09/29/2022    AST 28 10/16/2023    AST 20 11/22/2022    AST 22 09/29/2022    ALT 20 10/16/2023    ALT 21 11/22/2022    ALT 19 09/29/2022    ALKPHOS 38 10/16/2023    ALKPHOS 54 11/22/2022    ALKPHOS 64 09/29/2022    PROT 7.3 09/21/2015    BILITOT 0.30 09/21/2015    BILITOT 0.22 07/07/2015    BILITOT 0.54 03/24/2015    EGFR 67 02/27/2024     No results found for: \"IRON\", \"TIBC\", \"FERRITIN\"  Lab Results   Component Value Date    LIPASE 111 07/07/2015       OTHER RADIOLOGY RESULTS:   No results found.  "

## 2024-05-28 ENCOUNTER — OFFICE VISIT (OUTPATIENT)
Dept: OBGYN CLINIC | Facility: CLINIC | Age: 84
End: 2024-05-28
Payer: MEDICARE

## 2024-05-28 VITALS
HEIGHT: 60 IN | BODY MASS INDEX: 21.79 KG/M2 | DIASTOLIC BLOOD PRESSURE: 80 MMHG | SYSTOLIC BLOOD PRESSURE: 120 MMHG | WEIGHT: 111 LBS

## 2024-05-28 DIAGNOSIS — M17.11 PRIMARY OSTEOARTHRITIS OF RIGHT KNEE: Primary | ICD-10-CM

## 2024-05-28 PROCEDURE — 99213 OFFICE O/P EST LOW 20 MIN: CPT | Performed by: ORTHOPAEDIC SURGERY

## 2024-05-28 PROCEDURE — 20610 DRAIN/INJ JOINT/BURSA W/O US: CPT | Performed by: ORTHOPAEDIC SURGERY

## 2024-05-28 RX ORDER — LIDOCAINE HYDROCHLORIDE 10 MG/ML
7 INJECTION, SOLUTION EPIDURAL; INFILTRATION; INTRACAUDAL; PERINEURAL
Status: COMPLETED | OUTPATIENT
Start: 2024-05-28 | End: 2024-05-28

## 2024-05-28 RX ORDER — BETAMETHASONE SODIUM PHOSPHATE AND BETAMETHASONE ACETATE 3; 3 MG/ML; MG/ML
6 INJECTION, SUSPENSION INTRA-ARTICULAR; INTRALESIONAL; INTRAMUSCULAR; SOFT TISSUE
Status: COMPLETED | OUTPATIENT
Start: 2024-05-28 | End: 2024-05-28

## 2024-05-28 RX ADMIN — BETAMETHASONE SODIUM PHOSPHATE AND BETAMETHASONE ACETATE 6 MG: 3; 3 INJECTION, SUSPENSION INTRA-ARTICULAR; INTRALESIONAL; INTRAMUSCULAR; SOFT TISSUE at 10:45

## 2024-05-28 RX ADMIN — LIDOCAINE HYDROCHLORIDE 7 ML: 10 INJECTION, SOLUTION EPIDURAL; INFILTRATION; INTRACAUDAL; PERINEURAL at 10:45

## 2024-05-28 NOTE — ASSESSMENT & PLAN NOTE
Findings consistent with advanced right knee osteoarthritis, bone-on-bone medially. Findings and treatment options were discussed with the patient. After a discussion of risks and benefits the patient elected to proceed with a right knee steroid injection today.  Patient should ice and avoid strenuous activity for 1-2 days if needed. If patient is diabetic should also monitor glucose over the next 7 to 10 days.  All patient's questions were answered to her satisfaction.  This note is created using dictation transcription.  It may contain typographical errors, grammatical errors, improperly dictated words, background noise and other errors.

## 2024-05-28 NOTE — PROGRESS NOTES
Assessment:     1. Primary osteoarthritis of right knee        Plan:     Problem List Items Addressed This Visit          Musculoskeletal and Integument    Primary osteoarthritis of right knee - Primary     Findings consistent with advanced right knee osteoarthritis, bone-on-bone medially. Findings and treatment options were discussed with the patient. After a discussion of risks and benefits the patient elected to proceed with a right knee steroid injection today.  Patient should ice and avoid strenuous activity for 1-2 days if needed. If patient is diabetic should also monitor glucose over the next 7 to 10 days.  All patient's questions were answered to her satisfaction.  This note is created using dictation transcription.  It may contain typographical errors, grammatical errors, improperly dictated words, background noise and other errors.         Relevant Medications    betamethasone acetate-betamethasone sodium phosphate (CELESTONE) injection 6 mg (Completed)    lidocaine (PF) (XYLOCAINE-MPF) 1 % injection 7 mL (Completed)    Other Relevant Orders    Large joint arthrocentesis: R knee (Completed)      Subjective:     Patient ID: Michaela Cruz is a 84 y.o. female.  Chief Complaint:  83 y.o. female presents to the office for follow up of right knee osteoarthritis. Patient completed a visco supplementation series on 2/22/24 with minimal noted benefit. She continues to c/o anteromedial based right knee pain. Pain is worse with prolonged ambulation and standing. She denies any mechanical symptoms about the knee. She is requesting to repeat a cortisone injection today into her right knee as she feels these provided her with more benefit then the visco injections. No numbness or tingling. No fevers or chills.     Allergy:  No Known Allergies  Medications:  all current active meds have been reviewed  Past Medical History:  Past Medical History:   Diagnosis Date    Acute otitis externa of left ear 04/12/2022     Cardiomyopathy (HCC)     Chronic diarrhea of unknown origin     resolved 12/22/2015    Colon polyp     Contact dermatitis     last assessed 04/19/2013    Esophageal reflux     resolved 07/01/2016    Helicobacter pylori gastrointestinal tract infection 10/25/2018    Hyperlipidemia     resolved 12/22/2015    Muscle weakness (generalized)     resolved 12/22/2015    Pacemaker     Post-viral cough syndrome 12/12/2022    Reactive airway disease     resolved 12/22/2015     Past Surgical History:  Past Surgical History:   Procedure Laterality Date    COLONOSCOPY      LEG SURGERY Left     OTHER SURGICAL HISTORY      open treatment of weight bearing distal tibial fracture    UPPER GASTROINTESTINAL ENDOSCOPY       Family History:  Family History   Problem Relation Age of Onset    Heart disease Father     Diabetes Daughter     Mental illness Daughter         disorder     Social History:  Social History     Substance and Sexual Activity   Alcohol Use No    Comment: denies alcohol use causing problems     Social History     Substance and Sexual Activity   Drug Use No     Social History     Tobacco Use   Smoking Status Never    Passive exposure: Past   Smokeless Tobacco Never     Review of Systems   Constitutional:  Negative for chills, fever and unexpected weight change.   HENT:  Negative for hearing loss, nosebleeds and sore throat.    Eyes:  Negative for pain, redness and visual disturbance.   Respiratory:  Negative for cough, shortness of breath and wheezing.    Cardiovascular:  Negative for chest pain, palpitations and leg swelling.   Gastrointestinal:  Negative for abdominal distention, nausea and vomiting.   Endocrine: Negative for polydipsia and polyuria.   Genitourinary:  Negative for dysuria and hematuria.   Musculoskeletal:  Positive for arthralgias (right knee) and gait problem (Ambulate with a cane).   Skin:  Negative for rash and wound.   Neurological:  Negative for dizziness, numbness and headaches.    Psychiatric/Behavioral:  Negative for decreased concentration and suicidal ideas.          Objective:  BP Readings from Last 1 Encounters:   05/28/24 120/80      Wt Readings from Last 1 Encounters:   05/28/24 50.3 kg (111 lb)      BMI:   Estimated body mass index is 21.68 kg/m² as calculated from the following:    Height as of this encounter: 5' (1.524 m).    Weight as of this encounter: 50.3 kg (111 lb).  BSA:   Estimated body surface area is 1.45 meters squared as calculated from the following:    Height as of this encounter: 5' (1.524 m).    Weight as of this encounter: 50.3 kg (111 lb).   Physical Exam  Vitals and nursing note reviewed.   Constitutional:       Appearance: Normal appearance. She is well-developed.   HENT:      Head: Normocephalic and atraumatic.      Right Ear: External ear normal.      Left Ear: External ear normal.   Eyes:      Extraocular Movements: Extraocular movements intact.      Conjunctiva/sclera: Conjunctivae normal.   Pulmonary:      Effort: Pulmonary effort is normal.   Musculoskeletal:      Cervical back: Neck supple.      Left knee: No effusion.      Instability Tests: Medial Fanny test negative and lateral Fanny test negative.   Skin:     General: Skin is warm and dry.   Neurological:      Mental Status: She is alert and oriented to person, place, and time.      Deep Tendon Reflexes: Reflexes are normal and symmetric.   Psychiatric:         Mood and Affect: Mood normal.         Behavior: Behavior normal.       Left Knee Exam     Tenderness   The patient is experiencing tenderness in the medial joint line.    Range of Motion   Extension:  -5   Flexion:  110     Tests   Fanny:  Medial - negative Lateral - negative  Varus: negative Valgus: negative  Patellar apprehension: negative    Other   Erythema: absent  Sensation: normal  Pulse: present  Swelling: none  Effusion: no effusion present    Comments:  Patellofemoral joint crepitation with knee motion  Varus  alignment            No new imaging    Large joint arthrocentesis: R knee  Universal Protocol:  Consent: Verbal consent obtained.  Risks and benefits: risks, benefits and alternatives were discussed  Consent given by: patient  Patient understanding: patient states understanding of the procedure being performed  Site marked: the operative site was marked  Radiology Images displayed and confirmed. If images not available, report reviewed: imaging studies available  Patient identity confirmed: verbally with patient  Supporting Documentation  Indications: pain and diagnostic evaluation   Procedure Details  Location: knee - R knee  Preparation: Patient was prepped and draped in the usual sterile fashion  Needle size: 22 G  Ultrasound guidance: no  Approach: anterolateral  Medications administered: 6 mg betamethasone acetate-betamethasone sodium phosphate 6 (3-3) mg/mL; 7 mL lidocaine (PF) 1 %    Patient tolerance: patient tolerated the procedure well with no immediate complications  Dressing:  Sterile dressing applied        Scribe Attestation      I,:  Kaushik Chambers am acting as a scribe while in the presence of the attending physician.:       I,:  Celia Du MD personally performed the services described in this documentation    as scribed in my presence.:

## 2024-05-29 ENCOUNTER — HOSPITAL ENCOUNTER (OUTPATIENT)
Dept: RADIOLOGY | Facility: HOSPITAL | Age: 84
Discharge: HOME | End: 2024-05-29
Attending: INTERNAL MEDICINE
Payer: MEDICARE

## 2024-05-29 DIAGNOSIS — R13.10 DYSPHAGIA, UNSPECIFIED TYPE: ICD-10-CM

## 2024-05-29 PROCEDURE — 74230 X-RAY XM SWLNG FUNCJ C+: CPT

## 2024-05-29 PROCEDURE — 92611 MOTION FLUOROSCOPY/SWALLOW: CPT

## 2024-05-29 NOTE — PROCEDURES
Video Swallow Study      Patient Name: Michaela Cruz  Today's Date: 5/29/2024        Past Medical History  Past Medical History:   Diagnosis Date    Acute otitis externa of left ear 04/12/2022    Cardiomyopathy (HCC)     Chronic diarrhea of unknown origin     resolved 12/22/2015    Colon polyp     Contact dermatitis     last assessed 04/19/2013    Esophageal reflux     resolved 07/01/2016    Helicobacter pylori gastrointestinal tract infection 10/25/2018    Hyperlipidemia     resolved 12/22/2015    Muscle weakness (generalized)     resolved 12/22/2015    Pacemaker     Post-viral cough syndrome 12/12/2022    Reactive airway disease     resolved 12/22/2015        Past Surgical History  Past Surgical History:   Procedure Laterality Date    COLONOSCOPY      LEG SURGERY Left     OTHER SURGICAL HISTORY      open treatment of weight bearing distal tibial fracture    UPPER GASTROINTESTINAL ENDOSCOPY             Modified (Video) Barium Swallow Study    Summary:  Images are on PACS for review.     Pt w/ mild oropharyngeal dysphagia, functional at this time. Mildly prolonged mastication w/ slow, piecemeal transfers. Mild oral residue despite piecemeal transfers. Swallow initiation is delayed w/ reduced hyo-laryngeal elevation, incomplete laryngeal vestibule closure, and diminished pharyngeal stripping wave. PES relaxation is reduced and ?CP prominence which results in constricted bolus passage. Reduced driving forces of the swallow and incomplete PES relaxation result in mild pharyngeal residue after the swallow. No aspiration on today's study. Brief screening of the esophagus revealed retention.     MBS findings and recommendations immediately reviewed w/ pt w/ use of images to aid in understanding. Education provided on strategies to optimize swallow safety, including the importance of oral care and s/s aspiration to monitor for and notify medical team of should they arise. Pt verbalized  "understanding and denied questions at this time.       Recommendations:  Diet: Regular, add moisture as needed   Liquids: Thin   Meds: As tolerated   Strategies: Alternate liquids/solids   Frequent oral care  Upright position  F/u ST tx: No ST f/u at this time.   Aspiration Precautions  Reflux Precautions  Consider consult with: GI   Repeat MBS as necessary  If a dedicated assessment of the esophagus is desired, consider esophagram/barium swallow or EGD.      Goals:  Pt will tolerate least restrictive diet w/out s/s aspiration or oral/pharyngeal difficulties.      Patient's goal: \"I wonder if my throat needs to be stretched:       H&P/pertinent provider notes: (PMH noted above)  Michaela Cruz is a 84 y.o. female presenting to establish care.   History of Present Illness  The patient presents for evaluation of dysphagia. She is presenting as a consultation from Dr. Phelps regarding dysphagia. She does have a history of esophageal stricture in the past on her endoscopy in 2015 that was very mild. She had a repeat endoscopy in 2018 that showed mild gastritis and a normal esophagus with unremarkable biopsies.     The patient has been experiencing dysphagia for an extended period. She underwent esophageal dilation in 2015 and is currently experiencing the same issue. She reports occasional difficulty in mastication and swallowing, necessitating multiple attempts to swallow food. This issue has recently intensified. Occasionally, she experiences choking, necessitating regurgitation. She denies any associated pain. Her bowel movements are regular, with no reported hematochezia. Her weight has remained stable, attributing this to her diuretic regimen. She has not previously undergone a barium swallow study. The patient, who has a pacemaker, queries whether her allergic rhinitis could be contributing to her dysphagia.    Special Studies:  -     Previous VBS:  -         Does the pt have pain? No   If yes, was nursing made " aware/was it addressed? N/A     Swallow Mechanism Exam  Facial: symmetrical  Labial: WFL  Lingual: WFL  Velum: symmetrical  Mandible: adequate ROM  Dentition: adequate  Vocal quality:clear/adequate   Volitional Cough: strong/productive   Respiratory Status: on RA     Swallow Information   Current Risks for Dysphagia & Aspiration:  age  Current Symptoms/Concerns:  globus sensation   Current Diet: regular diet and thin liquids   Baseline Diet: regular diet and thin liquids      Consistencies Administered:  Pt was viewed sitting upright in the lateral and AP positions. Trials administered were consistent with [facility] Validated Protocol: 5-mL thin liquid x2, 20-mL cup sip thin, 40-mL sequential swallow thin, 5-mL nectar thick, 20-mL cup sip nectar thick, 40-mL sequential swallow nectar thick, 5-mL Honey thick, 5-mL pudding, ½ cookie coated with 3-mL pudding, 5-mL nectar thick in the AP position, and 5-mL pudding in the AP position. Pt was also given thin liquids by straw, as well as a barium tablet with thin liquid.       Oral Impairment:  Lip Closure: complete   Tongue Control During Bolus Hold: reduced w/ posterior spill   Bolus Preparation/Mastication: mild prolonged  Bolus Transport/Lingual Motion: fairly brisk , piecemeal   Oral Residue: mild   Initiation of the Pharyngeal Swallow: delayed w/ bolus head to pyriforms     Pharyngeal Impairment:  Soft Palate Elevation: inconsistent escape to nasopharynx   Laryngeal Elevation: reduced   Anterior Hyoid Excursion: reduced  Epiglottic Movement: incomplete, min movement   Laryngeal Vestibular Closure: incomplete   Pharyngeal Stripping Wave: present, diminished   Pharyngeal Contraction: reduced   PES Opening: reduced/constricted, ?CP prominence   Tongue Base Retraction:   Pharyngeal Residue: mild, barium tablet retained briefly in the valleculae     Screening of Esophageal Impairment   Esophageal Clearance: noted retention and retropulsion        Penetration/Aspiration:  Thin: no penetration/aspiration (PAS 1)   Nectar: no penetration/aspiration (PAS 1)   Honey: no penetration/aspiration (PAS 1)   Puree: no penetration/aspiration (PAS 1)   Solid: no penetration/aspiration (PAS 1)   Response to Aspiration: N/A   Strategies/Efficacy: -     8-Point Penetration-Aspiration Scale   1 Material does not enter the airway   2 Material enters the airway, remains above the vocal folds, and is ejected  from the  airway    3 Material enters the airway, remains above the vocal folds, and is not ejected from the airway   4 Material enters the airway, contacts the vocal folds, and is ejected from the airway   5 Material enters the airway, contacts the vocal folds, and is not ejected from the airway    6 Material enters the airway, passes below the vocal folds and is ejected into the larynx or out of the airway    7 Material enters the airway, passes below the vocal folds, and is not ejected from the trachea despite effort    8 Material enters the airway, passes below the vocal folds, and no effort is made to eject

## 2024-06-11 ENCOUNTER — HOSPITAL ENCOUNTER (OUTPATIENT)
Dept: NON INVASIVE DIAGNOSTICS | Facility: HOSPITAL | Age: 84
Discharge: HOME | End: 2024-06-11
Payer: MEDICARE

## 2024-06-11 VITALS
DIASTOLIC BLOOD PRESSURE: 80 MMHG | HEART RATE: 70 BPM | HEIGHT: 60 IN | WEIGHT: 111 LBS | BODY MASS INDEX: 21.79 KG/M2 | SYSTOLIC BLOOD PRESSURE: 120 MMHG

## 2024-06-11 DIAGNOSIS — I50.20 UNSPECIFIED SYSTOLIC (CONGESTIVE) HEART FAILURE (HCC): ICD-10-CM

## 2024-06-11 PROCEDURE — 93306 TTE W/DOPPLER COMPLETE: CPT

## 2024-06-11 PROCEDURE — 93356 MYOCRD STRAIN IMG SPCKL TRCK: CPT | Performed by: INTERNAL MEDICINE

## 2024-06-11 PROCEDURE — 93306 TTE W/DOPPLER COMPLETE: CPT | Performed by: INTERNAL MEDICINE

## 2024-06-11 PROCEDURE — 93356 MYOCRD STRAIN IMG SPCKL TRCK: CPT

## 2024-06-13 LAB
AORTIC ROOT: 3.3 CM
AORTIC VALVE MEAN VELOCITY: 7.8 M/S
APICAL FOUR CHAMBER EJECTION FRACTION: 40 %
ASCENDING AORTA: 3.6 CM
AV LVOT MEAN GRADIENT: 1 MMHG
AV MEAN GRADIENT: 3 MMHG
AV REGURGITATION PRESSURE HALF TIME: 482 MS
BSA FOR ECHO PROCEDURE: 1.45 M2
DOP CALC AO VTI: 22.6 CM
DOP CALC LVOT PEAK VEL VTI: 11.7 CM
DOP CALC MV VTI: 24.3 CM
E WAVE DECELERATION TIME: 193 MS
E/A RATIO: 1.29
FRACTIONAL SHORTENING: 21 (ref 28–44)
GLOBAL LONGITUIDAL STRAIN: -11 %
INTERVENTRICULAR SEPTUM IN DIASTOLE (PARASTERNAL SHORT AXIS VIEW): 0.5 CM
INTERVENTRICULAR SEPTUM: 0.5 CM (ref 0.6–1.1)
LA/AORTA RATIO 2D: 1.03
LAAS-AP2: 24 CM2
LAAS-AP4: 23.5 CM2
LEFT ATRIUM SIZE: 3.4 CM
LEFT ATRIUM VOLUME (MOD BIPLANE): 84 ML
LEFT ATRIUM VOLUME INDEX (MOD BIPLANE): 57.9 ML/M2
LEFT INTERNAL DIMENSION IN SYSTOLE: 4.6 CM (ref 2.1–4)
LEFT VENTRICLE DIASTOLIC VOLUME (MOD BIPLANE): 155 ML
LEFT VENTRICLE DIASTOLIC VOLUME INDEX (MOD BIPLANE): 106.9 ML/M2
LEFT VENTRICLE SYSTOLIC VOLUME (MOD BIPLANE): 94 ML
LEFT VENTRICLE SYSTOLIC VOLUME INDEX (MOD BIPLANE): 64.8 ML/M2
LEFT VENTRICULAR INTERNAL DIMENSION IN DIASTOLE: 5.8 CM (ref 3.5–6)
LEFT VENTRICULAR POSTERIOR WALL IN END DIASTOLE: 0.7 CM
LEFT VENTRICULAR STROKE VOLUME: 69 ML
LV EF: 39 %
LVSV (TEICH): 69 ML
MV E'TISSUE VEL-LAT: 8 CM/S
MV E'TISSUE VEL-SEP: 4 CM/S
MV MEAN GRADIENT: 1 MMHG
MV PEAK A VEL: 0.69 M/S
MV PEAK E VEL: 89 CM/S
MV PEAK GRADIENT: 4 MMHG
MV STENOSIS PRESSURE HALF TIME: 56 MS
MV VALVE AREA P 1/2 METHOD: 3.93
RIGHT VENTRICLE ID DIMENSION: 4.4 CM
SL CV AV PEAK GRADIENT RETROGRADE: 80 MMHG
SL CV LV EF: 39
SL CV PED ECHO LEFT VENTRICLE DIASTOLIC VOLUME (MOD BIPLANE) 2D: 168 ML
SL CV PED ECHO LEFT VENTRICLE SYSTOLIC VOLUME (MOD BIPLANE) 2D: 99 ML
TR MAX PG: 43 MMHG
TR PEAK VELOCITY: 3.3 M/S
TRICUSPID ANNULAR PLANE SYSTOLIC EXCURSION: 2.3 CM
TRICUSPID VALVE PEAK REGURGITATION VELOCITY: 3.29 M/S

## 2024-06-19 ENCOUNTER — TELEPHONE (OUTPATIENT)
Age: 84
End: 2024-06-19

## 2024-06-19 NOTE — TELEPHONE ENCOUNTER
Patients GI provider:  Dr. Mills    Number to return call: [phone]    Reason for call: Pt calling to see if the results from her Barium swallow on 05.29 were in yet. I let her know we are waiting on the Dr to review them and advise and that we will have someone call her as soon as he dose.   She understands and says if we want to call her tomorrow to please just make it after 1pm as she has morning appts and wont be home til then.    Scheduled procedure/appointment date if applicable: 05.29.24

## 2024-06-20 NOTE — TELEPHONE ENCOUNTER
I called the patient.  Did not .  Left a voice message with details of the barium swallow along with plan for continued evaluation and treatment with speech and swallow therapist.  Looks like dysphagia is due to lack of coordination of musculatures involved in swallowing.  Can consider EGD with dilatation if with persistent dysphagia.

## 2024-07-09 ENCOUNTER — OFFICE VISIT (OUTPATIENT)
Dept: FAMILY MEDICINE CLINIC | Facility: CLINIC | Age: 84
End: 2024-07-09
Payer: MEDICARE

## 2024-07-09 VITALS
DIASTOLIC BLOOD PRESSURE: 68 MMHG | HEART RATE: 61 BPM | BODY MASS INDEX: 21.79 KG/M2 | WEIGHT: 111 LBS | TEMPERATURE: 98.5 F | RESPIRATION RATE: 16 BRPM | OXYGEN SATURATION: 96 % | SYSTOLIC BLOOD PRESSURE: 144 MMHG | HEIGHT: 60 IN

## 2024-07-09 DIAGNOSIS — H61.23 BILATERAL IMPACTED CERUMEN: Primary | ICD-10-CM

## 2024-07-09 DIAGNOSIS — H60.541 ECZEMA OF RIGHT EXTERNAL EAR: ICD-10-CM

## 2024-07-09 PROCEDURE — 69210 REMOVE IMPACTED EAR WAX UNI: CPT | Performed by: FAMILY MEDICINE

## 2024-07-09 PROCEDURE — 99214 OFFICE O/P EST MOD 30 MIN: CPT | Performed by: FAMILY MEDICINE

## 2024-07-09 RX ORDER — ALCLOMETASONE DIPROPIONATE 0.5 MG/G
CREAM TOPICAL 2 TIMES DAILY
Qty: 45 G | Refills: 0 | Status: SHIPPED | OUTPATIENT
Start: 2024-07-09 | End: 2024-07-23

## 2024-07-09 NOTE — PROGRESS NOTES
Ambulatory Visit  Name: Michaela Cruz      : 1940      MRN: [id number]  Encounter Provider: Angela Phelps DO  Encounter Date: 2024   Encounter department: [facility]    Assessment & Plan   1. Bilateral impacted cerumen  -     Ear cerumen removal  2. Eczema of right external ear  -     alclomethasone (ACLOVATE) 0.05 % cream; Apply topically 2 (two) times a day for 14 days    The ear lavage was performed successfully bilaterally and the TMs were well-visualized.  The cerumen was completely removed.  The patient does have an area of some mild eczema on the right external ear.  We will treat her with the Aclovate cream as ordered.  She will call if there is no improvement over the condition worsens.  We will see her back as scheduled.      Depression Screening and Follow-up Plan: Patient was screened for depression during today's encounter. They screened negative with a PHQ-2 score of 0.      Chief Complaint   Patient presents with   • other     Riight ear sore inside , lump, 2wks  Left ear clogged, I month, 4 days ago used drops       History of Present Illness     Michaela Cruz is a 84 y.o. female presents today for evaluation of possible cerumen impaction and a sore area in her right ear.  She feels like there is wax buildup in both ears.  She feels that the left ear is clogged and there is buzzing in her ear for a month.  She was using Debox- no relief.    There is a sore area in the right ear that she can feel with her finger.  The area is mildly sore and itchy.  There are no fevers.  There are no cold symptoms.  The patient denies any chest pain, shortness of breath, or palpitations.  There is no edema.  There are no headaches or visual changes.  There is no lightheadedness, dizziness, or fainting spells.          Review of Systems   Constitutional: Negative.    HENT:  Positive for ear pain and hearing loss. Negative for ear discharge and facial swelling.    Eyes:  Negative.    Respiratory: Negative.     Cardiovascular: Negative.    Gastrointestinal: Negative.    Endocrine: Negative.    Genitourinary: Negative.    Musculoskeletal: Negative.    Skin: Negative.    Allergic/Immunologic: Negative.    Neurological: Negative.    Hematological: Negative.    Psychiatric/Behavioral: Negative.       Past Medical History:   Diagnosis Date   • Acute otitis externa of left ear 04/12/2022   • Cardiomyopathy (HCC)    • Chronic diarrhea of unknown origin     resolved 12/22/2015   • Colon polyp    • Contact dermatitis     last assessed 04/19/2013   • Esophageal reflux     resolved 07/01/2016   • Helicobacter pylori gastrointestinal tract infection 10/25/2018   • Hyperlipidemia     resolved 12/22/2015   • Muscle weakness (generalized)     resolved 12/22/2015   • Pacemaker    • Post-viral cough syndrome 12/12/2022   • Reactive airway disease     resolved 12/22/2015     Past Surgical History:   Procedure Laterality Date   • COLONOSCOPY     • LEG SURGERY Left    • OTHER SURGICAL HISTORY      open treatment of weight bearing distal tibial fracture   • UPPER GASTROINTESTINAL ENDOSCOPY       Family History   Problem Relation Age of Onset   • Heart disease Father    • Diabetes Daughter    • Mental illness Daughter         disorder     Social History     Tobacco Use   • Smoking status: Never     Passive exposure: Past   • Smokeless tobacco: Never   Vaping Use   • Vaping status: Never Used   Substance and Sexual Activity   • Alcohol use: No     Comment: denies alcohol use causing problems   • Drug use: No   • Sexual activity: Not on file     Current Outpatient Medications on File Prior to Visit   Medication Sig   • amLODIPine (NORVASC) 2.5 mg tablet Take 2.5 mg by mouth daily   • aspirin 81 MG tablet Take 1 tablet by mouth daily   • brimonidine tartrate 0.2 % ophthalmic solution    • calcitriol (ROCALTROL) 0.25 mcg capsule Take 1 capsule (0.25 mcg total) by mouth 2 (two) times a week On Tuesday and  thursday   • cholecalciferol (VITAMIN D3) 1,000 units tablet Take 2 tablets (2,000 Units total) by mouth daily   • denosumab (Prolia) 60 mg/mL as directed   • furosemide (LASIX) 40 mg tablet Take 40 mg by mouth daily     • latanoprost (XALATAN) 0.005 % ophthalmic solution    • loratadine (CLARITIN) 10 mg tablet Take 1 tablet (10 mg total) by mouth daily   • losartan (COZAAR) 100 MG tablet Take 1 tablet (100 mg total) by mouth every 24 hours   • metoprolol succinate (TOPROL-XL) 25 mg 24 hr tablet Take 1 tablet (25 mg total) by mouth daily   • timolol (TIMOPTIC) 0.5 % ophthalmic solution    • [DISCONTINUED] ergocalciferol (ERGOCALCIFEROL) 1.25 MG (69500 UT) capsule Take 1 capsule (50,000 Units total) by mouth once a week (Patient not taking: Reported on 8/4/2023)     No Known Allergies  Immunization History   Administered Date(s) Administered   • COVID-19 PFIZER VACCINE 0.3 ML IM 01/20/2021, 02/11/2021, 11/02/2021, 02/01/2022   • INFLUENZA 09/20/2018   • Influenza Split High Dose Preservative Free IM 10/09/2012, 09/26/2013, 09/29/2014, 10/28/2015, 09/29/2016, 10/17/2017   • Influenza, high dose seasonal 0.7 mL 09/20/2018, 09/26/2019, 09/19/2022, 10/16/2023   • Pneumococcal Conjugate 13-Valent 12/22/2015, 09/26/2019   • Pneumococcal Polysaccharide PPV23 10/17/2005, 11/10/2011   • Td (adult), adsorbed 07/05/2022   • Tuberculin Skin Test-PPD Intradermal 11/13/2006, 11/20/2006     Objective     /68 (BP Location: Left arm, Patient Position: Sitting, Cuff Size: Standard)   Pulse 61   Temp 98.5 °F (36.9 °C) (Tympanic)   Resp 16   Ht 5' (1.524 m)   Wt 50.3 kg (111 lb)   LMP  (LMP Unknown)   SpO2 96%   BMI 21.68 kg/m²     Physical Exam  Constitutional:       General: She is not in acute distress.     Appearance: Normal appearance. She is well-developed. She is not diaphoretic.   HENT:      Head: Normocephalic and atraumatic.      Right Ear: Tympanic membrane, ear canal and external ear normal. There is impacted  cerumen.      Left Ear: Tympanic membrane, ear canal and external ear normal. There is impacted cerumen.      Ears:      Comments: There is a dry scabbed area in the pinna of the patient's right ear with irritation.     Nose: Nose normal.      Mouth/Throat:      Mouth: Mucous membranes are moist.      Pharynx: Oropharynx is clear. No oropharyngeal exudate.   Eyes:      General: No scleral icterus.        Right eye: No discharge.         Left eye: No discharge.      Extraocular Movements: Extraocular movements intact.      Pupils: Pupils are equal, round, and reactive to light.   Neck:      Thyroid: No thyromegaly.      Vascular: No JVD.      Trachea: No tracheal deviation.   Cardiovascular:      Rate and Rhythm: Normal rate and regular rhythm.      Heart sounds: Normal heart sounds. No murmur heard.     No friction rub. No gallop.   Pulmonary:      Effort: Pulmonary effort is normal. No respiratory distress.      Breath sounds: Normal breath sounds. No wheezing or rales.   Chest:      Chest wall: No tenderness.   Abdominal:      General: Bowel sounds are normal. There is no distension.      Palpations: Abdomen is soft. There is no mass.      Tenderness: There is no abdominal tenderness. There is no guarding or rebound.      Hernia: No hernia is present.   Musculoskeletal:         General: No tenderness or deformity. Normal range of motion.      Cervical back: Normal range of motion and neck supple.   Lymphadenopathy:      Cervical: No cervical adenopathy.   Skin:     General: Skin is warm and dry.      Coloration: Skin is not pale.      Findings: No erythema or rash.      Comments: There is dry scaly skin in the upper part of the patient's pinna on the right side with no visible lesion.   Neurological:      Mental Status: She is alert and oriented to person, place, and time.      Cranial Nerves: No cranial nerve deficit.      Sensory: No sensory deficit.      Motor: No abnormal muscle tone.      Coordination:  "Coordination normal.      Deep Tendon Reflexes: Reflexes normal.   Psychiatric:         Mood and Affect: Mood normal.         Behavior: Behavior normal.         Thought Content: Thought content normal.         Judgment: Judgment normal.     Ear cerumen removal    Date/Time: 7/9/2024 2:40 PM    Performed by: Angela Phelps DO  Authorized by: Angela Phelps DO  Universal Protocol:  Consent: Verbal consent obtained. Written consent not obtained.  Risks and benefits: risks, benefits and alternatives were discussed  Consent given by: patient  Time out: Immediately prior to procedure a \"time out\" was called to verify the correct patient, procedure, equipment, support staff and site/side marked as required.  Timeout called at: 7/12/2024 2:45 PM.  Patient understanding: patient states understanding of the procedure being performed  Patient consent: the patient's understanding of the procedure matches consent given  Procedure consent: procedure consent matches procedure scheduled    Patient location:  Clinic  Procedure details:     Local anesthetic:  None    Location:  L ear and R ear    Procedure type: irrigation with instrumentation      Instrumentation: loop      Approach:  External    Visualization (free text):  Bilateral cerumen impaction    Equipment used:  Ear lavage and lighted cerumen loop.  Post-procedure details:     Complication:  None    Hearing quality:  Normal    Patient tolerance of procedure:  Tolerated well, no immediate complications      Administrative Statements   I have spent a total time of 40 minutes in caring for this patient on the day of the visit/encounter including Diagnostic results, Prognosis, Risks and benefits of tx options, Instructions for management, Patient and family education, Importance of tx compliance, Risk factor reductions, Impressions, Counseling / Coordination of care, Documenting in the medical record, Reviewing / ordering tests, medicine, procedures  , and Obtaining or " reviewing history  .

## 2024-07-10 ENCOUNTER — TELEPHONE (OUTPATIENT)
Age: 84
End: 2024-07-10

## 2024-07-10 NOTE — TELEPHONE ENCOUNTER
Patient states Dr. Hernandez, Rheumatologist told her she should be taking calcitriol three times a week but Dr. Elizalde prescribed it twice a week. Patient asking for Dr. Elizalde's advice. Thank you.

## 2024-07-11 NOTE — TELEPHONE ENCOUNTER
Patient called back- she asked to speak with Bushra, because she spoke with her yesterday and preferred to speak with her again. Warm transferred to Bushra, no further actions needed on my end.

## 2024-07-11 NOTE — TELEPHONE ENCOUNTER
Luis for Vera, advised she should continue calcitriol 2 times a week . And should complete any labs she is due for based on results  will make proper changes.

## 2024-07-11 NOTE — TELEPHONE ENCOUNTER
Patient returning call, relayed above message and she expressed understanding. She will get labs done.   No further action needed.

## 2024-07-11 NOTE — TELEPHONE ENCOUNTER
Patient calling again regarding calcitriol dose. Made aware message was sent to provider. Please advise, thank you.

## 2024-08-02 ENCOUNTER — APPOINTMENT (OUTPATIENT)
Dept: LAB | Facility: CLINIC | Age: 84
End: 2024-08-02
Payer: MEDICARE

## 2024-08-02 DIAGNOSIS — I34.0 NONRHEUMATIC MITRAL VALVE REGURGITATION: ICD-10-CM

## 2024-08-02 DIAGNOSIS — Z95.0 PACEMAKER: ICD-10-CM

## 2024-08-02 DIAGNOSIS — I10 ESSENTIAL HYPERTENSION, MALIGNANT: ICD-10-CM

## 2024-08-02 DIAGNOSIS — I50.20 SYSTOLIC HEART FAILURE, UNSPECIFIED HF CHRONICITY (HCC): ICD-10-CM

## 2024-08-02 DIAGNOSIS — I42.8 CARDIOMYOPATHY, NONISCHEMIC (HCC): ICD-10-CM

## 2024-08-02 DIAGNOSIS — I36.1 NONRHEUMATIC TRICUSPID VALVE REGURGITATION: ICD-10-CM

## 2024-08-02 DIAGNOSIS — I49.3 PREMATURE COMPLEX, VENTRICULAR: ICD-10-CM

## 2024-08-02 LAB
ANION GAP SERPL CALCULATED.3IONS-SCNC: 9 MMOL/L (ref 4–13)
BUN SERPL-MCNC: 22 MG/DL (ref 5–25)
CALCIUM SERPL-MCNC: 9.6 MG/DL (ref 8.4–10.2)
CHLORIDE SERPL-SCNC: 100 MMOL/L (ref 96–108)
CO2 SERPL-SCNC: 27 MMOL/L (ref 21–32)
CREAT SERPL-MCNC: 0.81 MG/DL (ref 0.6–1.3)
GFR SERPL CREATININE-BSD FRML MDRD: 66 ML/MIN/1.73SQ M
GLUCOSE P FAST SERPL-MCNC: 93 MG/DL (ref 65–99)
POTASSIUM SERPL-SCNC: 4.4 MMOL/L (ref 3.5–5.3)
SODIUM SERPL-SCNC: 136 MMOL/L (ref 135–147)

## 2024-08-02 PROCEDURE — 36415 COLL VENOUS BLD VENIPUNCTURE: CPT

## 2024-08-02 PROCEDURE — 80048 BASIC METABOLIC PNL TOTAL CA: CPT

## 2024-08-06 ENCOUNTER — RA CDI HCC (OUTPATIENT)
Dept: OTHER | Facility: HOSPITAL | Age: 84
End: 2024-08-06

## 2024-08-20 ENCOUNTER — HOSPITAL ENCOUNTER (OUTPATIENT)
Dept: BONE DENSITY | Facility: CLINIC | Age: 84
Discharge: HOME | End: 2024-08-20
Payer: MEDICARE

## 2024-08-20 ENCOUNTER — OFFICE VISIT (OUTPATIENT)
Dept: FAMILY MEDICINE CLINIC | Facility: CLINIC | Age: 84
End: 2024-08-20
Payer: MEDICARE

## 2024-08-20 VITALS
OXYGEN SATURATION: 96 % | BODY MASS INDEX: 21.55 KG/M2 | RESPIRATION RATE: 14 BRPM | SYSTOLIC BLOOD PRESSURE: 140 MMHG | TEMPERATURE: 97.8 F | HEIGHT: 60 IN | DIASTOLIC BLOOD PRESSURE: 78 MMHG | HEART RATE: 63 BPM | WEIGHT: 109.8 LBS

## 2024-08-20 DIAGNOSIS — Z00.00 MEDICARE ANNUAL WELLNESS VISIT, SUBSEQUENT: Primary | ICD-10-CM

## 2024-08-20 DIAGNOSIS — I10 PRIMARY HYPERTENSION: ICD-10-CM

## 2024-08-20 DIAGNOSIS — Z13.6 SCREENING FOR CARDIOVASCULAR CONDITION: ICD-10-CM

## 2024-08-20 DIAGNOSIS — Z23 ENCOUNTER FOR IMMUNIZATION: ICD-10-CM

## 2024-08-20 DIAGNOSIS — M81.0 AGE-RELATED OSTEOPOROSIS WITHOUT CURRENT PATHOLOGICAL FRACTURE: ICD-10-CM

## 2024-08-20 PROCEDURE — G0009 ADMIN PNEUMOCOCCAL VACCINE: HCPCS

## 2024-08-20 PROCEDURE — G0439 PPPS, SUBSEQ VISIT: HCPCS

## 2024-08-20 PROCEDURE — 90677 PCV20 VACCINE IM: CPT

## 2024-08-20 PROCEDURE — 77080 DXA BONE DENSITY AXIAL: CPT

## 2024-08-20 RX ORDER — BUMETANIDE 1 MG/1
1 TABLET ORAL DAILY
COMMUNITY
Start: 2024-07-11 | End: 2025-07-11

## 2024-08-20 RX ORDER — AMLODIPINE BESYLATE 5 MG/1
TABLET ORAL
COMMUNITY
Start: 2024-07-11

## 2024-08-20 NOTE — PROGRESS NOTES
Ambulatory Visit  Name: Michaela Cruz      : 1940      MRN: [id number]  Encounter Provider: Angela Phelps DO  Encounter Date: 2024   Encounter department: [facility]    Assessment & Plan   1. Medicare annual wellness visit, subsequent  2. Primary hypertension  Assessment & Plan:  The patient's blood pressure is under reasonable control for her age.  We have made no changes today.  She will continue with her current medications.  Orders:  -     Lipid panel; Future  -     TSH, 3rd generation; Future  3. Screening for cardiovascular condition  -     Lipid panel; Future  -     TSH, 3rd generation; Future  4. Encounter for immunization  -     Pneumococcal Conjugate Vaccine 20-valent (Pcv20)    Depression Screening and Follow-up Plan: Patient was screened for depression during today's encounter. They screened negative with a PHQ-2 score of 0.    Urinary Incontinence Plan of Care: counseling topics discussed: practice Kegel (pelvic floor strengthening) exercises, limit alcohol, caffeine, spicy foods, and acidic foods and limiting fluid intake 3-4 hours before bed.       The patient will continue with her current medication and will continue to follow-up with her cardiologist as scheduled.  She will follow-up with her other specialist as scheduled.  We will see her back as scheduled.  Preventive health issues were discussed with patient, and age appropriate screening tests were ordered as noted in patient's After Visit Summary. Personalized health advice and appropriate referrals for health education or preventive services given if needed, as noted in patient's After Visit Summary.  Chief Complaint   Patient presents with   • Medicare Wellness Visit       History of Present Illness     Michaela Cruz is a 84 y.o. female who presents today for a Subsequent Medicare Wellness visit. The patient denies any chest pain, shortness of breath, or palpitations.  There is no edema.  There are  no headaches or visual changes.  There is no lightheadedness, dizziness, or fainting spells.  There is pain in her neck on and off.   The patient currently denies any nausea, vomiting, or GERD symptoms.  she has normal bowel movements and normal urine output.  she has a normal appetite.  She is eating 3 meals a day.    She sees Dr. Hernandez for the Prolia injections for her osteoporosis.    She sees Cardiology.             Patient Care Team:  Angela Phelps DO as PCP - General (Family Medicine)  Loki Prakash DO (Cardiology)  Arash Carlos MD (Orthopedic Surgery)  Jodi Barrera DO (Nephrology)  Monica Elizalde MD (Nephrology)    Review of Systems   Constitutional: Negative.    HENT: Negative.     Eyes: Negative.    Respiratory: Negative.     Cardiovascular: Negative.    Gastrointestinal: Negative.    Endocrine: Negative.    Genitourinary: Negative.    Musculoskeletal: Negative.    Skin: Negative.    Allergic/Immunologic: Negative.    Neurological: Negative.    Hematological: Negative.    Psychiatric/Behavioral: Negative.       Medical History Reviewed by provider this encounter:  Tobacco  Allergies  Meds  Problems  Med Hx  Surg Hx  Fam Hx  Soc   Hx      Annual Wellness Visit Questionnaire   Michaela is here for her Subsequent Wellness visit. Last Medicare Wellness visit information reviewed, patient interviewed and updates made to the record today.      Health Risk Assessment:   Patient rates overall health as fair. Patient feels that their physical health rating is same. Patient is satisfied with their life. Eyesight was rated as same. Hearing was rated as same. Patient feels that their emotional and mental health rating is same. Patient states they are sometimes unusually tired/fatigued. Pain experienced in the last 7 days has been some. Patient's pain rating has been 7/10. Patient states that she has experienced no weight loss or gain in last 6 months. Pain in neck down lower back, around 4PM , when  on her feet a lot- it is a 7/10- when standing a lot.    Depression Screening:   PHQ-2 Score: 0      Fall Risk Screening:   In the past year, patient has experienced: no history of falling in past year      Urinary Incontinence Screening:   Patient has leaked urine accidently in the last six months.     Home Safety:  Patient has trouble with stairs inside or outside of their home. Patient has working smoke alarms and has working carbon monoxide detector.     Nutrition:   Current diet is Regular and Limited junk food. Low salt, 3 meals a day    Medications:   Patient is currently taking over-the-counter supplements. OTC medications include: see medication list. Patient is able to manage medications.     Activities of Daily Living (ADLs)/Instrumental Activities of Daily Living (IADLs):   Walk and transfer into and out of bed and chair?: Yes  Dress and groom yourself?: Yes    Bathe or shower yourself?: Yes    Feed yourself? Yes  Do your laundry/housekeeping?: Yes  Manage your money, pay your bills and track your expenses?: Yes  Make your own meals?: Yes    Do your own shopping?: Yes    Previous Hospitalizations:   Any hospitalizations or ED visits within the last 12 months?: No      Advance Care Planning:   Living will: Yes    Durable POA for healthcare: Yes    Advanced directive: Yes    Advanced directive counseling given: Yes    Five wishes given: No    Patient declined ACP directive: No    End of Life Decisions reviewed with patient: Yes    Provider agrees with end of life decisions: Yes      Comments: Her daughter is POA.      Cognitive Screening:   Provider or family/friend/caregiver concerned regarding cognition?: No    PREVENTIVE SCREENINGS      Cardiovascular Screening:    General: Screening Current and Risks and Benefits Discussed      Diabetes Screening:     General: Screening Current and Risks and Benefits Discussed      Colorectal Cancer Screening:     General: Screening Not Indicated      Breast Cancer  Screening:     General: Patient Declines and Screening Not Indicated      Cervical Cancer Screening:    General: Screening Not Indicated      Osteoporosis Screening:    General: Screening Not Indicated and History Osteoporosis      Abdominal Aortic Aneurysm (AAA) Screening:        General: Screening Not Indicated      Lung Cancer Screening:     General: Screening Not Indicated      Hepatitis C Screening:    General: Screening Not Indicated    Screening, Brief Intervention, and Referral to Treatment (SBIRT)    Screening  Typical number of drinks in a day: 0  Typical number of drinks in a week: 0  Interpretation: Low risk drinking behavior.    AUDIT-C Screenin) How often did you have a drink containing alcohol in the past year? monthly or less  2) How many drinks did you have on a typical day when you were drinking in the past year? 1 to 2  3) How often did you have 6 or more drinks on one occasion in the past year? never    AUDIT-C Score: 1  Interpretation: Score 0-2 (female): Negative screen for alcohol misuse    Single Item Drug Screening:  How often have you used an illegal drug (including marijuana) or a prescription medication for non-medical reasons in the past year? never    Single Item Drug Screen Score: 0  Interpretation: Negative screen for possible drug use disorder    Brief Intervention  Alcohol & drug use screenings were reviewed. No concerns regarding substance use disorder identified.     Other Counseling Topics:   Car/seat belt/driving safety, skin self-exam, sunscreen and calcium and vitamin D intake and regular weightbearing exercise.     Social Determinants of Health     Financial Resource Strain: Low Risk  (8/15/2023)    Overall Financial Resource Strain (CARDIA)    • Difficulty of Paying Living Expenses: Not hard at all   Food Insecurity: No Food Insecurity (2024)    Hunger Vital Sign    • Worried About Running Out of Food in the Last Year: Never true    • Ran Out of Food in the Last  Year: Never true   Transportation Needs: No Transportation Needs (8/20/2024)    PRAPARE - Transportation    • Lack of Transportation (Medical): No    • Lack of Transportation (Non-Medical): No   Housing Stability: Low Risk  (8/20/2024)    Housing Stability Vital Sign    • Unable to Pay for Housing in the Last Year: No    • Number of Times Moved in the Last Year: 0    • Homeless in the Last Year: No   Utilities: Not At Risk (8/20/2024)    [facility] Utilities    • Threatened with loss of utilities: No     Vision Screening    Right eye Left eye Both eyes   Without correction   20/25   With correction      Comments: Sees the eye doctor.       Objective     /78 (BP Location: Right arm, Patient Position: Sitting, Cuff Size: Standard)   Pulse 63   Temp 97.8 °F (36.6 °C) (Tympanic)   Resp 14   Ht 5' (1.524 m)   Wt 49.8 kg (109 lb 12.8 oz)   LMP  (LMP Unknown)   SpO2 96%   BMI 21.44 kg/m²     Physical Exam  Constitutional:       General: She is not in acute distress.     Appearance: Normal appearance. She is well-developed. She is not diaphoretic.   HENT:      Head: Normocephalic and atraumatic.      Right Ear: Tympanic membrane, ear canal and external ear normal.      Left Ear: Tympanic membrane, ear canal and external ear normal.      Nose: Nose normal.      Mouth/Throat:      Mouth: Mucous membranes are moist.      Pharynx: Oropharynx is clear. No oropharyngeal exudate.   Eyes:      General: No scleral icterus.        Right eye: No discharge.         Left eye: No discharge.      Extraocular Movements: Extraocular movements intact.      Pupils: Pupils are equal, round, and reactive to light.   Neck:      Thyroid: No thyromegaly.      Vascular: No JVD.      Trachea: No tracheal deviation.   Cardiovascular:      Rate and Rhythm: Normal rate and regular rhythm.      Heart sounds: Normal heart sounds. No murmur heard.     No friction rub. No gallop.   Pulmonary:      Effort: Pulmonary effort is normal. No respiratory  distress.      Breath sounds: Normal breath sounds. No wheezing or rales.   Chest:      Chest wall: No tenderness.   Abdominal:      General: Bowel sounds are normal. There is no distension.      Palpations: Abdomen is soft. There is no mass.      Tenderness: There is no abdominal tenderness. There is no guarding or rebound.      Hernia: No hernia is present.   Musculoskeletal:         General: No tenderness or deformity. Normal range of motion.      Cervical back: Normal range of motion and neck supple.   Lymphadenopathy:      Cervical: No cervical adenopathy.   Skin:     General: Skin is warm and dry.      Coloration: Skin is not pale.      Findings: No erythema or rash.   Neurological:      Mental Status: She is alert and oriented to person, place, and time.      Cranial Nerves: No cranial nerve deficit.      Sensory: No sensory deficit.      Motor: No abnormal muscle tone.      Coordination: Coordination normal.      Deep Tendon Reflexes: Reflexes normal.   Psychiatric:         Mood and Affect: Mood normal.         Behavior: Behavior normal.         Thought Content: Thought content normal.         Judgment: Judgment normal.       Administrative Statements   I have spent a total time of 40 minutes in caring for this patient on the day of the visit/encounter including Diagnostic results, Prognosis, Risks and benefits of tx options, Instructions for management, Patient and family education, Importance of tx compliance, Risk factor reductions, Impressions, Counseling / Coordination of care, Documenting in the medical record, and Reviewing / ordering tests, medicine, procedures  .

## 2024-08-20 NOTE — PATIENT INSTRUCTIONS
Medicare Preventive Visit Patient Instructions  Thank you for completing your Welcome to Medicare Visit or Medicare Annual Wellness Visit today. Your next wellness visit will be due in one year (8/21/2025).  The screening/preventive services that you may require over the next 5-10 years are detailed below. Some tests may not apply to you based off risk factors and/or age. Screening tests ordered at today's visit but not completed yet may show as past due. Also, please note that scanned in results may not display below.  Preventive Screenings:  Service Recommendations Previous Testing/Comments   Colorectal Cancer Screening  * Colonoscopy    * Fecal Occult Blood Test (FOBT)/Fecal Immunochemical Test (FIT)  * Fecal DNA/Cologuard Test  * Flexible Sigmoidoscopy Age: 45-75 years old   Colonoscopy: every 10 years (may be performed more frequently if at higher risk)  OR  FOBT/FIT: every 1 year  OR  Cologuard: every 3 years  OR  Sigmoidoscopy: every 5 years  Screening may be recommended earlier than age 45 if at higher risk for colorectal cancer. Also, an individualized decision between you and your healthcare provider will decide whether screening between the ages of 76-85 would be appropriate. Colonoscopy: 05/29/2012  FOBT/FIT: Not on file  Cologuard: Not on file  Sigmoidoscopy: Not on file          Breast Cancer Screening Age: 40+ years old  Frequency: every 1-2 years  Not required if history of left and right mastectomy Mammogram: 11/06/2018        Cervical Cancer Screening Between the ages of 21-29, pap smear recommended once every 3 years.   Between the ages of 30-65, can perform pap smear with HPV co-testing every 5 years.   Recommendations may differ for women with a history of total hysterectomy, cervical cancer, or abnormal pap smears in past. Pap Smear: Not on file    Screening Not Indicated   Hepatitis C Screening Once for adults born between 1945 and 1965  More frequently in patients at high risk for Hepatitis C  Hep C Antibody: Not on file        Diabetes Screening 1-2 times per year if you're at risk for diabetes or have pre-diabetes Fasting glucose: 93 mg/dL (8/2/2024)  A1C: No results in last 5 years (No results in last 5 years)  Screening Current   Cholesterol Screening Once every 5 years if you don't have a lipid disorder. May order more often based on risk factors. Lipid panel: 10/16/2023    Screening Current     Other Preventive Screenings Covered by Medicare:  Abdominal Aortic Aneurysm (AAA) Screening: covered once if your at risk. You're considered to be at risk if you have a family history of AAA.  Lung Cancer Screening: covers low dose CT scan once per year if you meet all of the following conditions: (1) Age 55-77; (2) No signs or symptoms of lung cancer; (3) Current smoker or have quit smoking within the last 15 years; (4) You have a tobacco smoking history of at least 20 pack years (packs per day multiplied by number of years you smoked); (5) You get a written order from a healthcare provider.  Glaucoma Screening: covered annually if you're considered high risk: (1) You have diabetes OR (2) Family history of glaucoma OR (3)  aged 50 and older OR (4)  American aged 65 and older  Osteoporosis Screening: covered every 2 years if you meet one of the following conditions: (1) You're estrogen deficient and at risk for osteoporosis based off medical history and other findings; (2) Have a vertebral abnormality; (3) On glucocorticoid therapy for more than 3 months; (4) Have primary hyperparathyroidism; (5) On osteoporosis medications and need to assess response to drug therapy.   Last bone density test (DXA Scan): 08/20/2024.  HIV Screening: covered annually if you're between the age of 15-65. Also covered annually if you are younger than 15 and older than 65 with risk factors for HIV infection. For pregnant patients, it is covered up to 3 times per pregnancy.    Immunizations:  Immunization  Recommendations   Influenza Vaccine Annual influenza vaccination during flu season is recommended for all persons aged >= 6 months who do not have contraindications   Pneumococcal Vaccine   * Pneumococcal conjugate vaccine = PCV13 (Prevnar 13), PCV15 (Vaxneuvance), PCV20 (Prevnar 20)  * Pneumococcal polysaccharide vaccine = PPSV23 (Pneumovax) Adults 19-65 yo with certain risk factors or if 65+ yo  If never received any pneumonia vaccine: recommend Prevnar 20 (PCV20)  Give PCV20 if previously received 1 dose of PCV13 or PPSV23   Hepatitis B Vaccine 3 dose series if at intermediate or high risk (ex: diabetes, end stage renal disease, liver disease)   Respiratory syncytial virus (RSV) Vaccine - COVERED BY MEDICARE PART D  * RSVPreF3 (Arexvy) CDC recommends that adults 60 years of age and older may receive a single dose of RSV vaccine using shared clinical decision-making (SCDM)   Tetanus (Td) Vaccine - COST NOT COVERED BY MEDICARE PART B Following completion of primary series, a booster dose should be given every 10 years to maintain immunity against tetanus. Td may also be given as tetanus wound prophylaxis.   Tdap Vaccine - COST NOT COVERED BY MEDICARE PART B Recommended at least once for all adults. For pregnant patients, recommended with each pregnancy.   Shingles Vaccine (Shingrix) - COST NOT COVERED BY MEDICARE PART B  2 shot series recommended in those 19 years and older who have or will have weakened immune systems or those 50 years and older     Health Maintenance Due:  There are no preventive care reminders to display for this patient.  Immunizations Due:      Topic Date Due   • COVID-19 Vaccine (5 - 2023-24 season) 09/01/2023   • Influenza Vaccine (1) 09/01/2024     Advance Directives   What are advance directives?  Advance directives are legal documents that state your wishes and plans for medical care. These plans are made ahead of time in case you lose your ability to make decisions for yourself. Advance  directives can apply to any medical decision, such as the treatments you want, and if you want to donate organs.   What are the types of advance directives?  There are many types of advance directives, and each state has rules about how to use them. You may choose a combination of any of the following:  Living will:  This is a written record of the treatment you want. You can also choose which treatments you do not want, which to limit, and which to stop at a certain time. This includes surgery, medicine, IV fluid, and tube feedings.   Durable power of  for healthcare (DPAHC):  This is a written record that states who you want to make healthcare choices for you when you are unable to make them for yourself. This person, called a proxy, is usually a family member or a friend. You may choose more than 1 proxy.  Do not resuscitate (DNR) order:  A DNR order is used in case your heart stops beating or you stop breathing. It is a request not to have certain forms of treatment, such as CPR. A DNR order may be included in other types of advance directives.  Medical directive:  This covers the care that you want if you are in a coma, near death, or unable to make decisions for yourself. You can list the treatments you want for each condition. Treatment may include pain medicine, surgery, blood transfusions, dialysis, IV or tube feedings, and a ventilator (breathing machine).  Values history:  This document has questions about your views, beliefs, and how you feel and think about life. This information can help others choose the care that you would choose.  Why are advance directives important?  An advance directive helps you control your care. Although spoken wishes may be used, it is better to have your wishes written down. Spoken wishes can be misunderstood, or not followed. Treatments may be given even if you do not want them. An advance directive may make it easier for your family to make difficult choices about  your care.   Urinary Incontinence   Urinary incontinence (UI)  is when you lose control of your bladder. UI develops because your bladder cannot store or empty urine properly. The 3 most common types of UI are stress incontinence, urge incontinence, or both.  Medicines:   May be given to help strengthen your bladder control. Report any side effects of medication to your healthcare provider.  Do pelvic muscle exercises often:  Your pelvic muscles help you stop urinating. Squeeze these muscles tight for 5 seconds, then relax for 5 seconds. Gradually work up to squeezing for 10 seconds. Do 3 sets of 15 repetitions a day, or as directed. This will help strengthen your pelvic muscles and improve bladder control.  Train your bladder:  Go to the bathroom at set times, such as every 2 hours, even if you do not feel the urge to go. You can also try to hold your urine when you feel the urge to go. For example, hold your urine for 5 minutes when you feel the urge to go. As that becomes easier, hold your urine for 10 minutes.   Self-care:   Keep a UI record.  Write down how often you leak urine and how much you leak. Make a note of what you were doing when you leaked urine.  Drink liquids as directed. You may need to limit the amount of liquid you drink to help control your urine leakage. Do not drink any liquid right before you go to bed. Limit or do not have drinks that contain caffeine or alcohol.   Prevent constipation.  Eat a variety of high-fiber foods. Good examples are high-fiber cereals, beans, vegetables, and whole-grain breads. Walking is the best way to trigger your intestines to have a bowel movement.  Exercise regularly and maintain a healthy weight.  Weight loss and exercise will decrease pressure on your bladder and help you control your leakage.   Use a catheter as directed  to help empty your bladder. A catheter is a tiny, plastic tube that is put into your bladder to drain your urine.   Go to behavior therapy  as directed.  Behavior therapy may be used to help you learn to control your urge to urinate.     © Copyright [employer] 2018 Information is for End User's use only and may not be sold, redistributed or otherwise used for commercial purposes. All illustrations and images included in CareNotes® are the copyrighted property of [employer] or [employer]       No

## 2024-08-21 NOTE — ASSESSMENT & PLAN NOTE
The patient's blood pressure is under reasonable control for her age.  We have made no changes today.  She will continue with her current medications.

## 2024-08-29 ENCOUNTER — OFFICE VISIT (OUTPATIENT)
Dept: OBGYN CLINIC | Facility: CLINIC | Age: 84
End: 2024-08-29
Payer: MEDICARE

## 2024-08-29 VITALS
WEIGHT: 110 LBS | SYSTOLIC BLOOD PRESSURE: 122 MMHG | HEIGHT: 60 IN | BODY MASS INDEX: 21.6 KG/M2 | DIASTOLIC BLOOD PRESSURE: 80 MMHG

## 2024-08-29 DIAGNOSIS — M17.11 PRIMARY OSTEOARTHRITIS OF RIGHT KNEE: Primary | ICD-10-CM

## 2024-08-29 PROCEDURE — 99213 OFFICE O/P EST LOW 20 MIN: CPT | Performed by: ORTHOPAEDIC SURGERY

## 2024-08-29 PROCEDURE — 20610 DRAIN/INJ JOINT/BURSA W/O US: CPT | Performed by: ORTHOPAEDIC SURGERY

## 2024-08-29 RX ORDER — LIDOCAINE HYDROCHLORIDE 10 MG/ML
7 INJECTION, SOLUTION EPIDURAL; INFILTRATION; INTRACAUDAL; PERINEURAL
Status: COMPLETED | OUTPATIENT
Start: 2024-08-29 | End: 2024-08-29

## 2024-08-29 RX ORDER — BETAMETHASONE SODIUM PHOSPHATE AND BETAMETHASONE ACETATE 3; 3 MG/ML; MG/ML
6 INJECTION, SUSPENSION INTRA-ARTICULAR; INTRALESIONAL; INTRAMUSCULAR; SOFT TISSUE
Status: COMPLETED | OUTPATIENT
Start: 2024-08-29 | End: 2024-08-29

## 2024-08-29 RX ADMIN — LIDOCAINE HYDROCHLORIDE 7 ML: 10 INJECTION, SOLUTION EPIDURAL; INFILTRATION; INTRACAUDAL; PERINEURAL at 10:15

## 2024-08-29 RX ADMIN — BETAMETHASONE SODIUM PHOSPHATE AND BETAMETHASONE ACETATE 6 MG: 3; 3 INJECTION, SUSPENSION INTRA-ARTICULAR; INTRALESIONAL; INTRAMUSCULAR; SOFT TISSUE at 10:15

## 2024-08-29 NOTE — ASSESSMENT & PLAN NOTE
Findings consistent with advanced right knee osteoarthritis severe medially. Patient was given repeat cortisone in right knee, tolerated procedure well, cold compress today. Repeat CSI in 3-4 months as needed. Stationary bike, elliptical, walking on flat ground for low impact exercise. Tylenol, voltaren gel as needed for pain. This note is created using dictation transcription. It may contain typographical errors, grammatical errors, improperly dictated works, background noise and other errors.

## 2024-08-29 NOTE — PROGRESS NOTES
Assessment:     1. Primary osteoarthritis of right knee        Plan:     Problem List Items Addressed This Visit          Musculoskeletal and Integument    Primary osteoarthritis of right knee - Primary     Findings consistent with advanced right knee osteoarthritis severe medially. Patient was given repeat cortisone in right knee, tolerated procedure well, cold compress today. Repeat CSI in 3-4 months as needed. Stationary bike, elliptical, walking on flat ground for low impact exercise. Tylenol, voltaren gel as needed for pain. This note is created using dictation transcription. It may contain typographical errors, grammatical errors, improperly dictated works, background noise and other errors.          Relevant Medications    lidocaine (PF) (XYLOCAINE-MPF) 1 % injection 7 mL (Completed)    betamethasone acetate-betamethasone sodium phosphate (CELESTONE) injection 6 mg (Completed)    Other Relevant Orders    Large joint arthrocentesis: R knee (Completed)      Subjective:     Patient ID: Michaela Cruz is a 84 y.o. female.  Chief Complaint:  84 y.o. female presents to the office for follow up of right knee osteoarthritis. Patient completed a visco supplementation series on 2/22/24 with minimal noted benefit. Last CSI 5/28/24 which continues to provide relief but has worn off. She continues to c/o anteromedial based right knee pain. Pain is worse with prolonged ambulation and standing. She denies any mechanical symptoms about the knee. She is requesting to repeat a cortisone injection today into her right knee. No numbness or tingling. No fevers or chills.     Allergy:  No Known Allergies  Medications:  all current active meds have been reviewed  Past Medical History:  Past Medical History:   Diagnosis Date    Acute otitis externa of left ear 04/12/2022    Cardiomyopathy (HCC)     Chronic diarrhea of unknown origin     resolved 12/22/2015    Colon polyp     Contact dermatitis     last assessed 04/19/2013     Esophageal reflux     resolved 07/01/2016    Helicobacter pylori gastrointestinal tract infection 10/25/2018    Hyperlipidemia     resolved 12/22/2015    Muscle weakness (generalized)     resolved 12/22/2015    Pacemaker     Post-viral cough syndrome 12/12/2022    Reactive airway disease     resolved 12/22/2015     Past Surgical History:  Past Surgical History:   Procedure Laterality Date    COLONOSCOPY      LEG SURGERY Left     OTHER SURGICAL HISTORY      open treatment of weight bearing distal tibial fracture    UPPER GASTROINTESTINAL ENDOSCOPY       Family History:  Family History   Problem Relation Age of Onset    Heart disease Father     Diabetes Daughter     Mental illness Daughter         disorder     Social History:  Social History     Substance and Sexual Activity   Alcohol Use Not Currently    Comment: once in a while a cocktail with dinner     Social History     Substance and Sexual Activity   Drug Use No     Social History     Tobacco Use   Smoking Status Never    Passive exposure: Past   Smokeless Tobacco Never     Review of Systems   Constitutional:  Negative for chills, fever and unexpected weight change.   HENT:  Negative for hearing loss, nosebleeds and sore throat.    Eyes:  Negative for pain, redness and visual disturbance.   Respiratory:  Negative for cough, shortness of breath and wheezing.    Cardiovascular:  Negative for chest pain, palpitations and leg swelling.   Gastrointestinal:  Negative for abdominal distention, nausea and vomiting.   Endocrine: Negative for polydipsia and polyuria.   Genitourinary:  Negative for dysuria and hematuria.   Musculoskeletal:  Positive for arthralgias (right knee) and gait problem (Ambulate with a cane).   Skin:  Negative for rash and wound.   Neurological:  Negative for dizziness, numbness and headaches.   Psychiatric/Behavioral:  Negative for decreased concentration and suicidal ideas.          Objective:  BP Readings from Last 1 Encounters:   08/29/24  122/80      Wt Readings from Last 1 Encounters:   08/29/24 49.9 kg (110 lb)      BMI:   Estimated body mass index is 21.48 kg/m² as calculated from the following:    Height as of this encounter: 5' (1.524 m).    Weight as of this encounter: 49.9 kg (110 lb).  BSA:   Estimated body surface area is 1.45 meters squared as calculated from the following:    Height as of this encounter: 5' (1.524 m).    Weight as of this encounter: 49.9 kg (110 lb).   Physical Exam  Vitals and nursing note reviewed.   Constitutional:       Appearance: Normal appearance. She is well-developed.   HENT:      Head: Normocephalic and atraumatic.      Right Ear: External ear normal.      Left Ear: External ear normal.   Eyes:      Extraocular Movements: Extraocular movements intact.      Conjunctiva/sclera: Conjunctivae normal.   Pulmonary:      Effort: Pulmonary effort is normal.   Musculoskeletal:      Cervical back: Neck supple.      Right knee: No effusion.   Skin:     General: Skin is warm and dry.   Neurological:      Mental Status: She is alert and oriented to person, place, and time.      Deep Tendon Reflexes: Reflexes are normal and symmetric.   Psychiatric:         Mood and Affect: Mood normal.         Behavior: Behavior normal.       Right Knee Exam     Muscle Strength   The patient has normal right knee strength.    Tenderness   The patient is experiencing tenderness in the medial joint line.    Range of Motion   Extension:  -5   Flexion:  110     Tests   Varus: negative Valgus: negative    Other   Erythema: absent  Scars: absent  Sensation: normal  Pulse: present  Swelling: none  Effusion: no effusion present    Comments:  Patellofemoral joint crepitation with knee motion  Varus alignment            No new imaging    Large joint arthrocentesis: R knee  Universal Protocol:  Consent: Verbal consent obtained.  Risks and benefits: risks, benefits and alternatives were discussed  Consent given by: patient  Patient understanding: patient  states understanding of the procedure being performed  Site marked: the operative site was marked  Supporting Documentation  Indications: pain   Procedure Details  Location: knee - R knee  Preparation: Patient was prepped and draped in the usual sterile fashion  Needle size: 22 G  Ultrasound guidance: no  Approach: anterolateral  Medications administered: 7 mL lidocaine (PF) 1 %; 6 mg betamethasone acetate-betamethasone sodium phosphate 6 (3-3) mg/mL    Patient tolerance: patient tolerated the procedure well with no immediate complications  Dressing:  Sterile dressing applied        Scribe Attestation      I,:  Kenney Jj am acting as a scribe while in the presence of the attending physician.:       I,:  Celia Du MD personally performed the services described in this documentation    as scribed in my presence.:

## 2024-10-11 ENCOUNTER — TELEPHONE (OUTPATIENT)
Dept: NEPHROLOGY | Facility: CLINIC | Age: 84
End: 2024-10-11

## 2024-10-11 NOTE — TELEPHONE ENCOUNTER
Luis for Dalila patients daughter, Appointment was scheduled for Michaela on 1/29/2024 at 11:30 am in [facility]. if unable to make this appointment advised to contact the office to help reschedule for a better time.

## 2024-10-31 ENCOUNTER — TELEPHONE (OUTPATIENT)
Age: 84
End: 2024-10-31

## 2024-10-31 ENCOUNTER — LAB (OUTPATIENT)
Dept: LAB | Facility: CLINIC | Age: 84
End: 2024-10-31
Payer: MEDICARE

## 2024-10-31 DIAGNOSIS — Z13.6 SCREENING FOR CARDIOVASCULAR CONDITION: ICD-10-CM

## 2024-10-31 DIAGNOSIS — R76.8 ELEVATED SERUM IMMUNOGLOBULIN FREE LIGHT CHAIN LEVEL: Primary | ICD-10-CM

## 2024-10-31 DIAGNOSIS — N18.31 STAGE 3A CHRONIC KIDNEY DISEASE (HCC): ICD-10-CM

## 2024-10-31 DIAGNOSIS — R76.8 ELEVATED SERUM IMMUNOGLOBULIN FREE LIGHT CHAIN LEVEL: ICD-10-CM

## 2024-10-31 DIAGNOSIS — I10 PRIMARY HYPERTENSION: ICD-10-CM

## 2024-10-31 DIAGNOSIS — E55.9 VITAMIN D DEFICIENCY: ICD-10-CM

## 2024-10-31 DIAGNOSIS — I50.22 CHRONIC SYSTOLIC CONGESTIVE HEART FAILURE (HCC): ICD-10-CM

## 2024-10-31 DIAGNOSIS — Z95.810 AICD (AUTOMATIC CARDIOVERTER/DEFIBRILLATOR) PRESENT: ICD-10-CM

## 2024-10-31 DIAGNOSIS — I42.9 CARDIOMYOPATHY, UNSPECIFIED TYPE (HCC): ICD-10-CM

## 2024-10-31 DIAGNOSIS — N25.81 SECONDARY HYPERPARATHYROIDISM OF RENAL ORIGIN (HCC): ICD-10-CM

## 2024-10-31 LAB
25(OH)D3 SERPL-MCNC: 27.3 NG/ML (ref 30–100)
ALBUMIN SERPL BCG-MCNC: 4.1 G/DL (ref 3.5–5)
ALP SERPL-CCNC: 44 U/L (ref 34–104)
ALT SERPL W P-5'-P-CCNC: 14 U/L (ref 7–52)
ANION GAP SERPL CALCULATED.3IONS-SCNC: 9 MMOL/L (ref 4–13)
AST SERPL W P-5'-P-CCNC: 22 U/L (ref 13–39)
BASOPHILS # BLD AUTO: 0.12 THOUSANDS/ΜL (ref 0–0.1)
BASOPHILS NFR BLD AUTO: 2 % (ref 0–1)
BILIRUB SERPL-MCNC: 0.64 MG/DL (ref 0.2–1)
BUN SERPL-MCNC: 17 MG/DL (ref 5–25)
CALCIUM SERPL-MCNC: 9.5 MG/DL (ref 8.4–10.2)
CHLORIDE SERPL-SCNC: 98 MMOL/L (ref 96–108)
CHOLEST SERPL-MCNC: 177 MG/DL
CO2 SERPL-SCNC: 30 MMOL/L (ref 21–32)
CREAT SERPL-MCNC: 0.92 MG/DL (ref 0.6–1.3)
CREAT UR-MCNC: 81.8 MG/DL
EOSINOPHIL # BLD AUTO: 0.14 THOUSAND/ΜL (ref 0–0.61)
EOSINOPHIL NFR BLD AUTO: 2 % (ref 0–6)
ERYTHROCYTE [DISTWIDTH] IN BLOOD BY AUTOMATED COUNT: 13.1 % (ref 11.6–15.1)
GFR SERPL CREATININE-BSD FRML MDRD: 57 ML/MIN/1.73SQ M
GLUCOSE P FAST SERPL-MCNC: 96 MG/DL (ref 65–99)
HCT VFR BLD AUTO: 43.8 % (ref 34.8–46.1)
HDLC SERPL-MCNC: 55 MG/DL
HGB BLD-MCNC: 13.5 G/DL (ref 11.5–15.4)
IGA SERPL-MCNC: 200 MG/DL (ref 66–433)
IGG SERPL-MCNC: 1522 MG/DL (ref 635–1741)
IGM SERPL-MCNC: 96 MG/DL (ref 45–281)
IMM GRANULOCYTES # BLD AUTO: 0.02 THOUSAND/UL (ref 0–0.2)
IMM GRANULOCYTES NFR BLD AUTO: 0 % (ref 0–2)
LDLC SERPL CALC-MCNC: 98 MG/DL (ref 0–100)
LYMPHOCYTES # BLD AUTO: 1.9 THOUSANDS/ΜL (ref 0.6–4.47)
LYMPHOCYTES NFR BLD AUTO: 25 % (ref 14–44)
MCH RBC QN AUTO: 31 PG (ref 26.8–34.3)
MCHC RBC AUTO-ENTMCNC: 30.8 G/DL (ref 31.4–37.4)
MCV RBC AUTO: 101 FL (ref 82–98)
MICROALBUMIN UR-MCNC: <7 MG/L
MONOCYTES # BLD AUTO: 0.67 THOUSAND/ΜL (ref 0.17–1.22)
MONOCYTES NFR BLD AUTO: 9 % (ref 4–12)
NEUTROPHILS # BLD AUTO: 4.85 THOUSANDS/ΜL (ref 1.85–7.62)
NEUTS SEG NFR BLD AUTO: 62 % (ref 43–75)
NONHDLC SERPL-MCNC: 122 MG/DL
NRBC BLD AUTO-RTO: 0 /100 WBCS
PHOSPHATE SERPL-MCNC: 3.4 MG/DL (ref 2.3–4.1)
PLATELET # BLD AUTO: 148 THOUSANDS/UL (ref 149–390)
PMV BLD AUTO: 12.7 FL (ref 8.9–12.7)
POTASSIUM SERPL-SCNC: 4.6 MMOL/L (ref 3.5–5.3)
PROT SERPL-MCNC: 7.7 G/DL (ref 6.4–8.4)
PTH-INTACT SERPL-MCNC: 160 PG/ML (ref 12–88)
RBC # BLD AUTO: 4.36 MILLION/UL (ref 3.81–5.12)
SODIUM SERPL-SCNC: 137 MMOL/L (ref 135–147)
TRIGL SERPL-MCNC: 118 MG/DL
TSH SERPL DL<=0.05 MIU/L-ACNC: 2.51 UIU/ML (ref 0.45–4.5)
WBC # BLD AUTO: 7.7 THOUSAND/UL (ref 4.31–10.16)

## 2024-10-31 PROCEDURE — 80053 COMPREHEN METABOLIC PANEL: CPT

## 2024-10-31 PROCEDURE — 83521 IG LIGHT CHAINS FREE EACH: CPT

## 2024-10-31 PROCEDURE — 82784 ASSAY IGA/IGD/IGG/IGM EACH: CPT

## 2024-10-31 PROCEDURE — 82043 UR ALBUMIN QUANTITATIVE: CPT

## 2024-10-31 PROCEDURE — 84443 ASSAY THYROID STIM HORMONE: CPT

## 2024-10-31 PROCEDURE — 82306 VITAMIN D 25 HYDROXY: CPT

## 2024-10-31 PROCEDURE — 82570 ASSAY OF URINE CREATININE: CPT

## 2024-10-31 PROCEDURE — 85025 COMPLETE CBC W/AUTO DIFF WBC: CPT

## 2024-10-31 PROCEDURE — 86334 IMMUNOFIX E-PHORESIS SERUM: CPT

## 2024-10-31 PROCEDURE — 84165 PROTEIN E-PHORESIS SERUM: CPT

## 2024-10-31 PROCEDURE — 83970 ASSAY OF PARATHORMONE: CPT

## 2024-10-31 PROCEDURE — 36415 COLL VENOUS BLD VENIPUNCTURE: CPT

## 2024-10-31 PROCEDURE — 80061 LIPID PANEL: CPT

## 2024-10-31 PROCEDURE — 84100 ASSAY OF PHOSPHORUS: CPT

## 2024-10-31 NOTE — TELEPHONE ENCOUNTER
[facility] lab calling requesting new lab orders be entered secondary to patient's lab expiring. New lab orders entered.

## 2024-11-01 ENCOUNTER — TELEPHONE (OUTPATIENT)
Dept: NEPHROLOGY | Facility: CLINIC | Age: 84
End: 2024-11-01

## 2024-11-01 LAB
ALBUMIN SERPL ELPH-MCNC: 4.13 G/DL (ref 3.2–5.1)
ALBUMIN SERPL ELPH-MCNC: 56.6 % (ref 48–70)
ALPHA1 GLOB SERPL ELPH-MCNC: 0.26 G/DL (ref 0.15–0.47)
ALPHA1 GLOB SERPL ELPH-MCNC: 3.5 % (ref 1.8–7)
ALPHA2 GLOB SERPL ELPH-MCNC: 0.8 G/DL (ref 0.42–1.04)
ALPHA2 GLOB SERPL ELPH-MCNC: 10.9 % (ref 5.9–14.9)
BETA GLOB ABNORMAL SERPL ELPH-MCNC: 0.42 G/DL (ref 0.31–0.57)
BETA1 GLOB SERPL ELPH-MCNC: 5.8 % (ref 4.7–7.7)
BETA2 GLOB SERPL ELPH-MCNC: 4.1 % (ref 3.1–7.9)
BETA2+GAMMA GLOB SERPL ELPH-MCNC: 0.3 G/DL (ref 0.2–0.58)
GAMMA GLOB ABNORMAL SERPL ELPH-MCNC: 1.39 G/DL (ref 0.4–1.66)
GAMMA GLOB SERPL ELPH-MCNC: 19.1 % (ref 6.9–22.3)
IGG/ALB SER: 1.3 {RATIO} (ref 1.1–1.8)
INTERPRETATION UR IFE-IMP: NORMAL
KAPPA LC FREE SER-MCNC: 44.4 MG/L (ref 3.3–19.4)
KAPPA LC FREE/LAMBDA FREE SER: 2.1 {RATIO} (ref 0.26–1.65)
LAMBDA LC FREE SERPL-MCNC: 21.1 MG/L (ref 5.7–26.3)
PROT PATTERN SERPL ELPH-IMP: NORMAL
PROT SERPL-MCNC: 7.3 G/DL (ref 6.4–8.2)

## 2024-11-01 PROCEDURE — 84165 PROTEIN E-PHORESIS SERUM: CPT | Performed by: PATHOLOGY

## 2024-11-01 PROCEDURE — 86334 IMMUNOFIX E-PHORESIS SERUM: CPT | Performed by: PATHOLOGY

## 2024-11-01 NOTE — TELEPHONE ENCOUNTER
Lm for Michaela asking if she is on vitamin D 2000 units daily is so please increase to 4000 units a day. Vitamin d is low and pth level is elevated.   If not on any vitamin d strat taking 200 units a day.    Any questions advised to call the office.

## 2024-11-01 NOTE — RESULT ENCOUNTER NOTE
Please check with patient if she is taking her vitamin D 2000 units daily over-the-counter if not make sure that she is taking it, if she is already taking 2000 and have her increase it to 4000 units a day.  Since her vitamin D level is running low and her PTH level is running high.  Please let me know if any further questions or concerns thank you

## 2024-11-01 NOTE — TELEPHONE ENCOUNTER
----- Message from Monica Elizalde MD sent at 11/1/2024 12:01 PM EDT -----  Please check with patient if she is taking her vitamin D 2000 units daily over-the-counter if not make sure that she is taking it, if she is already taking 2000 and have her increase it to 4000 units a day.  Since her vitamin D level is running low and her PTH level is running high.  Please let me know if any further questions or concerns thank you

## 2024-11-04 NOTE — TELEPHONE ENCOUNTER
Pt returned call to the office in regards to recent lab results review and message below - pt stated she has been taking vitamin D 1000 units Tues and 1000 units on Thursday - I advised pt to increase dose of vitamin D to a total of 4,000 units day - pt agreed and will increase dose - pt stated she will contact office with any additional questions or concerns - she thanked for answering questions in regard to concerns with recent lab results. Nothing further needed at this time

## 2024-11-04 NOTE — RESULT ENCOUNTER NOTE
[facility] blood work results that were ordered by Dr. Phelps are normal.  Advised to call Endocrine for her other results.

## 2024-11-14 ENCOUNTER — TELEPHONE (OUTPATIENT)
Age: 84
End: 2024-11-14

## 2024-11-14 NOTE — TELEPHONE ENCOUNTER
Called and left patient a message regarding missed appt today.  Left [facility] number to call to reschedule appt.

## 2024-11-28 ENCOUNTER — NURSE TRIAGE (OUTPATIENT)
Dept: OTHER | Facility: OTHER | Age: 84
End: 2024-11-28

## 2024-11-28 NOTE — TELEPHONE ENCOUNTER
"Reason for Disposition   Question about upcoming scheduled surgery, procedure or test, no triage required, and triager able to answer question    Answer Assessment - Initial Assessment Questions  1. REASON FOR CALL: \"What is the main reason for your call?\" or \"How can I best help you?\"    Pt calling to verify when appt with Dr Du is. Pt told 12/5 at 11:15.    Protocols used: Information Only Call - No Triage-Adult-    "

## 2024-12-05 ENCOUNTER — OFFICE VISIT (OUTPATIENT)
Dept: OBGYN CLINIC | Facility: CLINIC | Age: 84
End: 2024-12-05
Payer: MEDICARE

## 2024-12-05 VITALS
BODY MASS INDEX: 21.79 KG/M2 | SYSTOLIC BLOOD PRESSURE: 122 MMHG | WEIGHT: 111 LBS | HEIGHT: 60 IN | DIASTOLIC BLOOD PRESSURE: 82 MMHG

## 2024-12-05 DIAGNOSIS — M17.11 PRIMARY OSTEOARTHRITIS OF RIGHT KNEE: Primary | ICD-10-CM

## 2024-12-05 PROCEDURE — 99213 OFFICE O/P EST LOW 20 MIN: CPT | Performed by: ORTHOPAEDIC SURGERY

## 2024-12-05 PROCEDURE — 20610 DRAIN/INJ JOINT/BURSA W/O US: CPT | Performed by: ORTHOPAEDIC SURGERY

## 2024-12-05 RX ORDER — LIDOCAINE HYDROCHLORIDE 10 MG/ML
7 INJECTION, SOLUTION EPIDURAL; INFILTRATION; INTRACAUDAL; PERINEURAL
Status: COMPLETED | OUTPATIENT
Start: 2024-12-05 | End: 2024-12-05

## 2024-12-05 RX ORDER — BETAMETHASONE SODIUM PHOSPHATE AND BETAMETHASONE ACETATE 3; 3 MG/ML; MG/ML
6 INJECTION, SUSPENSION INTRA-ARTICULAR; INTRALESIONAL; INTRAMUSCULAR; SOFT TISSUE
Status: COMPLETED | OUTPATIENT
Start: 2024-12-05 | End: 2024-12-05

## 2024-12-05 RX ADMIN — BETAMETHASONE SODIUM PHOSPHATE AND BETAMETHASONE ACETATE 6 MG: 3; 3 INJECTION, SUSPENSION INTRA-ARTICULAR; INTRALESIONAL; INTRAMUSCULAR; SOFT TISSUE at 11:15

## 2024-12-05 RX ADMIN — LIDOCAINE HYDROCHLORIDE 7 ML: 10 INJECTION, SOLUTION EPIDURAL; INFILTRATION; INTRACAUDAL; PERINEURAL at 11:15

## 2024-12-05 NOTE — PROGRESS NOTES
Assessment:     1. Primary osteoarthritis of right knee        Plan:     Problem List Items Addressed This Visit          Musculoskeletal and Integument    Primary osteoarthritis of right knee - Primary    Findings consistent with advanced right knee osteoarthritis severe medial. Patient was given repeat cortisone injection, tolerated procedure well, cold compress today. Repeat CSI in 3-4 months as needed. Stationary bike, elliptical for low impact exercise. Tylenol and voltaren gel for pain control. This note is created using dictation transcription. It may contain typographical errors, grammatical errors, improperly dictated works, background noise and other errors.          Relevant Medications    lidocaine (PF) (XYLOCAINE-MPF) 1 % injection 7 mL (Completed)    betamethasone acetate-betamethasone sodium phosphate (CELESTONE) injection 6 mg (Completed)    Other Relevant Orders    Large joint arthrocentesis: R knee (Completed)      Subjective:     Patient ID: Michaela Cruz is a 84 y.o. female.  Chief Complaint:  84 y.o. female presents to the office for follow up of right knee osteoarthritis. Patient completed a visco supplementation series on 2/22/24 with minimal noted benefit. Last CSI 8/29/24 which continues to provide relief but has worn off. She continues to c/o anteromedial based right knee pain. Pain is worse with prolonged ambulation and standing. Cold weather is bothering her knee currently. Using knee sleeve. She denies any mechanical symptoms about the knee. She is requesting to repeat a cortisone injection today into her right knee. No numbness or tingling. No fevers or chills.     Allergy:  No Known Allergies  Medications:  all current active meds have been reviewed  Past Medical History:  Past Medical History:   Diagnosis Date    Acute otitis externa of left ear 04/12/2022    Cardiomyopathy (HCC)     Chronic diarrhea of unknown origin     resolved 12/22/2015    Colon polyp     Contact dermatitis      last assessed 04/19/2013    Esophageal reflux     resolved 07/01/2016    Helicobacter pylori gastrointestinal tract infection 10/25/2018    Hyperlipidemia     resolved 12/22/2015    Muscle weakness (generalized)     resolved 12/22/2015    Pacemaker     Post-viral cough syndrome 12/12/2022    Reactive airway disease     resolved 12/22/2015     Past Surgical History:  Past Surgical History:   Procedure Laterality Date    COLONOSCOPY      LEG SURGERY Left     OTHER SURGICAL HISTORY      open treatment of weight bearing distal tibial fracture    UPPER GASTROINTESTINAL ENDOSCOPY       Family History:  Family History   Problem Relation Age of Onset    Heart disease Father     Diabetes Daughter     Mental illness Daughter         disorder     Social History:  Social History     Substance and Sexual Activity   Alcohol Use Not Currently    Comment: once in a while a cocktail with dinner     Social History     Substance and Sexual Activity   Drug Use No     Social History     Tobacco Use   Smoking Status Never    Passive exposure: Past   Smokeless Tobacco Never     Review of Systems   Constitutional:  Negative for chills, fever and unexpected weight change.   HENT:  Negative for hearing loss, nosebleeds and sore throat.    Eyes:  Negative for pain, redness and visual disturbance.   Respiratory:  Negative for cough, shortness of breath and wheezing.    Cardiovascular:  Negative for chest pain, palpitations and leg swelling.   Gastrointestinal:  Negative for abdominal distention, nausea and vomiting.   Endocrine: Negative for polydipsia and polyuria.   Genitourinary:  Negative for dysuria and hematuria.   Musculoskeletal:  Positive for arthralgias (right knee) and gait problem (Ambulate with a cane).   Skin:  Negative for rash and wound.   Neurological:  Negative for dizziness, numbness and headaches.   Psychiatric/Behavioral:  Negative for decreased concentration and suicidal ideas.          Objective:  BP Readings from Last 1  Encounters:   12/05/24 122/82      Wt Readings from Last 1 Encounters:   12/05/24 50.3 kg (111 lb)      BMI:   Estimated body mass index is 21.68 kg/m² as calculated from the following:    Height as of this encounter: 5' (1.524 m).    Weight as of this encounter: 50.3 kg (111 lb).  BSA:   Estimated body surface area is 1.45 meters squared as calculated from the following:    Height as of this encounter: 5' (1.524 m).    Weight as of this encounter: 50.3 kg (111 lb).   Physical Exam  Vitals and nursing note reviewed.   Constitutional:       Appearance: Normal appearance. She is well-developed.   HENT:      Head: Normocephalic and atraumatic.      Right Ear: External ear normal.      Left Ear: External ear normal.   Eyes:      Extraocular Movements: Extraocular movements intact.      Conjunctiva/sclera: Conjunctivae normal.   Pulmonary:      Effort: Pulmonary effort is normal.   Musculoskeletal:      Cervical back: Neck supple.      Right knee: No effusion.      Instability Tests: Medial Fanny test negative and lateral Fanny test negative.   Skin:     General: Skin is warm and dry.   Neurological:      Mental Status: She is alert and oriented to person, place, and time.      Deep Tendon Reflexes: Reflexes are normal and symmetric.   Psychiatric:         Mood and Affect: Mood normal.         Behavior: Behavior normal.       Right Knee Exam     Muscle Strength   The patient has normal right knee strength.    Tenderness   The patient is experiencing tenderness in the medial joint line.    Range of Motion   Extension:  -5   Flexion:  110     Tests   Fanny:  Medial - negative Lateral - negative  Varus: negative Valgus: negative  Patellar apprehension: negative    Other   Erythema: absent  Scars: absent  Sensation: normal  Pulse: present  Swelling: none  Effusion: no effusion present    Comments:  Patellofemoral joint crepitation with knee motion  Varus alignment            No new imaging    Large joint  arthrocentesis: R knee  Universal Protocol:  Consent: Verbal consent obtained.  Risks and benefits: risks, benefits and alternatives were discussed  Consent given by: patient  Patient understanding: patient states understanding of the procedure being performed  Site marked: the operative site was marked  Patient identity confirmed: verbally with patient  Supporting Documentation  Indications: pain   Procedure Details  Location: knee - R knee  Preparation: Patient was prepped and draped in the usual sterile fashion  Needle size: 22 G  Ultrasound guidance: no  Approach: anterolateral  Medications administered: 7 mL lidocaine (PF) 1 %; 6 mg betamethasone acetate-betamethasone sodium phosphate 6 (3-3) mg/mL    Patient tolerance: patient tolerated the procedure well with no immediate complications  Dressing:  Sterile dressing applied        Scribe Attestation      I,:  Kenney Jj am acting as a scribe while in the presence of the attending physician.:       I,:  Celia Du MD personally performed the services described in this documentation    as scribed in my presence.:

## 2024-12-05 NOTE — ASSESSMENT & PLAN NOTE
Findings consistent with advanced right knee osteoarthritis severe medial. Patient was given repeat cortisone injection, tolerated procedure well, cold compress today. Repeat CSI in 3-4 months as needed. Stationary bike, elliptical for low impact exercise. Tylenol and voltaren gel for pain control. This note is created using dictation transcription. It may contain typographical errors, grammatical errors, improperly dictated works, background noise and other errors.

## 2025-01-22 DIAGNOSIS — N18.31 STAGE 3A CHRONIC KIDNEY DISEASE (HCC): Primary | ICD-10-CM

## 2025-01-24 ENCOUNTER — TELEPHONE (OUTPATIENT)
Age: 85
End: 2025-01-24

## 2025-01-24 NOTE — TELEPHONE ENCOUNTER
Cardiology called with pt on the line but then pt hung up. Pt was calling to confirm an appt  and a couple of questions. I called pt back as well but it was as though pt was picking up the call and not answering.

## 2025-01-28 ENCOUNTER — TELEPHONE (OUTPATIENT)
Dept: HEMATOLOGY ONCOLOGY | Facility: CLINIC | Age: 85
End: 2025-01-28

## 2025-01-28 NOTE — TELEPHONE ENCOUNTER
After 3 attempts to contact patient in regards to rescheduling her appt with Nanda ray on 2/17/2025. I sent letter out to patients address on file and rescheduled her appt to next available date and time. 3/17/2025 @ 12:30pm

## 2025-01-28 NOTE — TELEPHONE ENCOUNTER
Called to reschedule patients appt with Roxanne on 2/17. Call was picked up X2 and then hung up. Unable to leave voicemail

## 2025-01-28 NOTE — TELEPHONE ENCOUNTER
Left voicemail for patients son (listed on CC form) to please have vera contact [facility] office to reschedule her appt for 2/17.

## 2025-01-31 ENCOUNTER — TELEPHONE (OUTPATIENT)
Age: 85
End: 2025-01-31

## 2025-01-31 DIAGNOSIS — C44.311 BASAL CELL CARCINOMA OF NOSE: Primary | ICD-10-CM

## 2025-01-31 NOTE — TELEPHONE ENCOUNTER
Pts son called stating mom is having a hard time getting in touch with the office. She needs to talk to Dr Phelps about a new recommendation for a Dermatologist. Please call her back at [phone]

## 2025-01-31 NOTE — TELEPHONE ENCOUNTER
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 Needs a different provider from the one recommended last time

## 2025-01-31 NOTE — TELEPHONE ENCOUNTER
Left message with patients daughter, Tania the name of this Dermatologist. Dr. Barton  [phone] [address] [place] [address] In [place]. They will be in the office 8AM Monday and we will take care of the referral

## 2025-01-31 NOTE — TELEPHONE ENCOUNTER
Pt called back, dermatologist recommended by Dr Phelps years ago in [place] is no longer there. Please advise

## 2025-02-03 ENCOUNTER — TELEPHONE (OUTPATIENT)
Age: 85
End: 2025-02-03

## 2025-02-03 NOTE — TELEPHONE ENCOUNTER
Patient returning phone call, but called wrong office again. Gave patient phone number for Dr. Barton [phone]     Patient verbalized understanding.

## 2025-02-03 NOTE — TELEPHONE ENCOUNTER
Patient called our office in error, she was trying to contact Dr. Barton [phone] [facility] In [place]. Call transferred successfully.

## 2025-02-06 DIAGNOSIS — I10 PRIMARY HYPERTENSION: ICD-10-CM

## 2025-02-06 RX ORDER — LOSARTAN POTASSIUM 100 MG/1
100 TABLET ORAL EVERY 24 HOURS
Qty: 90 TABLET | Refills: 1 | OUTPATIENT
Start: 2025-02-06

## 2025-02-06 RX ORDER — METOPROLOL SUCCINATE 25 MG/1
25 TABLET, EXTENDED RELEASE ORAL DAILY
Qty: 90 TABLET | Refills: 1 | OUTPATIENT
Start: 2025-02-06

## 2025-02-06 NOTE — TELEPHONE ENCOUNTER
Please contact patient when called into pharmacy.      Medication: Losartan    Dose/Frequency: 100mg 1 tab day    Quantity: 90    Pharmacy: [facility]    Office:   [x] PCP/Provider -   [] Speciality/Provider -     Does the patient have enough for 3 days?   [] Yes   [x] No - Send as HP to POD    Medication: Metoprolol     Dose/Frequency: 25mg 1 tab day    Quantity: 90    Pharmacy: [facility]     Office:   [x] PCP/Provider -   [] Speciality/Provider -     Does the patient have enough for 3 days?   [] Yes   [x] No - Send as HP to POD

## 2025-02-12 ENCOUNTER — RA CDI HCC (OUTPATIENT)
Dept: OTHER | Facility: HOSPITAL | Age: 85
End: 2025-02-12

## 2025-02-12 NOTE — PROGRESS NOTES
I13.0  I42.8  HCC coding opportunities          Chart Reviewed number of suggestions sent to Provider: 2     Patients Insurance     Medicare Insurance: Medicare

## 2025-03-05 ENCOUNTER — TELEPHONE (OUTPATIENT)
Dept: NEPHROLOGY | Facility: CLINIC | Age: 85
End: 2025-03-05

## 2025-03-05 NOTE — TELEPHONE ENCOUNTER
Attempted to contact pt to see if she wanted to move her 3/17/25 appt with Sonia in QO to 3/6/25 at 2:30 with Gloria in QO. Unable to leave [facility].

## 2025-03-17 ENCOUNTER — OFFICE VISIT (OUTPATIENT)
Age: 85
End: 2025-03-17
Payer: MEDICARE

## 2025-03-17 VITALS
HEART RATE: 80 BPM | SYSTOLIC BLOOD PRESSURE: 120 MMHG | HEIGHT: 60 IN | OXYGEN SATURATION: 97 % | TEMPERATURE: 97.8 F | WEIGHT: 106.6 LBS | DIASTOLIC BLOOD PRESSURE: 74 MMHG | BODY MASS INDEX: 20.93 KG/M2

## 2025-03-17 DIAGNOSIS — R76.8 ELEVATED SERUM IMMUNOGLOBULIN FREE LIGHT CHAINS: Primary | ICD-10-CM

## 2025-03-17 PROCEDURE — 99213 OFFICE O/P EST LOW 20 MIN: CPT | Performed by: NURSE PRACTITIONER

## 2025-03-17 NOTE — PROGRESS NOTES
Name: Michaela Cruz      : 1940      MRN: [id number]  Encounter Provider: CARA Shepard  Encounter Date: 3/17/2025   Encounter department: [facility]  :  Assessment & Plan  Elevated serum immunoglobulin free light chains  Patient is a pleasant 84-year-old female who was referred to hematology for elevated serum free light chain ratio in the setting of CKD  She has been on observation  Most recent blood work continues to show an elevated serum immunoglobulin free light chain level without any evidence of anemia, worsening renal insufficiency, hypercalcemia or monoclonal gammopathy.     Patient has no evidence of endorgan damage.  No monoclonal gammopathy.  Suspect elevation in serum light chains is secondary to CKD.  Free light chain ratio has actually improved over the years.  I am going to discharge her back to the care of her PCP.  She will follow closely with her other specialists  I will happily see her back in the future should the need arise, should she develop any anemia, worsening renal insufficiency or abnormality on SPEP    Patient and her son were very much in agreement with this plan of care.  They know to call anytime with questions or concerns             Return if symptoms worsen or fail to improve.    History of Present Illness   Chief Complaint   Patient presents with    Follow-up     Pertinent Medical History     25: Patient presents for yearly follow-up along with her son.  Yearly follow-up was due in 10/2024, she had to reschedule multiple appointments for various reasons.  Yearly blood work was completed in 10/2024.  Those labs were reviewed today.  CBC with differential shows no anemia.    CMP normal.  Serum creatinine stable 0.92  Immunoglobulins normal  PTH elevated, vitamin D level low.  She does follow with endocrinology  SPEP with serum immunofixation negative for monoclonal gammopathy  Serum free light  chains show elevated IgG kappa serum free light chain ratio slightly elevated at 2.1    Overall, doing well.  At baseline.       Review of Systems   All other systems reviewed and are negative.    Medical History Reviewed by provider this encounter:     .      Objective   /74 (BP Location: Left arm, Patient Position: Sitting, Cuff Size: Standard)   Pulse 80   Temp 97.8 °F (36.6 °C) (Temporal)   Ht 5' (1.524 m)   Wt 48.4 kg (106 lb 9.6 oz)   LMP  (LMP Unknown)   SpO2 97%   BMI 20.82 kg/m²     Physical Exam  Constitutional:       General: She is not in acute distress.  HENT:      Head: Normocephalic and atraumatic.   Eyes:      General: No scleral icterus.  Pulmonary:      Effort: Pulmonary effort is normal. No respiratory distress.   Musculoskeletal:      Cervical back: Normal range of motion.      Comments: Ambulates with cane   Skin:     General: Skin is warm and dry.   Neurological:      General: No focal deficit present.      Mental Status: She is alert and oriented to person, place, and time.   Psychiatric:         Mood and Affect: Mood normal.         Behavior: Behavior normal.         Labs: I have reviewed the following labs:  Results for orders placed or performed in visit on 10/31/24   PTH, intact   Result Value Ref Range    .0 (H) 12.0 - 88.0 pg/mL   Albumin / creatinine urine ratio   Result Value Ref Range    Creatinine, Ur 81.8 Reference range not established. mg/dL    Albumin,U,Random <7.0 <20.0 mg/L    Albumin Creat Ratio     Result Value Ref Range    Phosphorus 3.4 2.3 - 4.1 mg/dL   Vitamin D 25 hydroxy   Result Value Ref Range    Vit D, 25-Hydroxy 27.3 (L) 30.0 - 100.0 ng/mL   Lipid panel   Result Value Ref Range    Cholesterol 177 See Comment mg/dL    Triglycerides 118 See Comment mg/dL    HDL, Direct 55 >=50 mg/dL    LDL Calculated 98 0 - 100 mg/dL    Non-HDL-Chol (CHOL-HDL) 122 mg/dl   TSH, 3rd generation   Result Value Ref Range    TSH 3RD GENERATON 2.510 0.450 - 4.500 uIU/mL    CBC and differential   Result Value Ref Range    WBC 7.70 4.31 - 10.16 Thousand/uL    RBC 4.36 3.81 - 5.12 Million/uL    Hemoglobin 13.5 11.5 - 15.4 g/dL    Hematocrit 43.8 34.8 - 46.1 %     (H) 82 - 98 fL    MCH 31.0 26.8 - 34.3 pg    MCHC 30.8 (L) 31.4 - 37.4 g/dL    RDW 13.1 11.6 - 15.1 %    MPV 12.7 8.9 - 12.7 fL    Platelets 148 (L) 149 - 390 Thousands/uL    nRBC 0 /100 WBCs    Segmented % 62 43 - 75 %    Immature Grans % 0 0 - 2 %    Lymphocytes % 25 14 - 44 %    Monocytes % 9 4 - 12 %    Eosinophils Relative 2 0 - 6 %    Basophils Relative 2 (H) 0 - 1 %    Absolute Neutrophils 4.85 1.85 - 7.62 Thousands/µL    Absolute Immature Grans 0.02 0.00 - 0.20 Thousand/uL    Absolute Lymphocytes 1.90 0.60 - 4.47 Thousands/µL    Absolute Monocytes 0.67 0.17 - 1.22 Thousand/µL    Eosinophils Absolute 0.14 0.00 - 0.61 Thousand/µL    Basophils Absolute 0.12 (H) 0.00 - 0.10 Thousands/µL   Comprehensive metabolic panel   Result Value Ref Range    Sodium 137 135 - 147 mmol/L    Potassium 4.6 3.5 - 5.3 mmol/L    Chloride 98 96 - 108 mmol/L    CO2 30 21 - 32 mmol/L    ANION GAP 9 4 - 13 mmol/L    BUN 17 5 - 25 mg/dL    Creatinine 0.92 0.60 - 1.30 mg/dL    Glucose, Fasting 96 65 - 99 mg/dL    Calcium 9.5 8.4 - 10.2 mg/dL    AST 22 13 - 39 U/L    ALT 14 7 - 52 U/L    Alkaline Phosphatase 44 34 - 104 U/L    Total Protein 7.7 6.4 - 8.4 g/dL    Albumin 4.1 3.5 - 5.0 g/dL    Total Bilirubin 0.64 0.20 - 1.00 mg/dL    eGFR 57 ml/min/1.73sq m   IgG, IgA, IgM   Result Value Ref Range     66 - 433 mg/dL    IGG 1,522 635 - 1,741 mg/dL    IGM 96 45 - 281 mg/dL   Immunoglobulin free LT chains blood   Result Value Ref Range    Ig Kappa Free Light Chain 44.4 (H) 3.3 - 19.4 mg/L    Ig Lambda Free Light Chain 21.1 5.7 - 26.3 mg/L    Kappa/Lambda FluidC Ratio 2.10 (H) 0.26 - 1.65   Protein electrophoresis, serum   Result Value Ref Range    A/G Ratio 1.30 1.10 - 1.80    Albumin Electrophoresis 56.6 48.0 - 70.0 %    Albumin CONC 4.13  3.20 - 5.10 g/dl    Alpha 1 3.5 1.8 - 7.0 %    ALPHA 1 CONC 0.26 0.15 - 0.47 g/dL    Alpha 2 10.9 5.9 - 14.9 %    ALPHA 2 CONC 0.80 0.42 - 1.04 g/dL    Beta-1 5.8 4.7 - 7.7 %    BETA 1 CONC 0.42 0.31 - 0.57 g/dL    Beta-2 4.1 3.1 - 7.9 %    BETA 2 CONC 0.30 0.20 - 0.58 g/dL    Gamma Globulin 19.1 6.9 - 22.3 %    GAMMA CONC 1.39 0.40 - 1.66 g/dL    Total Protein 7.3 6.4 - 8.2 g/dL    SPEP Interpretation See Comment    Immunofixation, Serum(Reflex Only-Do Not Order)   Result Value Ref Range    Immunofixation Interpretation See Comment            Administrative Statements   I have spent a total time of 25 minutes in caring for this patient on the day of the visit/encounter including Diagnostic results, Instructions for management, Documenting in the medical record, Reviewing/placing orders in the medical record (including tests, medications, and/or procedures), and Obtaining or reviewing history  .

## 2025-03-19 ENCOUNTER — TELEPHONE (OUTPATIENT)
Dept: NEPHROLOGY | Facility: CLINIC | Age: 85
End: 2025-03-19

## 2025-03-19 NOTE — TELEPHONE ENCOUNTER
was a no show 1/29/25 & 3/17/25. Pt of Dr. Elizalde. will need labs.     Called and spoke with Michaela regarding setting another appointment up. She kept saying Dr. Elizalde does not want to see her and declined to schedule.

## 2025-04-08 ENCOUNTER — OFFICE VISIT (OUTPATIENT)
Dept: OBGYN CLINIC | Facility: CLINIC | Age: 85
End: 2025-04-08
Payer: MEDICARE

## 2025-04-08 VITALS — WEIGHT: 108 LBS | BODY MASS INDEX: 21.2 KG/M2 | HEIGHT: 60 IN

## 2025-04-08 DIAGNOSIS — M17.11 PRIMARY OSTEOARTHRITIS OF RIGHT KNEE: Primary | ICD-10-CM

## 2025-04-08 PROCEDURE — 20610 DRAIN/INJ JOINT/BURSA W/O US: CPT | Performed by: ORTHOPAEDIC SURGERY

## 2025-04-08 PROCEDURE — 99213 OFFICE O/P EST LOW 20 MIN: CPT | Performed by: ORTHOPAEDIC SURGERY

## 2025-04-08 RX ORDER — LIDOCAINE HYDROCHLORIDE 10 MG/ML
7 INJECTION, SOLUTION INFILTRATION; PERINEURAL
Status: COMPLETED | OUTPATIENT
Start: 2025-04-08 | End: 2025-04-08

## 2025-04-08 RX ORDER — BETAMETHASONE SODIUM PHOSPHATE AND BETAMETHASONE ACETATE 3; 3 MG/ML; MG/ML
6 INJECTION, SUSPENSION INTRA-ARTICULAR; INTRALESIONAL; INTRAMUSCULAR; SOFT TISSUE
Status: COMPLETED | OUTPATIENT
Start: 2025-04-08 | End: 2025-04-08

## 2025-04-08 RX ADMIN — LIDOCAINE HYDROCHLORIDE 7 ML: 10 INJECTION, SOLUTION INFILTRATION; PERINEURAL at 10:15

## 2025-04-08 RX ADMIN — BETAMETHASONE SODIUM PHOSPHATE AND BETAMETHASONE ACETATE 6 MG: 3; 3 INJECTION, SUSPENSION INTRA-ARTICULAR; INTRALESIONAL; INTRAMUSCULAR; SOFT TISSUE at 10:15

## 2025-04-08 NOTE — PROGRESS NOTES
Assessment:     1. Primary osteoarthritis of right knee        Plan:     Problem List Items Addressed This Visit          Musculoskeletal and Integument    Primary osteoarthritis of right knee - Primary    Findings are consistent with advanced right knee osteoarthritis. Findings and treatment options discussed with patient. No new images at today's visit. Cortisone injection given at today's visit. Patient tolerated the procedure well. Can use cold compress as needed over the next 24 hours. Cortisone injection can be repeated every 3-4 months as needed. Patient should avoid high impact activities such as running, jumping, squatting. Patient should limit walking up and down stairs, hills and uneven surfaces. I recommend using a stationary bike, swimming or walking on even surfaces for exercise. Patient can use topical Voltaren gel or Aspercreme, Tylenol. Follow up as needed. All patient's questions were answered to her satisfaction.  This note is created using dictation transcription.  It may contain typographical errors, grammatical errors, improperly dictated words, background noise and other errors.         Relevant Orders    Large joint arthrocentesis: R knee      Subjective:     Patient ID: Michaela Cruz is a 84 y.o. female.  Chief Complaint:  84 y.o. female presents today for follow up for right knee primary osteoarthritis. She reports having a cortisone injection for the right knee at her last appointment on 12/5/2024. She reports having good relief from the previous cortisone injection. She notes having returning dull achy pain in the knee. She states cold weather and change in weather has aggravated the knee. She denies any trauma or injury to the knee. Patient has tried viscosupplement injection in the past and it has not work as well.    Allergy:  No Known Allergies  Medications:  all current active meds have been reviewed  Past Medical History:  Past Medical History:   Diagnosis Date    Acute otitis  externa of left ear 04/12/2022    Cardiomyopathy (HCC)     Chronic diarrhea of unknown origin     resolved 12/22/2015    Colon polyp     Contact dermatitis     last assessed 04/19/2013    Esophageal reflux     resolved 07/01/2016    Helicobacter pylori gastrointestinal tract infection 10/25/2018    Hyperlipidemia     resolved 12/22/2015    Muscle weakness (generalized)     resolved 12/22/2015    Pacemaker     Post-viral cough syndrome 12/12/2022    Reactive airway disease     resolved 12/22/2015     Past Surgical History:  Past Surgical History:   Procedure Laterality Date    COLONOSCOPY      LEG SURGERY Left     OTHER SURGICAL HISTORY      open treatment of weight bearing distal tibial fracture    UPPER GASTROINTESTINAL ENDOSCOPY       Family History:  Family History   Problem Relation Age of Onset    Heart disease Father     Diabetes Daughter     Mental illness Daughter         disorder     Social History:  Social History     Substance and Sexual Activity   Alcohol Use Not Currently    Comment: once in a while a cocktail with dinner     Social History     Substance and Sexual Activity   Drug Use No     Social History     Tobacco Use   Smoking Status Never    Passive exposure: Past   Smokeless Tobacco Never     Review of Systems   Constitutional:  Negative for chills and fever.   HENT:  Negative for ear pain and sore throat.    Eyes:  Negative for pain and visual disturbance.   Respiratory:  Negative for cough and shortness of breath.    Cardiovascular:  Negative for chest pain and palpitations.   Gastrointestinal:  Negative for abdominal pain and vomiting.   Genitourinary:  Negative for dysuria and hematuria.   Musculoskeletal:  Negative for arthralgias and back pain.   Skin:  Negative for color change and rash.   Neurological:  Negative for seizures and syncope.   All other systems reviewed and are negative.        Objective:  BP Readings from Last 1 Encounters:   03/17/25 120/74      Wt Readings from Last 1  Encounters:   04/08/25 49 kg (108 lb)      BMI:   Estimated body mass index is 21.09 kg/m² as calculated from the following:    Height as of this encounter: 5' (1.524 m).    Weight as of this encounter: 49 kg (108 lb).  BSA:   Estimated body surface area is 1.44 meters squared as calculated from the following:    Height as of this encounter: 5' (1.524 m).    Weight as of this encounter: 49 kg (108 lb).   Physical Exam  Vitals and nursing note reviewed.   Constitutional:       Appearance: Normal appearance. She is well-developed.   HENT:      Head: Normocephalic and atraumatic.      Right Ear: External ear normal.      Left Ear: External ear normal.   Eyes:      General: No scleral icterus.     Extraocular Movements: Extraocular movements intact.      Conjunctiva/sclera: Conjunctivae normal.   Cardiovascular:      Rate and Rhythm: Normal rate.   Pulmonary:      Effort: Pulmonary effort is normal. No respiratory distress.   Musculoskeletal:      Cervical back: Normal range of motion and neck supple.      Right knee: No effusion.      Comments: See Ortho exam   Skin:     General: Skin is warm and dry.   Neurological:      General: No focal deficit present.      Mental Status: She is alert and oriented to person, place, and time.   Psychiatric:         Behavior: Behavior normal.       Right Knee Exam     Tenderness   The patient is experiencing tenderness in the medial joint line.    Range of Motion   The patient has normal right knee ROM.    Tests   Varus: negative Valgus: negative    Other   Erythema: absent  Scars: absent  Sensation: normal  Pulse: present  Swelling: none  Effusion: no effusion present    Comments:  Patellofemoral crepitus with knee motion              Large joint arthrocentesis: R knee  Universal Protocol:  Consent: Verbal consent obtained.  Risks and benefits: risks, benefits and alternatives were discussed  Consent given by: patient  Patient understanding: patient states understanding of the  procedure being performed  Site marked: the operative site was marked  Patient identity confirmed: verbally with patient  Supporting Documentation  Indications: pain   Procedure Details  Location: knee - R knee  Needle size: 22 G  Ultrasound guidance: no  Approach: anterolateral  Medications administered: 7 mL lidocaine 1 %; 6 mg betamethasone acetate-betamethasone sodium phosphate 6 (3-3) mg/mL    Patient tolerance: patient tolerated the procedure well with no immediate complications  Dressing:  Sterile dressing applied           No new images at today's visit.    Scribe Attestation      I,:  Jorge Alberto Yi am acting as a scribe while in the presence of the attending physician.:       I,:  Celia Du MD personally performed the services described in this documentation    as scribed in my presence.:

## 2025-04-08 NOTE — ASSESSMENT & PLAN NOTE
Findings are consistent with advanced right knee osteoarthritis. Findings and treatment options discussed with patient. No new images at today's visit. Cortisone injection given at today's visit. Patient tolerated the procedure well. Can use cold compress as needed over the next 24 hours. Cortisone injection can be repeated every 3-4 months as needed. Patient should avoid high impact activities such as running, jumping, squatting. Patient should limit walking up and down stairs, hills and uneven surfaces. I recommend using a stationary bike, swimming or walking on even surfaces for exercise. Patient can use topical Voltaren gel or Aspercreme, Tylenol. Follow up as needed. All patient's questions were answered to her satisfaction.  This note is created using dictation transcription.  It may contain typographical errors, grammatical errors, improperly dictated words, background noise and other errors.